# Patient Record
Sex: FEMALE | Race: WHITE | NOT HISPANIC OR LATINO | Employment: UNEMPLOYED | URBAN - NONMETROPOLITAN AREA
[De-identification: names, ages, dates, MRNs, and addresses within clinical notes are randomized per-mention and may not be internally consistent; named-entity substitution may affect disease eponyms.]

---

## 2017-03-01 LAB
EXTERNAL ABO GROUPING: NORMAL
EXTERNAL ANTIBODY SCREEN: NEGATIVE
EXTERNAL CHLAMYDIA SCREEN: NORMAL
EXTERNAL GONORRHEA SCREEN: NORMAL
EXTERNAL HEPATITIS B SURFACE ANTIGEN: NEGATIVE
EXTERNAL RH FACTOR: POSITIVE
EXTERNAL RUBELLA QUALITATIVE: NORMAL
EXTERNAL SYPHILIS RPR SCREEN: NORMAL
HIV1 AB SPEC QL IA.RAPID: NEGATIVE

## 2017-09-13 LAB — EXTERNAL GROUP B STREP ANTIGEN: NORMAL

## 2017-09-27 ENCOUNTER — HOSPITAL ENCOUNTER (OUTPATIENT)
Facility: HOSPITAL | Age: 22
Discharge: HOME | End: 2017-09-27
Attending: OBSTETRICS & GYNECOLOGY | Admitting: OBSTETRICS & GYNECOLOGY

## 2017-09-27 ENCOUNTER — APPOINTMENT (OUTPATIENT)
Dept: ULTRASOUND IMAGING | Facility: HOSPITAL | Age: 22
End: 2017-09-27

## 2017-09-27 VITALS
RESPIRATION RATE: 18 BRPM | WEIGHT: 180 LBS | OXYGEN SATURATION: 99 % | DIASTOLIC BLOOD PRESSURE: 75 MMHG | HEIGHT: 61 IN | BODY MASS INDEX: 33.99 KG/M2 | HEART RATE: 87 BPM | TEMPERATURE: 97.5 F | SYSTOLIC BLOOD PRESSURE: 119 MMHG

## 2017-09-27 PROBLEM — O36.8190 DECREASED FETAL MOVEMENT IN PREGNANCY: Status: ACTIVE | Noted: 2017-09-27

## 2017-09-27 LAB
6-ACETYL MORPHINE: NEGATIVE
AMPHET+METHAMPHET UR QL: NEGATIVE
BARBITURATES UR QL SCN: NEGATIVE
BENZODIAZ UR QL SCN: NEGATIVE
BUPRENORPHINE SERPL-MCNC: NEGATIVE NG/ML
CANNABINOIDS SERPL QL: NEGATIVE
COCAINE UR QL: NEGATIVE
METHADONE UR QL SCN: NEGATIVE
OPIATES UR QL: NEGATIVE
OXYCODONE UR QL SCN: NEGATIVE
PCP UR QL SCN: NEGATIVE

## 2017-09-27 PROCEDURE — 80307 DRUG TEST PRSMV CHEM ANLYZR: CPT | Performed by: OBSTETRICS & GYNECOLOGY

## 2017-09-27 PROCEDURE — 76819 FETAL BIOPHYS PROFIL W/O NST: CPT | Performed by: RADIOLOGY

## 2017-09-27 PROCEDURE — 59025 FETAL NON-STRESS TEST: CPT

## 2017-09-27 PROCEDURE — 76819 FETAL BIOPHYS PROFIL W/O NST: CPT

## 2017-09-27 PROCEDURE — G0463 HOSPITAL OUTPT CLINIC VISIT: HCPCS

## 2017-09-27 RX ORDER — LIDOCAINE HYDROCHLORIDE 10 MG/ML
5 INJECTION, SOLUTION INFILTRATION; PERINEURAL AS NEEDED
Status: DISCONTINUED | OUTPATIENT
Start: 2017-09-27 | End: 2017-09-27 | Stop reason: HOSPADM

## 2017-09-27 RX ORDER — SODIUM CHLORIDE 0.9 % (FLUSH) 0.9 %
1-10 SYRINGE (ML) INJECTION AS NEEDED
Status: DISCONTINUED | OUTPATIENT
Start: 2017-09-27 | End: 2017-09-27 | Stop reason: HOSPADM

## 2017-09-27 RX ORDER — PRENATAL VIT NO.126/IRON/FOLIC 28MG-0.8MG
1 TABLET ORAL NIGHTLY
COMMUNITY
End: 2020-06-01

## 2017-10-01 ENCOUNTER — ANESTHESIA (OUTPATIENT)
Dept: LABOR AND DELIVERY | Facility: HOSPITAL | Age: 22
End: 2017-10-01

## 2017-10-01 ENCOUNTER — ANESTHESIA EVENT (OUTPATIENT)
Dept: LABOR AND DELIVERY | Facility: HOSPITAL | Age: 22
End: 2017-10-01

## 2017-10-01 ENCOUNTER — HOSPITAL ENCOUNTER (INPATIENT)
Facility: HOSPITAL | Age: 22
LOS: 3 days | Discharge: HOME | End: 2017-10-04
Attending: OBSTETRICS & GYNECOLOGY | Admitting: OBSTETRICS & GYNECOLOGY

## 2017-10-01 PROBLEM — Z37.9 NORMAL LABOR: Status: ACTIVE | Noted: 2017-10-01

## 2017-10-01 LAB
6-ACETYL MORPHINE: NEGATIVE
ABO GROUP BLD: NORMAL
AMPHET+METHAMPHET UR QL: NEGATIVE
BARBITURATES UR QL SCN: NEGATIVE
BENZODIAZ UR QL SCN: NEGATIVE
BLD GP AB SCN SERPL QL: NEGATIVE
BUPRENORPHINE SERPL-MCNC: NEGATIVE NG/ML
CANNABINOIDS SERPL QL: NEGATIVE
COCAINE UR QL: NEGATIVE
DEPRECATED RDW RBC AUTO: 43 FL (ref 37–54)
ERYTHROCYTE [DISTWIDTH] IN BLOOD BY AUTOMATED COUNT: 14.9 % (ref 11.5–14.5)
HCT VFR BLD AUTO: 29.6 % (ref 37–47)
HGB BLD-MCNC: 9.4 G/DL (ref 12–16)
MCH RBC QN AUTO: 25.5 PG (ref 27–33)
MCHC RBC AUTO-ENTMCNC: 31.8 G/DL (ref 33–37)
MCV RBC AUTO: 80.2 FL (ref 80–94)
METHADONE UR QL SCN: NEGATIVE
OPIATES UR QL: NEGATIVE
OXYCODONE UR QL SCN: NEGATIVE
PCP UR QL SCN: NEGATIVE
PLATELET # BLD AUTO: 247 10*3/MM3 (ref 130–400)
PMV BLD AUTO: 11.7 FL (ref 6–10)
RBC # BLD AUTO: 3.69 10*6/MM3 (ref 4.2–5.4)
RH BLD: POSITIVE
WBC NRBC COR # BLD: 12.62 10*3/MM3 (ref 4.5–12.5)

## 2017-10-01 PROCEDURE — 80307 DRUG TEST PRSMV CHEM ANLYZR: CPT | Performed by: OBSTETRICS & GYNECOLOGY

## 2017-10-01 PROCEDURE — 25010000002 ROPIVACAINE PER 1 MG: Performed by: ANESTHESIOLOGY

## 2017-10-01 PROCEDURE — 36415 COLL VENOUS BLD VENIPUNCTURE: CPT | Performed by: OBSTETRICS & GYNECOLOGY

## 2017-10-01 PROCEDURE — 86850 RBC ANTIBODY SCREEN: CPT | Performed by: OBSTETRICS & GYNECOLOGY

## 2017-10-01 PROCEDURE — 86900 BLOOD TYPING SEROLOGIC ABO: CPT | Performed by: OBSTETRICS & GYNECOLOGY

## 2017-10-01 PROCEDURE — 85027 COMPLETE CBC AUTOMATED: CPT | Performed by: OBSTETRICS & GYNECOLOGY

## 2017-10-01 PROCEDURE — 86901 BLOOD TYPING SEROLOGIC RH(D): CPT | Performed by: OBSTETRICS & GYNECOLOGY

## 2017-10-01 PROCEDURE — C1755 CATHETER, INTRASPINAL: HCPCS | Performed by: ANESTHESIOLOGY

## 2017-10-01 RX ORDER — SODIUM CHLORIDE 0.9 % (FLUSH) 0.9 %
1-10 SYRINGE (ML) INJECTION AS NEEDED
Status: DISCONTINUED | OUTPATIENT
Start: 2017-10-01 | End: 2017-10-02 | Stop reason: HOSPADM

## 2017-10-01 RX ORDER — ONDANSETRON 4 MG/1
4 TABLET, FILM COATED ORAL EVERY 6 HOURS PRN
Status: DISCONTINUED | OUTPATIENT
Start: 2017-10-01 | End: 2017-10-02 | Stop reason: HOSPADM

## 2017-10-01 RX ORDER — ACETAMINOPHEN 325 MG/1
650 TABLET ORAL EVERY 4 HOURS PRN
Status: DISCONTINUED | OUTPATIENT
Start: 2017-10-01 | End: 2017-10-02 | Stop reason: HOSPADM

## 2017-10-01 RX ORDER — MAGNESIUM HYDROXIDE 1200 MG/15ML
1000 LIQUID ORAL ONCE AS NEEDED
Status: DISCONTINUED | OUTPATIENT
Start: 2017-10-01 | End: 2017-10-02 | Stop reason: HOSPADM

## 2017-10-01 RX ORDER — LIDOCAINE HYDROCHLORIDE 20 MG/ML
INJECTION, SOLUTION EPIDURAL; INFILTRATION; INTRACAUDAL; PERINEURAL
Status: DISCONTINUED
Start: 2017-10-01 | End: 2017-10-04 | Stop reason: HOSPADM

## 2017-10-01 RX ORDER — ONDANSETRON 4 MG/1
4 TABLET, ORALLY DISINTEGRATING ORAL EVERY 6 HOURS PRN
Status: DISCONTINUED | OUTPATIENT
Start: 2017-10-01 | End: 2017-10-02 | Stop reason: HOSPADM

## 2017-10-01 RX ORDER — OXYTOCIN/RINGER'S LACTATE 20/1000 ML
2-30 PLASTIC BAG, INJECTION (ML) INTRAVENOUS
Status: DISCONTINUED | OUTPATIENT
Start: 2017-10-01 | End: 2017-10-02 | Stop reason: HOSPADM

## 2017-10-01 RX ORDER — EPHEDRINE SULFATE 50 MG/ML
10 INJECTION, SOLUTION INTRAVENOUS
Status: DISCONTINUED | OUTPATIENT
Start: 2017-10-01 | End: 2017-10-02 | Stop reason: HOSPADM

## 2017-10-01 RX ORDER — FAMOTIDINE 10 MG/ML
20 INJECTION, SOLUTION INTRAVENOUS ONCE AS NEEDED
Status: DISCONTINUED | OUTPATIENT
Start: 2017-10-01 | End: 2017-10-02 | Stop reason: HOSPADM

## 2017-10-01 RX ORDER — LIDOCAINE HYDROCHLORIDE 20 MG/ML
INJECTION, SOLUTION EPIDURAL; INFILTRATION; INTRACAUDAL; PERINEURAL AS NEEDED
Status: DISCONTINUED | OUTPATIENT
Start: 2017-10-01 | End: 2017-10-06 | Stop reason: SURG

## 2017-10-01 RX ORDER — ROPIVACAINE HYDROCHLORIDE 2 MG/ML
INJECTION, SOLUTION EPIDURAL; INFILTRATION; PERINEURAL AS NEEDED
Status: DISCONTINUED | OUTPATIENT
Start: 2017-10-01 | End: 2017-10-06 | Stop reason: SURG

## 2017-10-01 RX ORDER — PSYLLIUM SEED (WITH DEXTROSE)
POWDER (GRAM) ORAL DAILY
Status: ON HOLD | COMMUNITY
End: 2017-10-01

## 2017-10-01 RX ORDER — ONDANSETRON 2 MG/ML
4 INJECTION INTRAMUSCULAR; INTRAVENOUS EVERY 6 HOURS PRN
Status: DISCONTINUED | OUTPATIENT
Start: 2017-10-01 | End: 2017-10-02 | Stop reason: HOSPADM

## 2017-10-01 RX ORDER — PRENATAL VIT/IRON FUM/FOLIC AC 27MG-0.8MG
1 TABLET ORAL DAILY
Status: DISCONTINUED | OUTPATIENT
Start: 2017-10-02 | End: 2017-10-02

## 2017-10-01 RX ORDER — DOCUSATE SODIUM 100 MG/1
100 CAPSULE, LIQUID FILLED ORAL 2 TIMES DAILY PRN
Status: DISCONTINUED | OUTPATIENT
Start: 2017-10-01 | End: 2017-10-02

## 2017-10-01 RX ORDER — ROPIVACAINE HYDROCHLORIDE 2 MG/ML
INJECTION, SOLUTION EPIDURAL; INFILTRATION; PERINEURAL
Status: DISCONTINUED
Start: 2017-10-01 | End: 2017-10-04 | Stop reason: HOSPADM

## 2017-10-01 RX ORDER — BUTORPHANOL TARTRATE 1 MG/ML
1 INJECTION, SOLUTION INTRAMUSCULAR; INTRAVENOUS
Status: DISCONTINUED | OUTPATIENT
Start: 2017-10-01 | End: 2017-10-02 | Stop reason: HOSPADM

## 2017-10-01 RX ORDER — CALCIUM POLYCARBOPHIL 625 MG 625 MG/1
625 TABLET ORAL DAILY
Status: CANCELLED | OUTPATIENT
Start: 2017-10-02

## 2017-10-01 RX ORDER — ONDANSETRON 2 MG/ML
4 INJECTION INTRAMUSCULAR; INTRAVENOUS ONCE AS NEEDED
Status: DISCONTINUED | OUTPATIENT
Start: 2017-10-01 | End: 2017-10-02 | Stop reason: HOSPADM

## 2017-10-01 RX ORDER — SODIUM CHLORIDE, SODIUM LACTATE, POTASSIUM CHLORIDE, CALCIUM CHLORIDE 600; 310; 30; 20 MG/100ML; MG/100ML; MG/100ML; MG/100ML
INJECTION, SOLUTION INTRAVENOUS
Status: COMPLETED
Start: 2017-10-01 | End: 2017-10-01

## 2017-10-01 RX ORDER — DOCUSATE SODIUM 100 MG/1
100 CAPSULE, LIQUID FILLED ORAL 2 TIMES DAILY PRN
Status: ON HOLD | COMMUNITY
End: 2018-10-10

## 2017-10-01 RX ORDER — SODIUM CHLORIDE, SODIUM LACTATE, POTASSIUM CHLORIDE, CALCIUM CHLORIDE 600; 310; 30; 20 MG/100ML; MG/100ML; MG/100ML; MG/100ML
125 INJECTION, SOLUTION INTRAVENOUS CONTINUOUS
Status: DISCONTINUED | OUTPATIENT
Start: 2017-10-01 | End: 2017-10-02

## 2017-10-01 RX ORDER — ROPIVACAINE HYDROCHLORIDE 2 MG/ML
14 INJECTION, SOLUTION EPIDURAL; INFILTRATION; PERINEURAL CONTINUOUS
Status: DISCONTINUED | OUTPATIENT
Start: 2017-10-01 | End: 2017-10-02

## 2017-10-01 RX ADMIN — ROPIVACAINE HYDROCHLORIDE 14 ML/HR: 2 INJECTION, SOLUTION EPIDURAL; INFILTRATION at 21:03

## 2017-10-01 RX ADMIN — ROPIVACAINE HYDROCHLORIDE 6 ML: 2 INJECTION, SOLUTION EPIDURAL; INFILTRATION at 21:02

## 2017-10-01 RX ADMIN — SODIUM CHLORIDE, POTASSIUM CHLORIDE, SODIUM LACTATE AND CALCIUM CHLORIDE 125 ML/HR: 600; 310; 30; 20 INJECTION, SOLUTION INTRAVENOUS at 21:35

## 2017-10-01 RX ADMIN — LIDOCAINE HYDROCHLORIDE 10 ML: 20 INJECTION, SOLUTION EPIDURAL; INFILTRATION; INTRACAUDAL; PERINEURAL at 21:02

## 2017-10-01 RX ADMIN — AMPICILLIN SODIUM 2 G: 2 INJECTION, POWDER, FOR SOLUTION INTRAMUSCULAR; INTRAVENOUS at 20:00

## 2017-10-01 RX ADMIN — AMPICILLIN SODIUM 1 G: 1 INJECTION, POWDER, FOR SOLUTION INTRAMUSCULAR; INTRAVENOUS at 23:44

## 2017-10-01 RX ADMIN — SODIUM CHLORIDE, POTASSIUM CHLORIDE, SODIUM LACTATE AND CALCIUM CHLORIDE 1000 ML: 600; 310; 30; 20 INJECTION, SOLUTION INTRAVENOUS at 19:48

## 2017-10-01 RX ADMIN — Medication 2 G: at 20:00

## 2017-10-02 PROCEDURE — 25010000002 METHYLERGONOVINE MALEATE PER 0.2 MG

## 2017-10-02 PROCEDURE — 0HQ9XZZ REPAIR PERINEUM SKIN, EXTERNAL APPROACH: ICD-10-PCS | Performed by: OBSTETRICS & GYNECOLOGY

## 2017-10-02 PROCEDURE — 25010000002 BUTORPHANOL PER 1 MG: Performed by: OBSTETRICS & GYNECOLOGY

## 2017-10-02 PROCEDURE — 59025 FETAL NON-STRESS TEST: CPT

## 2017-10-02 PROCEDURE — C1755 CATHETER, INTRASPINAL: HCPCS

## 2017-10-02 RX ORDER — ZOLPIDEM TARTRATE 5 MG/1
5 TABLET ORAL NIGHTLY PRN
Status: DISCONTINUED | OUTPATIENT
Start: 2017-10-02 | End: 2017-10-04 | Stop reason: HOSPADM

## 2017-10-02 RX ORDER — HYDROCODONE BITARTRATE AND ACETAMINOPHEN 5; 325 MG/1; MG/1
1 TABLET ORAL EVERY 4 HOURS PRN
Status: DISCONTINUED | OUTPATIENT
Start: 2017-10-02 | End: 2017-10-04 | Stop reason: HOSPADM

## 2017-10-02 RX ORDER — LANOLIN 100 %
OINTMENT (GRAM) TOPICAL
Status: DISCONTINUED | OUTPATIENT
Start: 2017-10-02 | End: 2017-10-04 | Stop reason: HOSPADM

## 2017-10-02 RX ORDER — SODIUM CHLORIDE 0.9 % (FLUSH) 0.9 %
1-10 SYRINGE (ML) INJECTION AS NEEDED
Status: DISCONTINUED | OUTPATIENT
Start: 2017-10-02 | End: 2017-10-04 | Stop reason: HOSPADM

## 2017-10-02 RX ORDER — MISOPROSTOL 100 UG/1
800 TABLET ORAL AS NEEDED
Status: DISCONTINUED | OUTPATIENT
Start: 2017-10-02 | End: 2017-10-02 | Stop reason: HOSPADM

## 2017-10-02 RX ORDER — ACETAMINOPHEN 325 MG/1
650 TABLET ORAL EVERY 4 HOURS PRN
Status: DISCONTINUED | OUTPATIENT
Start: 2017-10-02 | End: 2017-10-04 | Stop reason: HOSPADM

## 2017-10-02 RX ORDER — ONDANSETRON 4 MG/1
4 TABLET, FILM COATED ORAL EVERY 6 HOURS PRN
Status: DISCONTINUED | OUTPATIENT
Start: 2017-10-02 | End: 2017-10-04 | Stop reason: HOSPADM

## 2017-10-02 RX ORDER — OXYTOCIN/RINGER'S LACTATE 20/1000 ML
2 PLASTIC BAG, INJECTION (ML) INTRAVENOUS CONTINUOUS
Status: DISCONTINUED | OUTPATIENT
Start: 2017-10-02 | End: 2017-10-02

## 2017-10-02 RX ORDER — METHYLERGONOVINE MALEATE 0.2 MG/ML
200 INJECTION INTRAVENOUS ONCE AS NEEDED
Status: COMPLETED | OUTPATIENT
Start: 2017-10-02 | End: 2017-10-02

## 2017-10-02 RX ORDER — ONDANSETRON 2 MG/ML
4 INJECTION INTRAMUSCULAR; INTRAVENOUS EVERY 6 HOURS PRN
Status: DISCONTINUED | OUTPATIENT
Start: 2017-10-02 | End: 2017-10-04 | Stop reason: HOSPADM

## 2017-10-02 RX ORDER — IBUPROFEN 800 MG/1
800 TABLET ORAL EVERY 8 HOURS SCHEDULED
Status: DISCONTINUED | OUTPATIENT
Start: 2017-10-02 | End: 2017-10-04 | Stop reason: HOSPADM

## 2017-10-02 RX ORDER — SODIUM CHLORIDE, SODIUM LACTATE, POTASSIUM CHLORIDE, CALCIUM CHLORIDE 600; 310; 30; 20 MG/100ML; MG/100ML; MG/100ML; MG/100ML
125 INJECTION, SOLUTION INTRAVENOUS CONTINUOUS
Status: DISCONTINUED | OUTPATIENT
Start: 2017-10-02 | End: 2017-10-02

## 2017-10-02 RX ORDER — DOCUSATE SODIUM 100 MG/1
100 CAPSULE, LIQUID FILLED ORAL 2 TIMES DAILY
Status: DISCONTINUED | OUTPATIENT
Start: 2017-10-02 | End: 2017-10-03

## 2017-10-02 RX ORDER — CARBOPROST TROMETHAMINE 250 UG/ML
250 INJECTION, SOLUTION INTRAMUSCULAR AS NEEDED
Status: DISCONTINUED | OUTPATIENT
Start: 2017-10-02 | End: 2017-10-02 | Stop reason: HOSPADM

## 2017-10-02 RX ORDER — METHYLERGONOVINE MALEATE 0.2 MG/ML
INJECTION INTRAVENOUS
Status: COMPLETED
Start: 2017-10-02 | End: 2017-10-02

## 2017-10-02 RX ORDER — ONDANSETRON 4 MG/1
4 TABLET, ORALLY DISINTEGRATING ORAL EVERY 6 HOURS PRN
Status: DISCONTINUED | OUTPATIENT
Start: 2017-10-02 | End: 2017-10-04 | Stop reason: HOSPADM

## 2017-10-02 RX ADMIN — WITCH HAZEL 1 PAD: 500 SOLUTION RECTAL; TOPICAL at 06:10

## 2017-10-02 RX ADMIN — DOCUSATE SODIUM 100 MG: 100 CAPSULE, LIQUID FILLED ORAL at 09:21

## 2017-10-02 RX ADMIN — OXYTOCIN 333 MILLI-UNITS/MIN: 10 INJECTION INTRAVENOUS at 02:26

## 2017-10-02 RX ADMIN — IBUPROFEN 800 MG: 800 TABLET ORAL at 13:56

## 2017-10-02 RX ADMIN — IBUPROFEN 800 MG: 800 TABLET ORAL at 06:10

## 2017-10-02 RX ADMIN — DOCUSATE SODIUM 100 MG: 100 CAPSULE, LIQUID FILLED ORAL at 17:17

## 2017-10-02 RX ADMIN — ACETAMINOPHEN 650 MG: 325 TABLET ORAL at 22:31

## 2017-10-02 RX ADMIN — METHYLERGONOVINE MALEATE 200 MCG: 0.2 INJECTION INTRAVENOUS at 02:26

## 2017-10-02 RX ADMIN — METHYLERGONOVINE MALEATE 200 MCG: 0.2 INJECTION, SOLUTION INTRAMUSCULAR; INTRAVENOUS at 02:26

## 2017-10-02 RX ADMIN — BUTORPHANOL TARTRATE 2 MG: 2 INJECTION, SOLUTION INTRAMUSCULAR; INTRAVENOUS at 02:52

## 2017-10-02 RX ADMIN — ACETAMINOPHEN 650 MG: 325 TABLET ORAL at 04:11

## 2017-10-02 RX ADMIN — ACETAMINOPHEN 650 MG: 325 TABLET ORAL at 11:58

## 2017-10-02 RX ADMIN — BENZOCAINE AND MENTHOL: 20; .5 SPRAY TOPICAL at 06:10

## 2017-10-02 RX ADMIN — PRAMOXINE HYDROCHLORIDE AND HYDROCORTISONE ACETATE: 100; 100 AEROSOL, FOAM TOPICAL at 06:10

## 2017-10-02 RX ADMIN — IBUPROFEN 800 MG: 800 TABLET ORAL at 21:26

## 2017-10-02 NOTE — ANESTHESIA PROCEDURE NOTES
Labor Epidural    Patient location during procedure: OB  Start Time: 10/1/2017 9:01 PM  Stop Time: 10/1/2017 9:02 PM  Indication:at surgeon's request  Performed By  Anesthesiologist: RICCO LATIF  Preanesthetic Checklist  Completed: patient identified, site marked, surgical consent, pre-op evaluation, timeout performed, IV checked, risks and benefits discussed and monitors and equipment checked  Prep:  Pt Position:sitting  Sterile Tech:gloves, mask, sterile barrier and cap  Prep:povidone-iodine 7.5% surgical scrub  Monitoring:blood pressure monitoring  Epidural Block Procedure:  Approach:midline  Guidance:landmark technique and palpation technique  Location:L3-L4  Needle Type:Tuohy  Needle Gauge:17 G  Loss of Resistance Medium: saline  Loss of Resistance: 6cm  Cath Depth at skin:13 cm  Paresthesia: none  Aspiration:negative  Test Dose:negative  Number of Attempts: 1  Post Assessment:  Dressing:occlusive dressing applied and secured with tape  Pt Tolerance:patient tolerated the procedure well with no apparent complications  Complications:no

## 2017-10-02 NOTE — PROGRESS NOTES
Case Management/Social Work    Patient Name:  Yvonne Barnett  YOB: 1995  MRN: [id number]  Admit Date:  10/1/2017      SS received consult for positive UDS for THC during pregnancy.  Pt is a 23 y/o who delivered a viable baby girl weighing 7lbs and 3.3 ozs.  Infant named Natalee Valdez.  FOB is Connor Casas and is involved.      SS contacted [facility] and spoke with Katia and she states the pt tested positive on 03/27/17 and 04/26/17 for THC.  Pt reports she used THC due to nausea.  Pt's UDS is negative and infants is negative with meconium pending.  Pt denies any other history of substance abuse.     Pt does not have other children. Pt denies a history with child protective services.   SS contacted [facility] and provided referral.  Intake ID# [id number].  Report was not accepted at this time.  However, SS will review infants UDS and Cord Stat when available.      Pt utilizes [place] Medicaid, WIC and SNAP.  Pt reports she has infant care supplies and car seat available.      Pt will return home with the FOB, Connor at discharge. Pt will be transported home by her father, Diego Barnett.     Pt's UDS is negative and infants is negative with meconium pending. Nursery to contact SS if Cord Stat is positive, infant begins showing signs of withdrawal or other concerns arise.      Infant can be released with the pt at discharge.     SS will continue to follow.          Electronically signed by:  Ina Epperson  10/02/17 12:57 PM

## 2017-10-02 NOTE — PLAN OF CARE
Problem: Patient Care Overview (Adult)  Goal: Plan of Care Review  Outcome: Ongoing (interventions implemented as appropriate)    10/02/17 0543   Coping/Psychosocial Response Interventions   Plan Of Care Reviewed With patient   Patient Care Overview   Progress improving   Outcome Evaluation   Outcome Summary/Follow up Plan will dc home stable and able to care for self and infant       Goal: Adult Individualization and Mutuality  Outcome: Ongoing (interventions implemented as appropriate)  Goal: Discharge Needs Assessment  Outcome: Ongoing (interventions implemented as appropriate)    Problem: Postpartum, Vaginal Delivery (Adult)  Goal: Signs and Symptoms of Listed Potential Problems Will be Absent or Manageable (Postpartum, Vaginal Delivery)  Outcome: Ongoing (interventions implemented as appropriate)

## 2017-10-02 NOTE — L&D DELIVERY NOTE
Vaginal Delivery Procedure Note    Yvonne Barnett  Gestational Age-39.2 wks        OBGYN: Esthela Caicedo DO      Pre-op Diagnosis:   21 y/o  @ 39.2 wks in active labor      Anesthesia: Epidural        Detailed Description of Procedure     The patient was prepped and draped in normal sterile fashion. The head was delivered without difficulty. There was no nuchal cord. Anterior and posterior shoulders delivered without any problems. The rest of the infant was delivered in controlled fashion.The infant was bulb suctioned at delivery. The placenta delivered intact. The patient tolerated the procedure well.       Time of delivery: 222  Maternal Blood Type: A Positive  Fetal Gender: Female  Nuchal Cord: No  Tears: 1st deg midline laceration   Blood Cord: Yes  Estimated Blood Loss: 200cc  Placenta: Spontaneous, Delivered Intact   APGARS:  8  9          Disposition: Transfer to Women's Health Floor  Condition: Stable    Esthela Caicedo DO     Date: 10/2/2017  Time: 2:37 AM

## 2017-10-02 NOTE — PLAN OF CARE
Problem: Labor (Cervical Ripen, Induct, Augment) (Adult,Obstetrics,Pediatric)  Goal: Signs and Symptoms of Listed Potential Problems Will be Absent or Manageable (Labor)  Outcome: Ongoing (interventions implemented as appropriate)    10/01/17 2100   Labor (Cervical Ripen, Induct, Augment)   Problems Assessed (Labor) all   Problems Present (Labor) pain

## 2017-10-02 NOTE — NON STRESS TEST
Yvonne Barnett, a  at 39w2d with an AAKASH of 10/7/2017, by Patient Reported, was seen at [facility] for a nonstress test.    Chief Complaint   Patient presents with   • Abdominal Pain     low abd and back pain, mucousy discharge, states baby has moved well today but slightly less than usual                Reason for test:  labor  Date of Test: 10/1/2017  Time frame of test: 20 mins  RN NST Interpretation:  Reactive NST noted and verified per COLUMBA Porras RN.  Farzaneh Fiore, ALBERTO  10/1/2017 2030

## 2017-10-02 NOTE — PLAN OF CARE
Problem: Labor (Cervical Ripen, Induct, Augment) (Adult,Obstetrics,Pediatric)  Goal: Signs and Symptoms of Listed Potential Problems Will be Absent or Manageable (Labor)  Outcome: Outcome(s) achieved Date Met:  10/02/17    10/02/17 0222   Labor (Cervical Ripen, Induct, Augment)   Problems Assessed (Labor) all   Problems Present (Labor) none

## 2017-10-02 NOTE — H&P
SHERRI Holland  Obstetric History and Physical    Chief Complaint   Patient presents with   • Abdominal Pain     low abd and back pain, mucousy discharge, states baby has moved well today but slightly less than usual       Subjective     Patient is a 22 y.o. female  currently at 39w2d, who presents with labor.    Her prenatal care is benign.  Her previous obstetric/gynecological history is noted for is non-contributory.    The following portions of the patients history were reviewed and updated as appropriate: current medications, allergies, past medical history, past surgical history, past family history, past social history and problem list .   Social History     Social History   • Marital status: Single     Spouse name: N/A   • Number of children: N/A   • Years of education: N/A     Occupational History   • Not on file.     Social History Main Topics   • Smoking status: Former Smoker   • Smokeless tobacco: Never Used   • Alcohol use No   • Drug use: Yes     Special: Marijuana      Comment: PER PRENATAL   • Sexual activity: Defer     Other Topics Concern   • Not on file     Social History Narrative     Past Medical History:   Diagnosis Date   • Migraine     no meds prescribed   • Urinary tract infection        Current Facility-Administered Medications:   •  acetaminophen (TYLENOL) tablet 650 mg, 650 mg, Oral, Q4H PRN, Esthela Caicedo DO  •  [COMPLETED] ampicillin 2 g/100 mL 0.9% NS (MBP), 2 g, Intravenous, Once, 2 g at 10/01/17 2000 **FOLLOWED BY** ampicillin 1 g/100 mL 0.9% NS (MBP), 1 g, Intravenous, Q4H, Esthela Caicedo DO, 1 g at 10/01/17 5214  •  butorphanol (STADOL) injection 1 mg, 1 mg, Intravenous, Q3H PRN, Esthela Caicedo DO  •  butorphanol (STADOL) injection 2 mg, 2 mg, Intravenous, Q3H PRN, Esthela Caicedo DO  •  docusate sodium (COLACE) capsule 100 mg, 100 mg, Oral, BID PRN, Darryn Hernandez MD  •  ePHEDrine injection 10 mg, 10 mg, Intravenous, Q5 Min PRN, Lewis  MD [place]  •  famotidine (PEPCID) injection 20 mg, 20 mg, Intravenous, Once PRN, Lewis Storey MD  •  influenza vac split quad (FLUZONE,FLUARIX,AFLURIA) injection 0.5 mL, 0.5 mL, Intramuscular, During Hospitalization, Esthela Caicedo DO  •  lactated ringers infusion, 125 mL/hr, Intravenous, Continuous, Esthela Caicedo DO, Last Rate: 125 mL/hr at 10/01/17 2135, 125 mL/hr at 10/01/17 2135  •  lidocaine PF 2% (XYLOCAINE) 2 % injection  - ADS Override Pull, , , ,   •  methylergonovine (METHERGINE) 0.2 MG/ML injection  - ADS Override Pull, , , ,   •  ondansetron (ZOFRAN) tablet 4 mg, 4 mg, Oral, Q6H PRN **OR** ondansetron ODT (ZOFRAN-ODT) disintegrating tablet 4 mg, 4 mg, Oral, Q6H PRN **OR** ondansetron (ZOFRAN) injection 4 mg, 4 mg, Intravenous, Q6H PRN, Esthela Caicedo DO  •  ondansetron (ZOFRAN) injection 4 mg, 4 mg, Intravenous, Once PRN, Lewis Storey MD  •  oxytocin (PITOCIN) in lactated Ringer's 1000 mL infusion solution, 2-30 cesar-units/min, Intravenous, Titrated, Esthela Caicedo DO, Stopped at 10/01/17 2348  •  prenatal vitamin 27-0.8 tablet 1 tablet, 1 tablet, Oral, Daily, Darryn Hernandez MD  •  psyllium (KONSYL) pack 1 packet, 1 packet, Oral, Daily, Esthela Caicedo DO  •  ropivacaine (NAROPIN) 0.2 % injection  - ADS Override Pull, , , ,   •  ropivacaine (NAROPIN) 0.2 % injection, 14 mL/hr, Epidural, Continuous, Lewis Storey MD, Last Rate: 14 mL/hr at 10/01/17 2103, 14 mL/hr at 10/01/17 2103  •  sodium chloride (NS) irrigation solution 1,000 mL, 1,000 mL, Irrigation, Once PRN, Esthela Rosita Caicedo, DO  •  sodium chloride 0.9 % flush 1-10 mL, 1-10 mL, Intravenous, PRN, Esthela Caicedo, DO    Facility-Administered Medications Ordered in Other Encounters:   •  lidocaine PF 2% (XYLOCAINE) injection, , Epidural, PRN, Lewis Storey MD, 10 mL at 10/01/17 2102  •  ropivacaine (NAROPIN) 0.2 % injection, , Epidural, PRN, Lewis Storey MD, 6 mL at 10/01/17  2102  No Known Allergies  Past Surgical History:   Procedure Laterality Date   • WISDOM TOOTH EXTRACTION      one was removed         Prenatal Information:   Maternal Prenatal Labs  Blood Type ABO Type   Date Value Ref Range Status   10/01/2017 A  Final      Rh Status RH type   Date Value Ref Range Status   10/01/2017 Positive  Final      Antibody Screen Antibody Screen   Date Value Ref Range Status   10/01/2017 Negative  Final      Rapid Urin Drug Screen Barbiturates Screen, Urine   Date Value Ref Range Status   10/01/2017 Negative Negative Final     Benzodiazepine Screen, Urine   Date Value Ref Range Status   10/01/2017 Negative Negative Final     Methadone Screen, Urine   Date Value Ref Range Status   10/01/2017 Negative Negative Final     Opiate Screen   Date Value Ref Range Status   10/01/2017 Negative Negative Final     THC, Screen, Urine   Date Value Ref Range Status   10/01/2017 Negative Negative Final     Cocaine Screen, Urine   Date Value Ref Range Status   10/01/2017 Negative Negative Final     Amphetamine Screen, Urine   Date Value Ref Range Status   10/01/2017 Negative Negative Final     Buprenorphine, Screen, Urine   Date Value Ref Range Status   10/01/2017 Negative Negative Final     Oxycodone Screen, Urine   Date Value Ref Range Status   10/01/2017 Negative Negative Final      Group B Strep Culture No results found for: GBSANTIGEN           External Prenatal Results         Pregnancy Outside Results - these were transcribed from office records.  See scanned records for details. Date Time   Hgb      Hct      ABO ^ A  03/01/17    Rh ^ Positive  03/01/17    Antibody Screen ^ Negative  03/01/17    Glucose Fasting GTT      Glucose Tolerance Test 1 hour      Glucose Tolerance Test 3 hour      Gonorrhea (discrete) ^ NEG  03/01/17    Chlamydia (discrete) ^ NEG  03/01/17    RPR ^ Non-Reactive  03/01/17    VDRL      Syphillis Antibody      Rubella ^ Immune  03/01/17    HBsAg ^ Negative  03/01/17    Herpes  Simplex Virus PCR      Herpes Simplex VIrus Culture      HIV ^ Negative  (A) 17    Hep C RNA Quant PCR      Hep C Antibody      Urine Drug Screen      AFP      Group B Strep ^ POS  17    GBS Susceptibility to Clindamycin      GBS Susceptibility to Eythromycin      Fetal Fibronectin      Genetic Testing, Maternal Blood             Legend: ^: Historical            Past OB History:     Obstetric History       T0      L0     SAB0   TAB0   Ectopic0   Multiple0   Live Births0       # Outcome Date GA Lbr Victor M/2nd Weight Sex Delivery Anes PTL Lv   2 Current            1 SAB                   Past Medical History: Past Medical History:   Diagnosis Date   • Migraine     no meds prescribed   • Urinary tract infection       Past Surgical History Past Surgical History:   Procedure Laterality Date   • WISDOM TOOTH EXTRACTION      one was removed      Family History: Family History   Problem Relation Age of Onset   • Diabetes Paternal Grandfather    • Hypertension Paternal Grandfather    • Hypertension Paternal Grandmother    • Hypertension Maternal Grandmother    • Hypertension Maternal Grandfather       Social History:  reports that she has quit smoking. She has never used smokeless tobacco.   reports that she does not drink alcohol.   reports that she uses illicit drugs, including Marijuana.        Review of Systems      Objective     Vital Signs Range for the last 24 hours  Temperature: Temp:  [96.8 °F (36 °C)-98.1 °F (36.7 °C)] 98.1 °F (36.7 °C)   Temp Source: Temp src: Oral   BP: BP: (119-150)/(66-96) 138/82   Pulse: Heart Rate:  [] 100   Respirations: Resp:  [20] 20   Weight: Weight:  [82.1 kg (181 lb)] 82.1 kg (181 lb)     Physical Examination: General appearance - alert, well appearing, and in no distress, oriented to person, place, and time, normal appearing weight and well hydrated  Mental status - alert, oriented to person, place, and time, normal mood, behavior, speech, dress, motor  activity, and thought processes, affect appropriate to mood  Neck - supple, no significant adenopathy  Chest - clear to auscultation, no wheezes, rales or rhonchi, symmetric air entry, no tachypnea, retractions or cyanosis  Heart - normal rate, regular rhythm, normal S1, S2, no murmurs, rubs, clicks or gallops  Abdomen - soft, nontender, gravid uterus, no masses or organomegaly  no rebound tenderness noted,   Pelvic - normal external genitalia, vulva, vagina, cervix, uterus and adnexa  Neurological - alert, oriented, normal speech, no focal findings or movement disorder noted  Musculoskeletal - no joint tenderness, deformity or swelling  Extremities - peripheral pulses normal, no pedal edema, no clubbing or cyanosis  Skin - normal coloration and turgor, no rashes, no suspicious skin lesions noted        Cervix: Exam by: Method: sterile exam per physician   Dilation: Dilation: 10   Effacement: Cervical Effacement: 100%   Station: Station: 1     Laboratory Results:   Lab Results (last 24 hours)     Procedure Component Value Units Date/Time    CBC (No Diff) [[id number]]  (Abnormal) Collected:  10/01/17 1955    Specimen:  Blood Updated:  10/01/17 2010     WBC 12.62 (H) 10*3/mm3      RBC 3.69 (L) 10*6/mm3      Hemoglobin 9.4 (L) g/dL      Hematocrit 29.6 (L) %      MCV 80.2 fL      MCH 25.5 (L) pg      MCHC 31.8 (L) g/dL      RDW 14.9 (H) %      RDW-SD 43.0 fl      MPV 11.7 (H) fL      Platelets 247 10*3/mm3     Urine Drug Screen - Urine, Clean Catch [[id number]]  (Normal) Collected:  10/01/17 2039    Specimen:  Urine from Urine, Clean Catch Updated:  10/01/17 2117     Amphetamine Screen, Urine Negative     Barbiturates Screen, Urine Negative     Benzodiazepine Screen, Urine Negative     Cocaine Screen, Urine Negative     Methadone Screen, Urine Negative     Opiate Screen Negative     Phencyclidine (PCP), Urine Negative     THC, Screen, Urine Negative     6-ACETYL MORPHINE Negative     Buprenorphine, Screen, Urine Negative      Oxycodone Screen, Urine Negative    Narrative:       Negative Thresholds For Drugs Screened:                  Amphetamines              1000 ng/ml               Barbiturates               200 ng/ml               Benzodiazepines            200 ng/ml              Cocaine                    300 ng/ml              Methadone                  300 ng/ml              Opiates                    300 ng/ml               Phencyclidine               25 ng/ml               THC                         50 ng/ml              6-Acetyl Morphine           10 ng/ml              Buprenorphine                5 ng/ml              Oxycodone                  300 ng/ml    The reference range for all drugs tested is negative. This report includes final unconfirmed qualitative results to be used for medical treatment purposes only. Unconfirmed results must not be used for non-medical purposes such as employment or legal testing. Clinical consideration should be applied to any drug of abuse test, especially when unconfirmed quantitative results are used.          Radiology Review:   Imaging Results (last 72 hours)     ** No results found for the last 72 hours. **            Assessment/Plan     Active Problems:    Normal labor      Assessment & Plan    Assessment:  1.  Intrauterine pregnancy at 39w2d weeks gestation with reassuring fetal status.    2.  labor  without ROM  3.  Obstetrical history significant for is non-contributory.  4.  GBS status: No results found for: GBSANTIGEN    Plan:  1. fetal and uterine monitoring  continuously and expectant management  2. Plan of care has been reviewed with patient   3.  Risks, benefits of treatment plan have been discussed.  4.  All questions have been answered.        Esthela Caicedo DO  10/2/2017  2:36 AM

## 2017-10-02 NOTE — PLAN OF CARE
Problem: Postpartum, Vaginal Delivery (Adult)  Intervention: Support Life/Role Transition    10/02/17 1114   Coping/Psychosocial Interventions   Parent/Child Attachment Promotion attachment promoted   Environmental Support calm environment promoted   Coping Strategies   Trust Relationship/Rapport care explained   Family/Support System Care self-care encouraged         Goal: Signs and Symptoms of Listed Potential Problems Will be Absent or Manageable (Postpartum, Vaginal Delivery)  Outcome: Ongoing (interventions implemented as appropriate)    10/02/17 1114   Postpartum, Vaginal Delivery   Problems Assessed (Postpartum Vaginal Delivery) all   Problems Present (Postpartum Vaginal Delivery) none

## 2017-10-02 NOTE — PROGRESS NOTES
" [facility]  Vaginal Delivery Progress Note    Subjective   Subjective  Postpartum Day 0: Vaginal Delivery    The patient feels tired.  Her pain is well controlled with nonsteroidal anti-inflammatory drugs and opioid analgesics.   She is ambulating well.  Patient describes her bleeding as moderate lochia.    Breastfeeding: declines.    Objective     Objective   Vital Signs Range for the last 24 hours  Temperature: Temp:  [96.8 °F (36 °C)-98.6 °F (37 °C)] 98.5 °F (36.9 °C)   Temp Source: Temp src: Oral   BP: BP: (108-150)/(58-96) 115/75   Pulse: Heart Rate:  [] 66   Respirations: Resp:  [18-20] 20   Weight: Weight:  [181 lb (82.1 kg)] 181 lb (82.1 kg)     Admit Height:  Height: 61\" (154.9 cm)    Physical Exam:  General:  no acute distresss.  Abdomen: Fundus: appropriate, firm, non tender  Extremities: normal, atraumatic, no cyanosis, and trace edema.       [unfilled]       Lab Results   Component Value Date    ABO A 10/01/2017    RH Positive 10/01/2017        Lab Results   Component Value Date    HGB 9.4 (L) 10/01/2017    HCT 29.6 (L) 10/01/2017         Assessment/Plan   Assessment & Plan  Active Problems:    Normal labor      Yvonne Barnett is Day 0  post-partum  Vaginal, Spontaneous Delivery  terminal meconium .      Plan:  Continue current care.      CRIS Cordero  10/2/2017  8:39 AM    "

## 2017-10-02 NOTE — PLAN OF CARE
Problem: Patient Care Overview (Adult)  Goal: Plan of Care Review  Outcome: Ongoing (interventions implemented as appropriate)    10/01/17 2030   Coping/Psychosocial Response Interventions   Plan Of Care Reviewed With patient;significant other   Patient Care Overview   Progress progress toward functional goals as expected       Goal: Adult Individualization and Mutuality  Outcome: Ongoing (interventions implemented as appropriate)    10/01/17 2030   Individualization   Patient Specific Preferences Epidural, bottlefeeding, and HARLAN   Patient Specific Goals Delivery of healthy baby   Patient Specific Interventions EFM, IV antibiotics for GBS+   Mutuality/Individual Preferences   What Anxieties, Fears or Concerns Do You Have About Your Health or Care? general anxiety about having baby, labor and pushing   What Information Would Help Us Give You More Personalized Care? Pt's mom not here, FOB's mother states pt's mother not a part of her life       Goal: Discharge Needs Assessment  Outcome: Ongoing (interventions implemented as appropriate)    10/01/17 2100   Discharge Needs Assessment   Concerns To Be Addressed denies needs/concerns at this time   Readmission Within The Last 30 Days no previous admission in last 30 days   Equipment Needed After Discharge none   Discharge Disposition home or self-care   Discharge Planning Comments expects discharged when able to care for herself and infant   Current Health   Anticipated Changes Related to Illness none   Self-Care   Equipment Currently Used at Home none   Living Environment   Transportation Available car;family or friend will provide

## 2017-10-03 LAB
BASOPHILS # BLD AUTO: 0.02 10*3/MM3 (ref 0–0.3)
BASOPHILS NFR BLD AUTO: 0.2 % (ref 0–2)
DEPRECATED RDW RBC AUTO: 43.5 FL (ref 37–54)
EOSINOPHIL # BLD AUTO: 0.05 10*3/MM3 (ref 0–0.7)
EOSINOPHIL NFR BLD AUTO: 0.4 % (ref 0–5)
ERYTHROCYTE [DISTWIDTH] IN BLOOD BY AUTOMATED COUNT: 15.3 % (ref 11.5–14.5)
HCT VFR BLD AUTO: 26.4 % (ref 37–47)
HGB BLD-MCNC: 8 G/DL (ref 12–16)
IMM GRANULOCYTES # BLD: 0.04 10*3/MM3 (ref 0–0.03)
IMM GRANULOCYTES NFR BLD: 0.3 % (ref 0–0.5)
LYMPHOCYTES # BLD AUTO: 2.95 10*3/MM3 (ref 1–3)
LYMPHOCYTES NFR BLD AUTO: 22.4 % (ref 21–51)
MCH RBC QN AUTO: 24.8 PG (ref 27–33)
MCHC RBC AUTO-ENTMCNC: 30.3 G/DL (ref 33–37)
MCV RBC AUTO: 82 FL (ref 80–94)
MONOCYTES # BLD AUTO: 0.87 10*3/MM3 (ref 0.1–0.9)
MONOCYTES NFR BLD AUTO: 6.6 % (ref 0–10)
NEUTROPHILS # BLD AUTO: 9.23 10*3/MM3 (ref 1.4–6.5)
NEUTROPHILS NFR BLD AUTO: 70.1 % (ref 30–70)
PLATELET # BLD AUTO: 216 10*3/MM3 (ref 130–400)
PMV BLD AUTO: 11.5 FL (ref 6–10)
RBC # BLD AUTO: 3.22 10*6/MM3 (ref 4.2–5.4)
WBC NRBC COR # BLD: 13.16 10*3/MM3 (ref 4.5–12.5)

## 2017-10-03 PROCEDURE — 63710000001 DIPHENHYDRAMINE PER 50 MG: Performed by: OBSTETRICS & GYNECOLOGY

## 2017-10-03 PROCEDURE — 85025 COMPLETE CBC W/AUTO DIFF WBC: CPT | Performed by: OBSTETRICS & GYNECOLOGY

## 2017-10-03 RX ORDER — FERROUS SULFATE 325(65) MG
325 TABLET ORAL 2 TIMES DAILY WITH MEALS
Status: DISCONTINUED | OUTPATIENT
Start: 2017-10-03 | End: 2017-10-04 | Stop reason: HOSPADM

## 2017-10-03 RX ORDER — DOCUSATE SODIUM 100 MG/1
100 CAPSULE, LIQUID FILLED ORAL DAILY
Status: DISCONTINUED | OUTPATIENT
Start: 2017-10-04 | End: 2017-10-04 | Stop reason: HOSPADM

## 2017-10-03 RX ORDER — DIPHENHYDRAMINE HCL 25 MG
25 CAPSULE ORAL NIGHTLY PRN
Status: DISCONTINUED | OUTPATIENT
Start: 2017-10-03 | End: 2017-10-04 | Stop reason: HOSPADM

## 2017-10-03 RX ADMIN — DIPHENHYDRAMINE HYDROCHLORIDE 25 MG: 25 CAPSULE ORAL at 21:38

## 2017-10-03 RX ADMIN — DOCUSATE SODIUM 100 MG: 100 CAPSULE, LIQUID FILLED ORAL at 08:01

## 2017-10-03 RX ADMIN — PRAMOXINE HYDROCHLORIDE AND HYDROCORTISONE ACETATE: 100; 100 AEROSOL, FOAM TOPICAL at 00:27

## 2017-10-03 RX ADMIN — FERROUS SULFATE TAB 325 MG (65 MG ELEMENTAL FE) 325 MG: 325 (65 FE) TAB at 10:47

## 2017-10-03 RX ADMIN — FERROUS SULFATE TAB 325 MG (65 MG ELEMENTAL FE) 325 MG: 325 (65 FE) TAB at 17:28

## 2017-10-03 RX ADMIN — IBUPROFEN 800 MG: 800 TABLET ORAL at 13:37

## 2017-10-03 RX ADMIN — ACETAMINOPHEN 650 MG: 325 TABLET ORAL at 10:52

## 2017-10-03 RX ADMIN — DIPHENHYDRAMINE HYDROCHLORIDE 25 MG: 25 CAPSULE ORAL at 00:25

## 2017-10-03 RX ADMIN — IBUPROFEN 800 MG: 800 TABLET ORAL at 06:12

## 2017-10-03 RX ADMIN — IBUPROFEN 800 MG: 800 TABLET ORAL at 23:08

## 2017-10-03 RX ADMIN — ACETAMINOPHEN 650 MG: 325 TABLET ORAL at 21:38

## 2017-10-03 RX ADMIN — HYDROCODONE BITARTRATE AND ACETAMINOPHEN 1 TABLET: 5; 325 TABLET ORAL at 23:08

## 2017-10-03 RX ADMIN — ACETAMINOPHEN 650 MG: 325 TABLET ORAL at 17:31

## 2017-10-03 NOTE — PAYOR COMM NOTE
"CONTACT:  CEFERINO ASHLEY RN, BSN  UTILIZATION MANAGEMENT DEPT.  [facility]  [address] [facility], [address]  PHONE:  [phone]  FAX: [phone]    REQUEST FOR INPATIENT DELIVERY AUTHORIZATION.    ICD 10 CODE: O80  DR. LEANA CAICEDO NPI: [id number]  [facility] NPI: [id number]    PT ADMITTED 10/1/17, DELIVERED VAGINALLY 10/2/17, WELL BABY GIRL, REGULAR NURSERY, WEIGHT 3269 GRAMS, APGARS 8/9, GA 39/1, , EDC 10/7/17, BABY'S NAME: GRANT JACOB Lisa Baker (22 y.o. Female)     Date of Birth Social Security Number Address Home Phone MRN    [id number]  [id number] [place] [id number] [phone] [id number]    Mandaeism Marital Status          None Single       Admission Date Admission Type Admitting Provider Attending Provider Department, Room/Bed    10/1/17 Elective Leana Caicedo, Leana Macario DO Psychiatric, W246/    Discharge Date Discharge Disposition Discharge Destination                      Attending Provider: Leana Caicedo DO     Allergies:  No Known Allergies    Isolation:  None   Infection:  None   Code Status:  FULL    Ht:  61\" (154.9 cm)   Wt:  181 lb (82.1 kg)    Admission Cmt:  None   Principal Problem:  None                Active Insurance as of 10/1/2017     Primary Coverage     Payor Plan Insurance Group Employer/Plan Group    ANTH MEDICAID [facility] MEDICAID [id number]     Payor Plan Address Payor Plan Phone Number Effective From Effective To    [address] [address] [phone] 2015     [place] [address]       Subscriber Name Subscriber Birth Date Member ID       LISA BARNETT 1995 [id number]                 Emergency Contacts      (Rel.) Home Phone Work Phone Mobile Phone    Diego Barnett (Other) [phone] -- --               History & Physical      H&P filed by Maggi Burgess MD at 10/2/2017  2:32 PM      Scan on " 10/1/2017 : [facility] 10/01/2017 (below)              Electronically signed by Interface, Scans Incoming at 10/2/2017  2:32 PM      Esthela Caicedo DO at 10/2/2017  2:36 AM          SHERRI Holland  Obstetric History and Physical    Chief Complaint   Patient presents with   • Abdominal Pain     low abd and back pain, mucousy discharge, states baby has moved well today but slightly less than usual       Subjective     Patient is a 22 y.o. female  currently at 39w2d, who presents with labor.    Her prenatal care is benign.  Her previous obstetric/gynecological history is noted for is non-contributory.    The following portions of the patients history were reviewed and updated as appropriate: current medications, allergies, past medical history, past surgical history, past family history, past social history and problem list .   Social History     Social History   • Marital status: Single     Spouse name: N/A   • Number of children: N/A   • Years of education: N/A     Occupational History   • Not on file.     Social History Main Topics   • Smoking status: Former Smoker   • Smokeless tobacco: Never Used   • Alcohol use No   • Drug use: Yes     Special: Marijuana      Comment: PER PRENATAL   • Sexual activity: Defer     Other Topics Concern   • Not on file     Social History Narrative     Past Medical History:   Diagnosis Date   • Migraine     no meds prescribed   • Urinary tract infection        Current Facility-Administered Medications:   •  acetaminophen (TYLENOL) tablet 650 mg, 650 mg, Oral, Q4H PRN, Esthela Caicedo DO  •  [COMPLETED] ampicillin 2 g/100 mL 0.9% NS (MBP), 2 g, Intravenous, Once, 2 g at 10/01/17 2000 **FOLLOWED BY** ampicillin 1 g/100 mL 0.9% NS (MBP), 1 g, Intravenous, Q4H, Esthela Caicedo DO, 1 g at 10/01/17 7858  •  butorphanol (STADOL) injection 1 mg, 1 mg, Intravenous, Q3H PRN, Esthela Caicedo DO  •  butorphanol (STADOL) injection 2 mg, 2 mg, Intravenous,  Q3H PRN, Esthela Caicedo DO  •  docusate sodium (COLACE) capsule 100 mg, 100 mg, Oral, BID PRN, Darryn Hernandez MD  •  ePHEDrine injection 10 mg, 10 mg, Intravenous, Q5 Min PRN, Lewis Storey MD  •  famotidine (PEPCID) injection 20 mg, 20 mg, Intravenous, Once PRN, Lewis Storey MD  •  influenza vac split quad (FLUZONE,FLUARIX,AFLURIA) injection 0.5 mL, 0.5 mL, Intramuscular, During Hospitalization, Esthela Caicedo DO  •  lactated ringers infusion, 125 mL/hr, Intravenous, Continuous, Esthela Caicedo DO, Last Rate: 125 mL/hr at 10/01/17 2135, 125 mL/hr at 10/01/17 2135  •  lidocaine PF 2% (XYLOCAINE) 2 % injection  - ADS Override Pull, , , ,   •  methylergonovine (METHERGINE) 0.2 MG/ML injection  - ADS Override Pull, , , ,   •  ondansetron (ZOFRAN) tablet 4 mg, 4 mg, Oral, Q6H PRN **OR** ondansetron ODT (ZOFRAN-ODT) disintegrating tablet 4 mg, 4 mg, Oral, Q6H PRN **OR** ondansetron (ZOFRAN) injection 4 mg, 4 mg, Intravenous, Q6H PRN, Esthela Caicedo DO  •  ondansetron (ZOFRAN) injection 4 mg, 4 mg, Intravenous, Once PRN, Lewis Storey MD  •  oxytocin (PITOCIN) in lactated Ringer's 1000 mL infusion solution, 2-30 cesar-units/min, Intravenous, Titrated, Esthela Caicedo DO, Stopped at 10/01/17 2348  •  prenatal vitamin 27-0.8 tablet 1 tablet, 1 tablet, Oral, Daily, Darryn Hernandez MD  •  psyllium (KONSYL) pack 1 packet, 1 packet, Oral, Daily, Esthela Caicedo DO  •  ropivacaine (NAROPIN) 0.2 % injection  - ADS Override Pull, , , ,   •  ropivacaine (NAROPIN) 0.2 % injection, 14 mL/hr, Epidural, Continuous, Lewis Storey MD, Last Rate: 14 mL/hr at 10/01/17 2103, 14 mL/hr at 10/01/17 2103  •  sodium chloride (NS) irrigation solution 1,000 mL, 1,000 mL, Irrigation, Once PRN, Esthela Caicedo, DO  •  sodium chloride 0.9 % flush 1-10 mL, 1-10 mL, Intravenous, PRN, Esthela Caicedo, DO    Facility-Administered Medications Ordered in Other Encounters:    •  lidocaine PF 2% (XYLOCAINE) injection, , Epidural, PRN, Lewis Storey MD, 10 mL at 10/01/17 2102  •  ropivacaine (NAROPIN) 0.2 % injection, , Epidural, PRN, Lewis Storey MD, 6 mL at 10/01/17 2102  No Known Allergies  Past Surgical History:   Procedure Laterality Date   • WISDOM TOOTH EXTRACTION      one was removed         Prenatal Information:   Maternal Prenatal Labs  Blood Type ABO Type   Date Value Ref Range Status   10/01/2017 A  Final      Rh Status RH type   Date Value Ref Range Status   10/01/2017 Positive  Final      Antibody Screen Antibody Screen   Date Value Ref Range Status   10/01/2017 Negative  Final      Rapid Urin Drug Screen Barbiturates Screen, Urine   Date Value Ref Range Status   10/01/2017 Negative Negative Final     Benzodiazepine Screen, Urine   Date Value Ref Range Status   10/01/2017 Negative Negative Final     Methadone Screen, Urine   Date Value Ref Range Status   10/01/2017 Negative Negative Final     Opiate Screen   Date Value Ref Range Status   10/01/2017 Negative Negative Final     THC, Screen, Urine   Date Value Ref Range Status   10/01/2017 Negative Negative Final     Cocaine Screen, Urine   Date Value Ref Range Status   10/01/2017 Negative Negative Final     Amphetamine Screen, Urine   Date Value Ref Range Status   10/01/2017 Negative Negative Final     Buprenorphine, Screen, Urine   Date Value Ref Range Status   10/01/2017 Negative Negative Final     Oxycodone Screen, Urine   Date Value Ref Range Status   10/01/2017 Negative Negative Final      Group B Strep Culture No results found for: GBSANTIGEN           External Prenatal Results         Pregnancy Outside Results - these were transcribed from office records.  See scanned records for details. Date Time   Hgb      Hct      ABO ^ A  03/01/17    Rh ^ Positive  03/01/17    Antibody Screen ^ Negative  03/01/17    Glucose Fasting GTT      Glucose Tolerance Test 1 hour      Glucose Tolerance Test 3 hour      Gonorrhea  (discrete) ^ NEG  17    Chlamydia (discrete) ^ NEG  17    RPR ^ Non-Reactive  17    VDRL      Syphillis Antibody      Rubella ^ Immune  17    HBsAg ^ Negative  17    Herpes Simplex Virus PCR      Herpes Simplex VIrus Culture      HIV ^ Negative  (A) 17    Hep C RNA Quant PCR      Hep C Antibody      Urine Drug Screen      AFP      Group B Strep ^ POS  17    GBS Susceptibility to Clindamycin      GBS Susceptibility to Eythromycin      Fetal Fibronectin      Genetic Testing, Maternal Blood             Legend: ^: Historical            Past OB History:     Obstetric History       T0      L0     SAB0   TAB0   Ectopic0   Multiple0   Live Births0       # Outcome Date GA Lbr Victor M/2nd Weight Sex Delivery Anes PTL Lv   2 Current            1 SAB                   Past Medical History: Past Medical History:   Diagnosis Date   • Migraine     no meds prescribed   • Urinary tract infection       Past Surgical History Past Surgical History:   Procedure Laterality Date   • WISDOM TOOTH EXTRACTION      one was removed      Family History: Family History   Problem Relation Age of Onset   • Diabetes Paternal Grandfather    • Hypertension Paternal Grandfather    • Hypertension Paternal Grandmother    • Hypertension Maternal Grandmother    • Hypertension Maternal Grandfather       Social History:  reports that she has quit smoking. She has never used smokeless tobacco.   reports that she does not drink alcohol.   reports that she uses illicit drugs, including Marijuana.        Review of Systems      Objective     Vital Signs Range for the last 24 hours  Temperature: Temp:  [96.8 °F (36 °C)-98.1 °F (36.7 °C)] 98.1 °F (36.7 °C)   Temp Source: Temp src: Oral   BP: BP: (119-150)/(66-96) 138/82   Pulse: Heart Rate:  [] 100   Respirations: Resp:  [20] 20   Weight: Weight:  [82.1 kg (181 lb)] 82.1 kg (181 lb)     Physical Examination: General appearance - alert, well appearing, and  in no distress, oriented to person, place, and time, normal appearing weight and well hydrated  Mental status - alert, oriented to person, place, and time, normal mood, behavior, speech, dress, motor activity, and thought processes, affect appropriate to mood  Neck - supple, no significant adenopathy  Chest - clear to auscultation, no wheezes, rales or rhonchi, symmetric air entry, no tachypnea, retractions or cyanosis  Heart - normal rate, regular rhythm, normal S1, S2, no murmurs, rubs, clicks or gallops  Abdomen - soft, nontender, gravid uterus, no masses or organomegaly  no rebound tenderness noted,   Pelvic - normal external genitalia, vulva, vagina, cervix, uterus and adnexa  Neurological - alert, oriented, normal speech, no focal findings or movement disorder noted  Musculoskeletal - no joint tenderness, deformity or swelling  Extremities - peripheral pulses normal, no pedal edema, no clubbing or cyanosis  Skin - normal coloration and turgor, no rashes, no suspicious skin lesions noted        Cervix: Exam by: Method: sterile exam per physician   Dilation: Dilation: 10   Effacement: Cervical Effacement: 100%   Station: Station: 1     Laboratory Results:   Lab Results (last 24 hours)     Procedure Component Value Units Date/Time    CBC (No Diff) [[id number]]  (Abnormal) Collected:  10/01/17 1955    Specimen:  Blood Updated:  10/01/17 2010     WBC 12.62 (H) 10*3/mm3      RBC 3.69 (L) 10*6/mm3      Hemoglobin 9.4 (L) g/dL      Hematocrit 29.6 (L) %      MCV 80.2 fL      MCH 25.5 (L) pg      MCHC 31.8 (L) g/dL      RDW 14.9 (H) %      RDW-SD 43.0 fl      MPV 11.7 (H) fL      Platelets 247 10*3/mm3     Urine Drug Screen - Urine, Clean Catch [[id number]]  (Normal) Collected:  10/01/17 2039    Specimen:  Urine from Urine, Clean Catch Updated:  10/01/17 2117     Amphetamine Screen, Urine Negative     Barbiturates Screen, Urine Negative     Benzodiazepine Screen, Urine Negative     Cocaine Screen, Urine Negative      Methadone Screen, Urine Negative     Opiate Screen Negative     Phencyclidine (PCP), Urine Negative     THC, Screen, Urine Negative     6-ACETYL MORPHINE Negative     Buprenorphine, Screen, Urine Negative     Oxycodone Screen, Urine Negative    Narrative:       Negative Thresholds For Drugs Screened:                  Amphetamines              1000 ng/ml               Barbiturates               200 ng/ml               Benzodiazepines            200 ng/ml              Cocaine                    300 ng/ml              Methadone                  300 ng/ml              Opiates                    300 ng/ml               Phencyclidine               25 ng/ml               THC                         50 ng/ml              6-Acetyl Morphine           10 ng/ml              Buprenorphine                5 ng/ml              Oxycodone                  300 ng/ml    The reference range for all drugs tested is negative. This report includes final unconfirmed qualitative results to be used for medical treatment purposes only. Unconfirmed results must not be used for non-medical purposes such as employment or legal testing. Clinical consideration should be applied to any drug of abuse test, especially when unconfirmed quantitative results are used.          Radiology Review:   Imaging Results (last 72 hours)     ** No results found for the last 72 hours. **            Assessment/Plan     Active Problems:    Normal labor      Assessment & Plan    Assessment:  1.  Intrauterine pregnancy at 39w2d weeks gestation with reassuring fetal status.    2.  labor  without ROM  3.  Obstetrical history significant for is non-contributory.  4.  GBS status: No results found for: GBSANTIGEN    Plan:  1. fetal and uterine monitoring  continuously and expectant management  2. Plan of care has been reviewed with patient   3.  Risks, benefits of treatment plan have been discussed.  4.  All questions have been answered.        Esthela Caicedo,    10/2/2017  2:36 AM       Electronically signed by Esthela Caicedo DO at 10/2/2017  2:37 AM           Operative/Procedure Notes (all)      Esthela Caicedo DO at 10/2/2017  2:37 AM  Version 1 of 1         Vaginal Delivery Procedure Note    Yvonne Barnett  Gestational Age-39.2 wks        OBGYN: Esthela Caicedo DO      Pre-op Diagnosis:   21 y/o  @ 39.2 wks in active labor      Anesthesia: Epidural        Detailed Description of Procedure     The patient was prepped and draped in normal sterile fashion. The head was delivered without difficulty. There was no nuchal cord. Anterior and posterior shoulders delivered without any problems. The rest of the infant was delivered in controlled fashion.The infant was bulb suctioned at delivery. The placenta delivered intact. The patient tolerated the procedure well.       Time of delivery: 222  Maternal Blood Type: A Positive  Fetal Gender: Female  Nuchal Cord: No  Tears: 1st deg midline laceration   Blood Cord: Yes  Estimated Blood Loss: 200cc  Placenta: Spontaneous, Delivered Intact   APGARS:  8  9          Disposition: Transfer to Women's Health Floor  Condition: Stable    Esthela Caicedo DO     Date: 10/2/2017  Time: 2:37 AM       Electronically signed by Esthela Caicedo DO at 10/2/2017  2:38 AM           Physician Progress Notes (all)      CRIS Cordero at 10/2/2017  8:38 AM  Version 1 of 1         [facility]  Vaginal Delivery Progress Note    Subjective   Subjective  Postpartum Day 0: Vaginal Delivery    The patient feels tired.  Her pain is well controlled with nonsteroidal anti-inflammatory drugs and opioid analgesics.   She is ambulating well.  Patient describes her bleeding as moderate lochia.    Breastfeeding: declines.    Objective     Objective   Vital Signs Range for the last 24 hours  Temperature: Temp:  [96.8 °F (36 °C)-98.6 °F (37 °C)] 98.5 °F (36.9 °C)   Temp Source: Temp src: Oral   BP: BP:  "(108-150)/(58-96) 115/75   Pulse: Heart Rate:  [] 66   Respirations: Resp:  [18-20] 20   Weight: Weight:  [181 lb (82.1 kg)] 181 lb (82.1 kg)     Admit Height:  Height: 61\" (154.9 cm)    Physical Exam:  General:  no acute distresss.  Abdomen: Fundus: appropriate, firm, non tender  Extremities: normal, atraumatic, no cyanosis, and trace edema.       [unfilled]       Lab Results   Component Value Date    ABO A 10/01/2017    RH Positive 10/01/2017        Lab Results   Component Value Date    HGB 9.4 (L) 10/01/2017    HCT 29.6 (L) 10/01/2017         Assessment/Plan   Assessment & Plan  Active Problems:    Normal labor      Yvonne Barnett is Day 0  post-partum  Vaginal, Spontaneous Delivery  terminal meconium .      Plan:  Continue current care.      CRIS Cordero  10/2/2017  8:39 AM       Electronically signed by CRIS Cordero at 10/2/2017  8:40 AM      FERNANDO Jewell at 10/3/2017  8:41 AM  Version 1 of [address] [place] [address]  Vaginal Delivery Progress Note    Subjective   Subjective  Postpartum Day 1: Vaginal Delivery    The patient feels tired.  Her pain is well controlled with nonsteroidal anti-inflammatory drugs and opioid analgesics.   She is ambulating well.  Patient describes her bleeding as moderate lochia.    Breastfeeding: declines.    Objective     Objective   Vital Signs Range for the last 24 hours  Temperature: Temp:  [98.9 °F (37.2 °C)] 98.9 °F (37.2 °C)    Temp Source: Temp src: Oral   BP: BP: (102)/(62) 102/62   Pulse: Heart Rate:  [92] 92   Respirations: Resp:  [18] 18   Weight:       Admit Height:  Height: 61\" (154.9 cm)    Physical Exam:  General:  no acute distresss.  Abdomen: Fundus: appropriate, firm, non tender  Extremities: normal, atraumatic, no cyanosis, and trace edema.       [unfilled]       Lab Results   Component Value Date    ABO A 10/01/2017    RH Positive 10/01/2017        Lab Results   Component Value Date    HGB 8.0 (L) 10/03/2017    HCT 26.4 (L) " 10/03/2017         Assessment/Plan   Assessment & Plan  Active Problems:    Normal labor      Yvonne Barnett is Day 1  post-partum  Vaginal, Spontaneous Delivery  terminal meconium .      Plan:  Continue current care.      FERNANDO Jewell  10/3/2017  8:41 AM       Electronically signed by FERNANDO Jewell at 10/3/2017  8:41 AM

## 2017-10-03 NOTE — PLAN OF CARE
Problem: Patient Care Overview (Adult)  Goal: Plan of Care Review  Outcome: Ongoing (interventions implemented as appropriate)    10/03/17 1836   Coping/Psychosocial Response Interventions   Plan Of Care Reviewed With patient   Patient Care Overview   Progress improving   Outcome Evaluation   Outcome Summary/Follow up Plan patient is doing well, tylenol for pain,       10/03/17 1836   Coping/Psychosocial Response Interventions   Plan Of Care Reviewed With patient   Patient Care Overview   Progress improving   Outcome Evaluation   Outcome Summary/Follow up Plan patient is doing well, tylenol for pain, small locia.     small locia.        Goal: Adult Individualization and Mutuality  Outcome: Ongoing (interventions implemented as appropriate)  Goal: Discharge Needs Assessment  Outcome: Ongoing (interventions implemented as appropriate)    Problem: Postpartum, Vaginal Delivery (Adult)  Goal: Signs and Symptoms of Listed Potential Problems Will be Absent or Manageable (Postpartum, Vaginal Delivery)  Outcome: Ongoing (interventions implemented as appropriate)

## 2017-10-03 NOTE — PROGRESS NOTES
" [facility]  Vaginal Delivery Progress Note    Subjective   Subjective  Postpartum Day 1: Vaginal Delivery    The patient feels tired.  Her pain is well controlled with nonsteroidal anti-inflammatory drugs and opioid analgesics.   She is ambulating well.  Patient describes her bleeding as moderate lochia.    Breastfeeding: declines.    Objective     Objective   Vital Signs Range for the last 24 hours  Temperature: Temp:  [98.9 °F (37.2 °C)] 98.9 °F (37.2 °C)   Temp Source: Temp src: Oral   BP: BP: (102)/(62) 102/62   Pulse: Heart Rate:  [92] 92   Respirations: Resp:  [18] 18   Weight:       Admit Height:  Height: 61\" (154.9 cm)    Physical Exam:  General:  no acute distresss.  Abdomen: Fundus: appropriate, firm, non tender  Extremities: normal, atraumatic, no cyanosis, and trace edema.       [unfilled]       Lab Results   Component Value Date    ABO A 10/01/2017    RH Positive 10/01/2017        Lab Results   Component Value Date    HGB 8.0 (L) 10/03/2017    HCT 26.4 (L) 10/03/2017         Assessment/Plan   Assessment & Plan  Active Problems:    Normal labor      Yvonne Barnett is Day 1  post-partum  Vaginal, Spontaneous Delivery  terminal meconium .      Plan:  Continue current care.      Angelia Severino, FERNANDO  10/3/2017  8:41 AM    "

## 2017-10-04 ENCOUNTER — APPOINTMENT (OUTPATIENT)
Dept: LABOR AND DELIVERY | Facility: HOSPITAL | Age: 22
End: 2017-10-04

## 2017-10-04 VITALS
WEIGHT: 181 LBS | SYSTOLIC BLOOD PRESSURE: 101 MMHG | TEMPERATURE: 97.7 F | OXYGEN SATURATION: 98 % | DIASTOLIC BLOOD PRESSURE: 66 MMHG | RESPIRATION RATE: 18 BRPM | HEIGHT: 61 IN | BODY MASS INDEX: 34.17 KG/M2 | HEART RATE: 56 BPM

## 2017-10-04 LAB
BASOPHILS # BLD AUTO: 0.02 10*3/MM3 (ref 0–0.3)
BASOPHILS NFR BLD AUTO: 0.2 % (ref 0–2)
DEPRECATED RDW RBC AUTO: 43.9 FL (ref 37–54)
EOSINOPHIL # BLD AUTO: 0.17 10*3/MM3 (ref 0–0.7)
EOSINOPHIL NFR BLD AUTO: 1.6 % (ref 0–5)
ERYTHROCYTE [DISTWIDTH] IN BLOOD BY AUTOMATED COUNT: 15.4 % (ref 11.5–14.5)
HCT VFR BLD AUTO: 27.7 % (ref 37–47)
HGB BLD-MCNC: 8.4 G/DL (ref 12–16)
IMM GRANULOCYTES # BLD: 0.03 10*3/MM3 (ref 0–0.03)
IMM GRANULOCYTES NFR BLD: 0.3 % (ref 0–0.5)
LYMPHOCYTES # BLD AUTO: 2.89 10*3/MM3 (ref 1–3)
LYMPHOCYTES NFR BLD AUTO: 27.6 % (ref 21–51)
MCH RBC QN AUTO: 24.9 PG (ref 27–33)
MCHC RBC AUTO-ENTMCNC: 30.3 G/DL (ref 33–37)
MCV RBC AUTO: 82 FL (ref 80–94)
MONOCYTES # BLD AUTO: 0.68 10*3/MM3 (ref 0.1–0.9)
MONOCYTES NFR BLD AUTO: 6.5 % (ref 0–10)
NEUTROPHILS # BLD AUTO: 6.68 10*3/MM3 (ref 1.4–6.5)
NEUTROPHILS NFR BLD AUTO: 63.8 % (ref 30–70)
PLATELET # BLD AUTO: 225 10*3/MM3 (ref 130–400)
PMV BLD AUTO: 11.5 FL (ref 6–10)
RBC # BLD AUTO: 3.38 10*6/MM3 (ref 4.2–5.4)
WBC NRBC COR # BLD: 10.47 10*3/MM3 (ref 4.5–12.5)

## 2017-10-04 PROCEDURE — 85025 COMPLETE CBC W/AUTO DIFF WBC: CPT | Performed by: OBSTETRICS & GYNECOLOGY

## 2017-10-04 RX ORDER — IBUPROFEN 600 MG/1
600 TABLET ORAL EVERY 6 HOURS PRN
Qty: 40 TABLET | Refills: 1 | Status: ON HOLD | OUTPATIENT
Start: 2017-10-04 | End: 2018-10-10

## 2017-10-04 RX ORDER — FERROUS SULFATE 325(65) MG
325 TABLET ORAL
Qty: 90 TABLET | Refills: 1 | Status: ON HOLD | OUTPATIENT
Start: 2017-10-04 | End: 2018-10-10

## 2017-10-04 RX ORDER — DOCUSATE SODIUM 100 MG/1
100 CAPSULE, LIQUID FILLED ORAL 2 TIMES DAILY
Qty: 60 CAPSULE | Refills: 0 | Status: SHIPPED | OUTPATIENT
Start: 2017-10-04 | End: 2017-11-03

## 2017-10-04 RX ORDER — IBUPROFEN 600 MG/1
600 TABLET ORAL EVERY 6 HOURS PRN
Qty: 30 TABLET | Refills: 0 | Status: SHIPPED | OUTPATIENT
Start: 2017-10-04 | End: 2017-11-03

## 2017-10-04 RX ADMIN — WITCH HAZEL 1 PAD: 500 SOLUTION RECTAL; TOPICAL at 14:33

## 2017-10-04 RX ADMIN — DOCUSATE SODIUM 100 MG: 100 CAPSULE, LIQUID FILLED ORAL at 09:03

## 2017-10-04 RX ADMIN — HYDROCODONE BITARTRATE AND ACETAMINOPHEN 1 TABLET: 5; 325 TABLET ORAL at 07:42

## 2017-10-04 RX ADMIN — BENZOCAINE AND MENTHOL: 20; .5 SPRAY TOPICAL at 14:33

## 2017-10-04 RX ADMIN — FERROUS SULFATE TAB 325 MG (65 MG ELEMENTAL FE) 325 MG: 325 (65 FE) TAB at 07:42

## 2017-10-04 RX ADMIN — IBUPROFEN 800 MG: 800 TABLET ORAL at 14:48

## 2017-10-04 RX ADMIN — PRAMOXINE HYDROCHLORIDE AND HYDROCORTISONE ACETATE: 100; 100 AEROSOL, FOAM TOPICAL at 14:33

## 2017-10-04 RX ADMIN — ACETAMINOPHEN 650 MG: 325 TABLET ORAL at 06:12

## 2017-10-04 NOTE — DISCHARGE SUMMARY
Discharge Summary    Reason for Hospitalization:  See below    Admission and Discharge Diagnoses:  See below    Course in Hospital:  This patient was admitted to the hospital in active labor.  She underwent vaginal delivery by Dr. Esthela Caicedo on October 2, 2017.  None well since delivery.  Corby made rounds on her yesterday and I dismissed her today.  I'm going to give her ibuprofen for pain.  She knows not to have intercourse.  She will come to the office and see us in 3 weeks for her first postpartum visit.  Her discharge diagnosis is term pregnancy, vaginal delivery.    Discharge Condition:  Stable.    Discharge Instructions and Plans for Follow-Up:  As above    Discharge Prescriptions:  As above

## 2017-10-04 NOTE — PROGRESS NOTES
"SHERRI Holland  Vaginal Delivery Progress Note    Subjective   Subjective  Postpartum Day 2: Vaginal Delivery    The patient feels tired.  Her pain is well controlled with nonsteroidal anti-inflammatory drugs and opioid analgesics.   She is ambulating well.  Patient describes her bleeding as moderate lochia.    Breastfeeding: declines.    Objective     Objective   Vital Signs Range for the last 24 hours  Temperature:     Temp Source:     BP:     Pulse:     Respirations:     Weight:       Admit Height:  Height: 61\" (154.9 cm)    Physical Exam:  General:  no acute distresss.  Abdomen: Fundus: appropriate, firm, non tender  Extremities: normal, atraumatic, no cyanosis, and trace edema.       [unfilled]       Lab Results   Component Value Date    ABO A 10/01/2017    RH Positive 10/01/2017        Lab Results   Component Value Date    HGB 8.0 (L) 10/03/2017    HCT 26.4 (L) 10/03/2017         Assessment/Plan   Assessment & Plan  Active Problems:    Normal labor    Term pregnancy delivered      Yvonne Barnett is Day 2  post-partum  Vaginal, Spontaneous Delivery  terminal meconium .      Plan:  Continue current care.      CRIS Cordero  10/4/2017  6:47 AM    "

## 2017-10-04 NOTE — PLAN OF CARE
Plan of Care Review Outcome(s) achieved      Adult Individualization and Mutuality Outcome(s) achieved      Discharge Needs Assessment Outcome(s) achieved      Signs and Symptoms of Listed Potential Problems Will be Absent or Manageable (Postpartum, Vaginal Delivery) Outcome(s) achieved

## 2017-10-04 NOTE — DISCHARGE SUMMARY
SHERRI Holland  Delivery Discharge Summary    Primary OB Clinician:     EDC: Estimated Date of Delivery: 10/7/17    Gestational Age:39w2d    Antepartum complications: none    Date of Delivery: 10/2/2017   Time of Delivery: 2:22 AM     Delivered By:  Esthela Caicedo     Delivery Type: Vaginal, Spontaneous Delivery      Tubal Ligation: n/a    Baby:Female   Apgar:  8   @ 1 minute /   Apgar:  9   @ 5 minutes   Weight: 7lb 3.3oz     Anesthesia: Epidural      Intrapartum complications: None    Laceration: Yes  Laceration Information  Laceration Repaired?   Perineal: 1st  Yes    Periurethral:         Labial:         Sulcus:         Vaginal: No       Cervical: No              Episiotomy: No    Placenta: Spontaneous     Feeding method: Bottlefeeding    [unfilled]       Lab Results   Component Value Date    ABO A 10/01/2017    RH Positive 10/01/2017        Lab Results   Component Value Date    HGB 8.4 (L) 10/04/2017    HCT 27.7 (L) 10/04/2017       Rh Immune globulin given: no      Discharge Date: 10/4/2017; Discharge Time: 8:54 AM        Plan:    Address and phone number verified and same.  Follow-up appointment with RC in 3 weeks.      CRIS Cordero  10/4/2017  8:54 AM

## 2017-10-05 NOTE — PAYOR COMM NOTE
"CONTACT:  CEFERINO ASHLEY RN, BSN  UTILIZATION MANAGEMENT DEPT.  [facility]  [address] [facility] [address]  PHONE:  [phone]  FAX: [phone]    MOM AND BABY DISCHARGED TO HOME ON 10/4/17. REFER TO AUTH # [id number]    Lisa Barnett (22 y.o. Female)     Date of Birth Social Security Number Address Home Phone MRN    1995  [address] [place] [id number] [phone] [id number]    Mormon Marital Status          None Single       Admission Date Admission Type Admitting Provider Attending Provider Department, Room/Bed    10/1/17 Elective Esthela Caicedo DO  [facility], W246/1    Discharge Date Discharge Disposition Discharge Destination        10/4/2017 Home or Self Care             Attending Provider: (none)    Allergies:  No Known Allergies    Isolation:  None   Infection:  None   Code Status:  Prior    Ht:  61\" (154.9 cm)   Wt:  181 lb (82.1 kg)    Admission Cmt:  None   Principal Problem:  None                Active Insurance as of 10/1/2017     Primary Coverage     Payor Plan Insurance Group Employer/Plan Group    [employer]HE[place] MEDICAID [place] MEDICAID [id number]     Payor Plan Address Payor Plan Phone Number Effective From Effective To    [address] [address] [phone] 5/1/2015     [place] [address]       Subscriber Name Subscriber Birth Date Member ID       LISA BARNETT 1995 [id number]                 Emergency Contacts      (Rel.) Home Phone Work Phone Mobile Phone    Abdiel Barnettyl (Other) [phone] -- --               Discharge Summary      CRIS Cordero at 10/4/2017  8:54 AM          [facility]  Delivery Discharge Summary    Primary OB Clinician:     EDC: Estimated Date of Delivery: 10/7/17    Gestational Age:39w2d    Antepartum complications: none    Date of Delivery: 10/2/2017   Time of Delivery: 2:22 AM     Delivered By:  Esthela Caicedo     Delivery Type: Vaginal, " Spontaneous Delivery      Tubal Ligation: n/a    Baby:Female   Apgar:  8   @ 1 minute /   Apgar:  9   @ 5 minutes   Weight: 7lb 3.3oz     Anesthesia: Epidural      Intrapartum complications: None    Laceration: Yes  Laceration Information  Laceration Repaired?   Perineal: 1st  Yes    Periurethral:         Labial:         Sulcus:         Vaginal: No       Cervical: No              Episiotomy: No    Placenta: Spontaneous     Feeding method: Bottlefeeding    [unfilled]       Lab Results   Component Value Date    ABO A 10/01/2017    RH Positive 10/01/2017        Lab Results   Component Value Date    HGB 8.4 (L) 10/04/2017    HCT 27.7 (L) 10/04/2017       Rh Immune globulin given: no      Discharge Date: 10/4/2017; Discharge Time: 8:54 AM        Plan:    Address and phone number verified and same.  Follow-up appointment with  in 3 weeks.      CRIS Cordero  10/4/2017  8:54 AM     Electronically signed by CRIS Cordero at 10/4/2017  9:03 AM      Darryn Hernandez MD at 10/4/2017  9:29 AM          Discharge Summary    Reason for Hospitalization:  See below    Admission and Discharge Diagnoses:  See below    Course in Hospital:  This patient was admitted to the hospital in active labor.  She underwent vaginal delivery by Dr. Esthela Caicedo on 2017.  None well since delivery.  Corby made rounds on her yesterday and I dismissed her today.  I'm going to give her ibuprofen for pain.  She knows not to have intercourse.  She will come to the office and see us in 3 weeks for her first postpartum visit.  Her discharge diagnosis is term pregnancy, vaginal delivery.    Discharge Condition:  Stable.    Discharge Instructions and Plans for Follow-Up:  As above    Discharge Prescriptions:  As above       Electronically signed by Darryn Hernandez MD at 10/4/2017  9:30 AM

## 2018-03-10 ENCOUNTER — HOSPITAL ENCOUNTER (EMERGENCY)
Facility: HOSPITAL | Age: 23
Discharge: HOME | End: 2018-03-10
Attending: EMERGENCY MEDICINE | Admitting: EMERGENCY MEDICINE

## 2018-03-10 ENCOUNTER — APPOINTMENT (OUTPATIENT)
Dept: CT IMAGING | Facility: HOSPITAL | Age: 23
End: 2018-03-10

## 2018-03-10 VITALS
BODY MASS INDEX: 28.52 KG/M2 | WEIGHT: 155 LBS | HEIGHT: 62 IN | OXYGEN SATURATION: 99 % | SYSTOLIC BLOOD PRESSURE: 126 MMHG | HEART RATE: 85 BPM | DIASTOLIC BLOOD PRESSURE: 75 MMHG | TEMPERATURE: 98.2 F | RESPIRATION RATE: 17 BRPM

## 2018-03-10 DIAGNOSIS — N30.01 ACUTE CYSTITIS WITH HEMATURIA: Primary | ICD-10-CM

## 2018-03-10 LAB
ALBUMIN SERPL-MCNC: 4.4 G/DL (ref 3.5–5)
ALBUMIN/GLOB SERPL: 1.4 G/DL (ref 1.5–2.5)
ALP SERPL-CCNC: 78 U/L (ref 35–104)
ALT SERPL W P-5'-P-CCNC: 21 U/L (ref 10–36)
ANION GAP SERPL CALCULATED.3IONS-SCNC: 4.1 MMOL/L (ref 3.6–11.2)
AST SERPL-CCNC: 22 U/L (ref 10–30)
B-HCG UR QL: NEGATIVE
BACTERIA UR QL AUTO: ABNORMAL /HPF
BASOPHILS # BLD AUTO: 0.02 10*3/MM3 (ref 0–0.3)
BASOPHILS NFR BLD AUTO: 0.2 % (ref 0–2)
BILIRUB SERPL-MCNC: 0.7 MG/DL (ref 0.2–1.8)
BILIRUB UR QL STRIP: NEGATIVE
BUN BLD-MCNC: 12 MG/DL (ref 7–21)
BUN/CREAT SERPL: 17.4 (ref 7–25)
CALCIUM SPEC-SCNC: 9.7 MG/DL (ref 7.7–10)
CHLORIDE SERPL-SCNC: 108 MMOL/L (ref 99–112)
CLARITY UR: ABNORMAL
CO2 SERPL-SCNC: 25.9 MMOL/L (ref 24.3–31.9)
COLOR UR: ABNORMAL
CREAT BLD-MCNC: 0.69 MG/DL (ref 0.43–1.29)
DEPRECATED RDW RBC AUTO: 41.9 FL (ref 37–54)
EOSINOPHIL # BLD AUTO: 0.04 10*3/MM3 (ref 0–0.7)
EOSINOPHIL NFR BLD AUTO: 0.3 % (ref 0–5)
ERYTHROCYTE [DISTWIDTH] IN BLOOD BY AUTOMATED COUNT: 14.1 % (ref 11.5–14.5)
GFR SERPL CREATININE-BSD FRML MDRD: 107 ML/MIN/1.73
GLOBULIN UR ELPH-MCNC: 3.1 GM/DL
GLUCOSE BLD-MCNC: 90 MG/DL (ref 70–110)
GLUCOSE UR STRIP-MCNC: NEGATIVE MG/DL
HCT VFR BLD AUTO: 38.9 % (ref 37–47)
HGB BLD-MCNC: 12.9 G/DL (ref 12–16)
HGB UR QL STRIP.AUTO: ABNORMAL
HYALINE CASTS UR QL AUTO: ABNORMAL /LPF
IMM GRANULOCYTES # BLD: 0.03 10*3/MM3 (ref 0–0.03)
IMM GRANULOCYTES NFR BLD: 0.2 % (ref 0–0.5)
KETONES UR QL STRIP: ABNORMAL
LEUKOCYTE ESTERASE UR QL STRIP.AUTO: ABNORMAL
LIPASE SERPL-CCNC: 35 U/L (ref 13–60)
LYMPHOCYTES # BLD AUTO: 1.88 10*3/MM3 (ref 1–3)
LYMPHOCYTES NFR BLD AUTO: 14.8 % (ref 21–51)
MCH RBC QN AUTO: 27.4 PG (ref 27–33)
MCHC RBC AUTO-ENTMCNC: 33.2 G/DL (ref 33–37)
MCV RBC AUTO: 82.6 FL (ref 80–94)
MONOCYTES # BLD AUTO: 1.06 10*3/MM3 (ref 0.1–0.9)
MONOCYTES NFR BLD AUTO: 8.4 % (ref 0–10)
NEUTROPHILS # BLD AUTO: 9.64 10*3/MM3 (ref 1.4–6.5)
NEUTROPHILS NFR BLD AUTO: 76.1 % (ref 30–70)
NITRITE UR QL STRIP: POSITIVE
OSMOLALITY SERPL CALC.SUM OF ELEC: 275 MOSM/KG (ref 273–305)
PH UR STRIP.AUTO: 5.5 [PH] (ref 5–8)
PLATELET # BLD AUTO: 261 10*3/MM3 (ref 130–400)
PMV BLD AUTO: 11.4 FL (ref 6–10)
POTASSIUM BLD-SCNC: 3.9 MMOL/L (ref 3.5–5.3)
PROT SERPL-MCNC: 7.5 G/DL (ref 6–8)
PROT UR QL STRIP: ABNORMAL
RBC # BLD AUTO: 4.71 10*6/MM3 (ref 4.2–5.4)
RBC # UR: ABNORMAL /HPF
REF LAB TEST METHOD: ABNORMAL
SODIUM BLD-SCNC: 138 MMOL/L (ref 135–153)
SP GR UR STRIP: 1.02 (ref 1–1.03)
SQUAMOUS #/AREA URNS HPF: ABNORMAL /HPF
UROBILINOGEN UR QL STRIP: ABNORMAL
WBC NRBC COR # BLD: 12.67 10*3/MM3 (ref 4.5–12.5)
WBC UR QL AUTO: ABNORMAL /HPF

## 2018-03-10 PROCEDURE — 0 IOPAMIDOL 61 % SOLUTION: Performed by: EMERGENCY MEDICINE

## 2018-03-10 PROCEDURE — 81025 URINE PREGNANCY TEST: CPT | Performed by: PHYSICIAN ASSISTANT

## 2018-03-10 PROCEDURE — 83690 ASSAY OF LIPASE: CPT | Performed by: PHYSICIAN ASSISTANT

## 2018-03-10 PROCEDURE — 74177 CT ABD & PELVIS W/CONTRAST: CPT | Performed by: RADIOLOGY

## 2018-03-10 PROCEDURE — 85025 COMPLETE CBC W/AUTO DIFF WBC: CPT | Performed by: PHYSICIAN ASSISTANT

## 2018-03-10 PROCEDURE — 25010000002 ONDANSETRON PER 1 MG: Performed by: PHYSICIAN ASSISTANT

## 2018-03-10 PROCEDURE — 81001 URINALYSIS AUTO W/SCOPE: CPT | Performed by: PHYSICIAN ASSISTANT

## 2018-03-10 PROCEDURE — 74177 CT ABD & PELVIS W/CONTRAST: CPT

## 2018-03-10 PROCEDURE — 96365 THER/PROPH/DIAG IV INF INIT: CPT

## 2018-03-10 PROCEDURE — 96375 TX/PRO/DX INJ NEW DRUG ADDON: CPT

## 2018-03-10 PROCEDURE — 87086 URINE CULTURE/COLONY COUNT: CPT | Performed by: PHYSICIAN ASSISTANT

## 2018-03-10 PROCEDURE — 80053 COMPREHEN METABOLIC PANEL: CPT | Performed by: PHYSICIAN ASSISTANT

## 2018-03-10 PROCEDURE — 99283 EMERGENCY DEPT VISIT LOW MDM: CPT

## 2018-03-10 PROCEDURE — 87077 CULTURE AEROBIC IDENTIFY: CPT | Performed by: PHYSICIAN ASSISTANT

## 2018-03-10 PROCEDURE — 25010000002 KETOROLAC TROMETHAMINE PER 15 MG: Performed by: PHYSICIAN ASSISTANT

## 2018-03-10 PROCEDURE — 25010000002 CEFTRIAXONE: Performed by: PHYSICIAN ASSISTANT

## 2018-03-10 PROCEDURE — 87186 SC STD MICRODIL/AGAR DIL: CPT | Performed by: PHYSICIAN ASSISTANT

## 2018-03-10 RX ORDER — NITROFURANTOIN 25; 75 MG/1; MG/1
100 CAPSULE ORAL EVERY 12 HOURS SCHEDULED
Qty: 14 CAPSULE | Refills: 0 | Status: SHIPPED | OUTPATIENT
Start: 2018-03-10 | End: 2018-03-17

## 2018-03-10 RX ORDER — KETOROLAC TROMETHAMINE 30 MG/ML
30 INJECTION, SOLUTION INTRAMUSCULAR; INTRAVENOUS ONCE
Status: COMPLETED | OUTPATIENT
Start: 2018-03-10 | End: 2018-03-10

## 2018-03-10 RX ORDER — PHENAZOPYRIDINE HYDROCHLORIDE 200 MG/1
200 TABLET, FILM COATED ORAL 3 TIMES DAILY PRN
Qty: 6 TABLET | Refills: 0 | Status: SHIPPED | OUTPATIENT
Start: 2018-03-10 | End: 2018-03-12

## 2018-03-10 RX ORDER — ONDANSETRON 4 MG/1
4 TABLET, ORALLY DISINTEGRATING ORAL EVERY 8 HOURS PRN
Qty: 15 TABLET | Refills: 0 | Status: ON HOLD | OUTPATIENT
Start: 2018-03-10 | End: 2018-10-22 | Stop reason: HOSPADM

## 2018-03-10 RX ORDER — SODIUM CHLORIDE 0.9 % (FLUSH) 0.9 %
10 SYRINGE (ML) INJECTION AS NEEDED
Status: DISCONTINUED | OUTPATIENT
Start: 2018-03-10 | End: 2018-03-10 | Stop reason: HOSPADM

## 2018-03-10 RX ORDER — ONDANSETRON 2 MG/ML
4 INJECTION INTRAMUSCULAR; INTRAVENOUS ONCE
Status: COMPLETED | OUTPATIENT
Start: 2018-03-10 | End: 2018-03-10

## 2018-03-10 RX ADMIN — CEFTRIAXONE 1 G: 1 INJECTION, POWDER, FOR SOLUTION INTRAMUSCULAR; INTRAVENOUS at 18:44

## 2018-03-10 RX ADMIN — ONDANSETRON 4 MG: 2 INJECTION INTRAMUSCULAR; INTRAVENOUS at 18:40

## 2018-03-10 RX ADMIN — SODIUM CHLORIDE 1000 ML: 9 INJECTION, SOLUTION INTRAVENOUS at 18:38

## 2018-03-10 RX ADMIN — IOPAMIDOL 100 ML: 612 INJECTION, SOLUTION INTRAVENOUS at 19:03

## 2018-03-10 RX ADMIN — KETOROLAC TROMETHAMINE 30 MG: 30 INJECTION, SOLUTION INTRAMUSCULAR at 18:43

## 2018-03-11 NOTE — ED PROVIDER NOTES
Subjective     History provided by:  Patient   used: No    Abdominal Pain   Pain location:  R flank and RLQ  Pain quality: sharp    Pain radiates to:  Does not radiate  Pain severity:  Moderate  Onset quality:  Sudden  Duration:  1 day  Timing:  Constant  Progression:  Unchanged  Chronicity:  New  Context: not recent illness, not sick contacts and not suspicious food intake    Relieved by:  None tried  Worsened by:  Palpation and urination  Ineffective treatments:  None tried  Associated symptoms: diarrhea, dysuria and nausea    Associated symptoms: no chest pain, no constipation, no cough, no fever, no shortness of breath, no sore throat and no vomiting    Risk factors: no NSAID use, not obese and not pregnant        Review of Systems   Constitutional: Negative for activity change and fever.   HENT: Negative for congestion, rhinorrhea and sore throat.    Eyes: Negative for pain.   Respiratory: Negative for cough, shortness of breath and wheezing.    Cardiovascular: Negative for chest pain.   Gastrointestinal: Positive for abdominal pain, diarrhea and nausea. Negative for abdominal distention, constipation and vomiting.   Genitourinary: Positive for difficulty urinating, dysuria and frequency.   Musculoskeletal: Negative for arthralgias and myalgias.   Skin: Negative for rash and wound.   Neurological: Negative for dizziness and headaches.   Psychiatric/Behavioral: Negative for agitation.   All other systems reviewed and are negative.      Past Medical History:   Diagnosis Date   • Migraine     no meds prescribed   • Urinary tract infection        No Known Allergies    Past Surgical History:   Procedure Laterality Date   • WISDOM TOOTH EXTRACTION      one was removed       Family History   Problem Relation Age of Onset   • Diabetes Paternal Grandfather    • Hypertension Paternal Grandfather    • Hypertension Paternal Grandmother    • Hypertension Maternal Grandmother    • Hypertension Maternal  Grandfather        Social History     Social History   • Marital status: Single     Social History Main Topics   • Smoking status: Former Smoker   • Smokeless tobacco: Never Used   • Alcohol use No   • Drug use: No   • Sexual activity: Defer     Other Topics Concern   • Not on file           Objective   Physical Exam   Constitutional: She is oriented to person, place, and time. She appears well-developed and well-nourished.   HENT:   Head: Normocephalic and atraumatic.   Eyes: EOM are normal. Pupils are equal, round, and reactive to light.   Neck: Normal range of motion. Neck supple.   Cardiovascular: Normal rate, regular rhythm and normal heart sounds.    Pulmonary/Chest: Effort normal and breath sounds normal.           Abdominal: Soft. Bowel sounds are normal. There is tenderness in the right lower quadrant.   Musculoskeletal: Normal range of motion.   Neurological: She is alert and oriented to person, place, and time.   Skin: Skin is warm and dry.   Psychiatric: She has a normal mood and affect. Her behavior is normal. Judgment and thought content normal.   Nursing note and vitals reviewed.      Procedures         ED Course  ED Course                  MDM  Number of Diagnoses or Management Options     Amount and/or Complexity of Data Reviewed  Clinical lab tests: ordered and reviewed  Tests in the radiology section of CPT®: ordered and reviewed  Tests in the medicine section of CPT®: ordered and reviewed  Independent visualization of images, tracings, or specimens: yes    Patient Progress  Patient progress: stable      Final diagnoses:   Acute cystitis with hematuria            CRIS Scanlon  03/10/18 1947

## 2018-03-12 LAB
BACTERIA SPEC AEROBE CULT: ABNORMAL
BACTERIA SPEC AEROBE CULT: ABNORMAL

## 2018-03-30 LAB
EXTERNAL CHLAMYDIA SCREEN: NEGATIVE
EXTERNAL GONORRHEA SCREEN: NEGATIVE
EXTERNAL GROUP B STREP ANTIGEN: NORMAL
EXTERNAL RUBELLA QUALITATIVE: NORMAL

## 2018-10-10 ENCOUNTER — HOSPITAL ENCOUNTER (INPATIENT)
Facility: HOSPITAL | Age: 23
LOS: 4 days | Discharge: HOME | End: 2018-10-14
Attending: OBSTETRICS & GYNECOLOGY | Admitting: OBSTETRICS & GYNECOLOGY

## 2018-10-10 PROBLEM — O60.00 PRETERM LABOR: Status: ACTIVE | Noted: 2018-10-10

## 2018-10-10 PROBLEM — Z34.90 PREGNANCY: Status: ACTIVE | Noted: 2018-10-10

## 2018-10-10 LAB
ABO GROUP BLD: NORMAL
BACTERIA UR QL AUTO: ABNORMAL /HPF
BASOPHILS # BLD AUTO: 0.01 10*3/MM3 (ref 0–0.3)
BASOPHILS NFR BLD AUTO: 0.1 % (ref 0–2)
BILIRUB UR QL STRIP: NEGATIVE
BLD GP AB SCN SERPL QL: NEGATIVE
CLARITY UR: ABNORMAL
COLOR UR: YELLOW
DEPRECATED RDW RBC AUTO: 41.5 FL (ref 37–54)
EOSINOPHIL # BLD AUTO: 0 10*3/MM3 (ref 0–0.7)
EOSINOPHIL NFR BLD AUTO: 0 % (ref 0–5)
ERYTHROCYTE [DISTWIDTH] IN BLOOD BY AUTOMATED COUNT: 13.6 % (ref 11.5–14.5)
GLUCOSE UR STRIP-MCNC: NEGATIVE MG/DL
HCT VFR BLD AUTO: 31.4 % (ref 37–47)
HGB BLD-MCNC: 9.9 G/DL (ref 12–16)
HGB UR QL STRIP.AUTO: ABNORMAL
HYALINE CASTS UR QL AUTO: ABNORMAL /LPF
IMM GRANULOCYTES # BLD: 0.03 10*3/MM3 (ref 0–0.03)
IMM GRANULOCYTES NFR BLD: 0.2 % (ref 0–0.5)
KETONES UR QL STRIP: ABNORMAL
LEUKOCYTE ESTERASE UR QL STRIP.AUTO: ABNORMAL
LYMPHOCYTES # BLD AUTO: 1.04 10*3/MM3 (ref 1–3)
LYMPHOCYTES NFR BLD AUTO: 7.5 % (ref 21–51)
MCH RBC QN AUTO: 26.3 PG (ref 27–33)
MCHC RBC AUTO-ENTMCNC: 31.5 G/DL (ref 33–37)
MCV RBC AUTO: 83.5 FL (ref 80–94)
MONOCYTES # BLD AUTO: 0.15 10*3/MM3 (ref 0.1–0.9)
MONOCYTES NFR BLD AUTO: 1.1 % (ref 0–10)
NEUTROPHILS # BLD AUTO: 12.61 10*3/MM3 (ref 1.4–6.5)
NEUTROPHILS NFR BLD AUTO: 91.1 % (ref 30–70)
NITRITE UR QL STRIP: NEGATIVE
PH UR STRIP.AUTO: 6.5 [PH] (ref 5–8)
PLATELET # BLD AUTO: 219 10*3/MM3 (ref 130–400)
PMV BLD AUTO: 11.3 FL (ref 6–10)
PROT UR QL STRIP: NEGATIVE
RBC # BLD AUTO: 3.76 10*6/MM3 (ref 4.2–5.4)
RBC # UR: ABNORMAL /HPF
REF LAB TEST METHOD: ABNORMAL
RH BLD: POSITIVE
SP GR UR STRIP: <=1.005 (ref 1–1.03)
SQUAMOUS #/AREA URNS HPF: ABNORMAL /HPF
T&S EXPIRATION DATE: NORMAL
UROBILINOGEN UR QL STRIP: ABNORMAL
WBC NRBC COR # BLD: 13.84 10*3/MM3 (ref 4.5–12.5)
WBC UR QL AUTO: ABNORMAL /HPF

## 2018-10-10 PROCEDURE — 85025 COMPLETE CBC W/AUTO DIFF WBC: CPT | Performed by: OBSTETRICS & GYNECOLOGY

## 2018-10-10 PROCEDURE — 81001 URINALYSIS AUTO W/SCOPE: CPT | Performed by: OBSTETRICS & GYNECOLOGY

## 2018-10-10 PROCEDURE — 86900 BLOOD TYPING SEROLOGIC ABO: CPT | Performed by: OBSTETRICS & GYNECOLOGY

## 2018-10-10 PROCEDURE — 59025 FETAL NON-STRESS TEST: CPT

## 2018-10-10 PROCEDURE — 86901 BLOOD TYPING SEROLOGIC RH(D): CPT | Performed by: OBSTETRICS & GYNECOLOGY

## 2018-10-10 PROCEDURE — 25010000002 TERBUTALINE PER 1 MG: Performed by: OBSTETRICS & GYNECOLOGY

## 2018-10-10 PROCEDURE — 25010000002 ONDANSETRON PER 1 MG

## 2018-10-10 PROCEDURE — 25010000002 MAGNESIUM SULFATE 40 GM/1000ML SOLUTION

## 2018-10-10 PROCEDURE — 86850 RBC ANTIBODY SCREEN: CPT | Performed by: OBSTETRICS & GYNECOLOGY

## 2018-10-10 PROCEDURE — 87186 SC STD MICRODIL/AGAR DIL: CPT | Performed by: OBSTETRICS & GYNECOLOGY

## 2018-10-10 PROCEDURE — 87086 URINE CULTURE/COLONY COUNT: CPT | Performed by: OBSTETRICS & GYNECOLOGY

## 2018-10-10 PROCEDURE — 87081 CULTURE SCREEN ONLY: CPT | Performed by: OBSTETRICS & GYNECOLOGY

## 2018-10-10 PROCEDURE — 25010000002 BETAMETHASONE ACET & SOD PHOS PER 4 MG: Performed by: OBSTETRICS & GYNECOLOGY

## 2018-10-10 PROCEDURE — 87077 CULTURE AEROBIC IDENTIFY: CPT | Performed by: OBSTETRICS & GYNECOLOGY

## 2018-10-10 RX ORDER — BETAMETHASONE SODIUM PHOSPHATE AND BETAMETHASONE ACETATE 3; 3 MG/ML; MG/ML
12 INJECTION, SUSPENSION INTRA-ARTICULAR; INTRALESIONAL; INTRAMUSCULAR; SOFT TISSUE EVERY 12 HOURS
Status: DISCONTINUED | OUTPATIENT
Start: 2018-10-11 | End: 2018-10-11

## 2018-10-10 RX ORDER — SODIUM CHLORIDE, SODIUM LACTATE, POTASSIUM CHLORIDE, CALCIUM CHLORIDE 600; 310; 30; 20 MG/100ML; MG/100ML; MG/100ML; MG/100ML
75 INJECTION, SOLUTION INTRAVENOUS CONTINUOUS
Status: DISCONTINUED | OUTPATIENT
Start: 2018-10-10 | End: 2018-10-13

## 2018-10-10 RX ORDER — ONDANSETRON 2 MG/ML
INJECTION INTRAMUSCULAR; INTRAVENOUS
Status: COMPLETED
Start: 2018-10-10 | End: 2018-10-10

## 2018-10-10 RX ORDER — MAGNESIUM SULFATE HEPTAHYDRATE 40 MG/ML
2 INJECTION, SOLUTION INTRAVENOUS CONTINUOUS
Status: DISCONTINUED | OUTPATIENT
Start: 2018-10-10 | End: 2018-10-11

## 2018-10-10 RX ORDER — TERBUTALINE SULFATE 1 MG/ML
0.25 INJECTION, SOLUTION SUBCUTANEOUS ONCE
Status: COMPLETED | OUTPATIENT
Start: 2018-10-10 | End: 2018-10-10

## 2018-10-10 RX ORDER — BETAMETHASONE SODIUM PHOSPHATE AND BETAMETHASONE ACETATE 3; 3 MG/ML; MG/ML
12 INJECTION, SUSPENSION INTRA-ARTICULAR; INTRALESIONAL; INTRAMUSCULAR; SOFT TISSUE EVERY 24 HOURS
Status: DISCONTINUED | OUTPATIENT
Start: 2018-10-10 | End: 2018-10-10

## 2018-10-10 RX ORDER — ONDANSETRON 4 MG/1
4 TABLET, ORALLY DISINTEGRATING ORAL EVERY 8 HOURS PRN
Status: CANCELLED | OUTPATIENT
Start: 2018-10-10

## 2018-10-10 RX ORDER — ONDANSETRON 2 MG/ML
4 INJECTION INTRAMUSCULAR; INTRAVENOUS EVERY 6 HOURS PRN
Status: DISCONTINUED | OUTPATIENT
Start: 2018-10-10 | End: 2018-10-14 | Stop reason: HOSPADM

## 2018-10-10 RX ORDER — PRENATAL VIT/IRON FUM/FOLIC AC 27MG-0.8MG
1 TABLET ORAL NIGHTLY
Status: DISCONTINUED | OUTPATIENT
Start: 2018-10-11 | End: 2018-10-10

## 2018-10-10 RX ORDER — PRENATAL VIT/IRON FUM/FOLIC AC 27MG-0.8MG
1 TABLET ORAL NIGHTLY
Status: DISCONTINUED | OUTPATIENT
Start: 2018-10-11 | End: 2018-10-14 | Stop reason: HOSPADM

## 2018-10-10 RX ORDER — MAGNESIUM SULFATE HEPTAHYDRATE 40 MG/ML
INJECTION, SOLUTION INTRAVENOUS
Status: COMPLETED
Start: 2018-10-10 | End: 2018-10-10

## 2018-10-10 RX ORDER — PROMETHAZINE HYDROCHLORIDE 25 MG/ML
12.5 INJECTION, SOLUTION INTRAMUSCULAR; INTRAVENOUS EVERY 6 HOURS PRN
Status: DISCONTINUED | OUTPATIENT
Start: 2018-10-10 | End: 2018-10-14 | Stop reason: HOSPADM

## 2018-10-10 RX ADMIN — SODIUM CHLORIDE, POTASSIUM CHLORIDE, SODIUM LACTATE AND CALCIUM CHLORIDE 1000 ML: 600; 310; 30; 20 INJECTION, SOLUTION INTRAVENOUS at 16:56

## 2018-10-10 RX ADMIN — BETAMETHASONE SODIUM PHOSPHATE AND BETAMETHASONE ACETATE 12 MG: 3; 3 INJECTION, SUSPENSION INTRA-ARTICULAR; INTRALESIONAL; INTRAMUSCULAR at 15:40

## 2018-10-10 RX ADMIN — SODIUM CHLORIDE, POTASSIUM CHLORIDE, SODIUM LACTATE AND CALCIUM CHLORIDE 75 ML/HR: 600; 310; 30; 20 INJECTION, SOLUTION INTRAVENOUS at 19:22

## 2018-10-10 RX ADMIN — MAGNESIUM SULFATE IN WATER 40 G: 40 INJECTION, SOLUTION INTRAVENOUS at 17:44

## 2018-10-10 RX ADMIN — AMPICILLIN SODIUM 1 G: 1 INJECTION, POWDER, FOR SOLUTION INTRAMUSCULAR; INTRAVENOUS at 22:19

## 2018-10-10 RX ADMIN — AMPICILLIN SODIUM 2 G: 2 INJECTION, POWDER, FOR SOLUTION INTRAMUSCULAR; INTRAVENOUS at 17:58

## 2018-10-10 RX ADMIN — TERBUTALINE SULFATE 0.25 MG: 1 INJECTION SUBCUTANEOUS at 17:32

## 2018-10-10 RX ADMIN — ONDANSETRON 4 MG: 2 INJECTION, SOLUTION INTRAMUSCULAR; INTRAVENOUS at 17:41

## 2018-10-10 RX ADMIN — TERBUTALINE SULFATE 0.25 MG: 1 INJECTION SUBCUTANEOUS at 16:49

## 2018-10-10 RX ADMIN — Medication 2 G: at 17:58

## 2018-10-10 NOTE — NON STRESS TEST
Yvonne Morabeth Ericka, a  at 33w4d with an AAKASH of 2018, by Patient Reported, was seen at [facility] LABOR DELIVERY for a nonstress test.    Chief Complaint   Patient presents with   • Non-stress Test     dilated 1 cm in office       Patient Active Problem List   Diagnosis   • Decreased fetal movement in pregnancy   • Normal labor   • Term pregnancy delivered   • Pregnancy   •  labor       Start Time:   Stop Time:     Interpretation A  Nonstress Test Interpretation A: Reactive (10/10/18 1915 : Lauren Kelly, RN)  Comments A: VERIFIED PER GABRIELLE BAL RN.  (10/10/18 1915 : Lauren Kelly, ALBERTO)

## 2018-10-10 NOTE — NON STRESS TEST
Yvonne Morabeth Ericka, a  at 33w4d with an AAKASH of 2018, by Patient Reported, was seen at [facility] LABOR DELIVERY for a nonstress test.    Chief Complaint   Patient presents with   • Non-stress Test     dilated 1 cm in office       Patient Active Problem List   Diagnosis   • Decreased fetal movement in pregnancy   • Normal labor   • Term pregnancy delivered   • Pregnancy       Start Time: 151  Stop Time: 1540    Interpretation A  Nonstress Test Interpretation A: Reactive (10/10/18 1540 : Blanca Wang, RN)  Comments A: verified by sunshine li rn (10/10/18 1540 : Blanca Wang, RN)

## 2018-10-10 NOTE — H&P
SHERRI Holland  Obstetric History and Physical    Chief Complaint   Patient presents with   • Non-stress Test     dilated 1 cm in office       Subjective     Patient is a 23 y.o. female  currently at 33w4d, who presents with  contractions and 1cm cervix sent from the office for observation. She denies LOF, VB.  Admits to good fetal movement.     Her prenatal care is benign.  Her previous obstetric/gynecological history is noted for is non-contributory.    The following portions of the patients history were reviewed and updated as appropriate: current medications, allergies, past medical history, past surgical history, past family history, past social history and problem list .       Prenatal Information:   Maternal Prenatal Labs  Blood Type No results found for: ABO   Rh Status No results found for: RH   Antibody Screen No results found for: ABSCRN   Rapid Urin Drug Screen No results found for: AMPMETHU, BARBITSCNUR, LABBENZSCN, LABMETHSCN, LABOPIASCN, THCURSCR, COCAINEUR, AMPHETSCREEN, PROPOXSCN, BUPRENORSCNU, METAMPSCNUR, OXYCODONESCN, TRICYCLICSCN   Group B Strep Culture No results found for: GBSANTIGEN           External Prenatal Results     Pregnancy Outside Results - Transcribed From Office Records - See Scanned Records For Details     Test Value Date Time    Hgb 12.9 g/dL 03/10/18 1732    Hct 38.9 % 03/10/18 1732    ABO A  10/01/17 1955    Rh Positive  10/01/17 1955    Antibody Screen Negative  10/01/17 1955    Glucose Fasting GTT       Glucose Tolerance Test 1 hour       Glucose Tolerance Test 3 hour       Gonorrhea (discrete) NEG  17     Chlamydia (discrete) NEG  17     RPR Non-Reactive  17     VDRL       Syphilis Antibody       Rubella Immune  17     HBsAg Negative  17     Herpes Simplex Virus PCR       Herpes Simplex VIrus Culture       HIV       Hep C RNA Quant PCR       Hep C Antibody       AFP       Group B Strep POS  17     GBS Susceptibility to Clindamycin        GBS Susceptibility to Erythromycin       Fetal Fibronectin       Genetic Testing, Maternal Blood             Drug Screening     Test Value Date Time    Urine Drug Screen       Amphetamine Screen Negative  10/01/17 2039    Barbiturate Screen Negative  10/01/17 2039    Benzodiazepine Screen Negative  10/01/17 2039    Methadone Screen Negative  10/01/17 2039    Phencyclidine Screen Negative  10/01/17 2039    Opiates Screen Negative  10/01/17 2039    THC Screen Negative  10/01/17 2039    Cocaine Screen       Propoxyphene Screen Negative  16    Buprenorphine Screen Negative  10/01/17 2039    Methamphetamine Screen       Oxycodone Screen Negative  10/01/17 2039    Tricyclic Antidepressants Screen                     Past OB History:     Obstetric History       T1      L1     SAB1   TAB0   Ectopic0   Molar0   Multiple0   Live Births1       # Outcome Date GA Lbr Victor M/2nd Weight Sex Delivery Anes PTL Lv   3 Current            2 Term 10/02/17 39w2d / 01:33 3269 g (7 lb 3.3 oz) F Vag-Spont EPI N GUNJAN      Name: GREGJENARO NEALBLAINE      Apgar1:  8                Apgar5: 9   1 SAB                   Past Medical History: Past Medical History:   Diagnosis Date   • Migraine     no meds prescribed   • Urinary tract infection       Past Surgical History Past Surgical History:   Procedure Laterality Date   • WISDOM TOOTH EXTRACTION      one was removed      Family History: Family History   Problem Relation Age of Onset   • Diabetes Paternal Grandfather    • Hypertension Paternal Grandfather    • Hypertension Paternal Grandmother    • Hypertension Maternal Grandmother    • Hypertension Maternal Grandfather       Social History:  reports that she has quit smoking. She has never used smokeless tobacco.   reports that she does not drink alcohol.   reports that she does not use drugs.        Review of Systems      Objective     Vital Signs Range for the last 24 hours  Temperature: Temp:  [98 °F (36.7 °C)] 98  °F (36.7 °C)   Temp Source: Temp src: Oral   BP: BP: (108)/(69) 108/69   Pulse: Heart Rate:  [103] 103   Respirations: Resp:  [20] 20   Weight: Weight:  [72.3 kg (159 lb 6.4 oz)] 72.3 kg (159 lb 6.4 oz)     Physical Examination: General appearance - alert, well appearing, and in no distress  Chest - clear to auscultation, no wheezes, rales or rhonchi, symmetric air entry  Heart - normal rate, regular rhythm, normal S1, S2, no murmurs, rubs, clicks or gallops  Abdomen - soft, nontender, nondistended, no masses or organomegaly  Pelvic -  1/50/-3 per nurse  Extremities - peripheral pulses normal, no pedal edema, no clubbing or cyanosis, no pedal edema noted    Presentation: vertex   Cervix: Exam by:     Dilation:  2   Effacement:  50   Station:  -3         Assessment/Plan       Pregnancy      Assessment & Plan    Assessment:  1.  Intrauterine pregnancy at 33w4d weeks gestation with reactive fetal status.    2.   labor  without ROM  3.  Obstetrical history significant for is non-contributory.  4.  GBS status: No results found for: GBSANTIGEN    Plan:  1. fetal and uterine monitoring  continuously, tocolysis with magnesium sulfate and IV antibiotics  2. Plan of care has been reviewed with patient and admits to understanding the above  3.  Risks, benefits of treatment plan have been discussed.  4.  All questions have been answered.        Marisela Thrasher DO  10/10/2018  5:55 PM

## 2018-10-10 NOTE — PLAN OF CARE
Problem: Patient Care Overview  Goal: Plan of Care Review   10/10/18 1835   Coping/Psychosocial   Plan of Care Reviewed With patient;significant other   Plan of Care Review   Progress no change     Goal: Individualization and Mutuality   10/10/18 1807   Mutuality/Individual Preferences   What Anxieties, Fears, Concerns, or Questions Do You Have About Your Care? prerterm delivery     Goal: Discharge Needs Assessment   10/10/18 1835   Discharge Needs Assessment   Readmission Within the Last 30 Days no previous admission in last 30 days   Concerns to be Addressed no discharge needs identified   Patient/Family Anticipates Transition to home   Patient/Family Anticipated Services at Transition none   Transportation Concerns car, none   Transportation Anticipated car, drives self;family or friend will provide   Anticipated Changes Related to Illness none   Equipment Needed After Discharge none   Offered/Gave Vendor List no   Disability   Equipment Currently Used at Home none       Problem:  Labor (Adult,Obstetrics,Pediatric)  Goal: Signs and Symptoms of Listed Potential Problems Will be Absent, Minimized or Managed ( Labor)   10/10/18 1835   Goal/Outcome Evaluation   Problems Assessed ( Labor) all   Problems Present ( Labor) none

## 2018-10-11 PROCEDURE — 25010000002 BETAMETHASONE ACET & SOD PHOS PER 4 MG: Performed by: OBSTETRICS & GYNECOLOGY

## 2018-10-11 PROCEDURE — 25010000002 MAGNESIUM SULFATE 40 GM/1000ML SOLUTION: Performed by: OBSTETRICS & GYNECOLOGY

## 2018-10-11 RX ORDER — NIFEDIPINE 10 MG/1
10 CAPSULE ORAL EVERY 6 HOURS
Status: DISCONTINUED | OUTPATIENT
Start: 2018-10-11 | End: 2018-10-12

## 2018-10-11 RX ORDER — HYDROXYZINE 50 MG/1
25 TABLET, FILM COATED ORAL 3 TIMES DAILY PRN
Status: DISCONTINUED | OUTPATIENT
Start: 2018-10-11 | End: 2018-10-14 | Stop reason: HOSPADM

## 2018-10-11 RX ORDER — ACETAMINOPHEN 325 MG/1
650 TABLET ORAL EVERY 4 HOURS PRN
Status: DISCONTINUED | OUTPATIENT
Start: 2018-10-11 | End: 2018-10-14 | Stop reason: HOSPADM

## 2018-10-11 RX ORDER — ACETAMINOPHEN 325 MG/1
TABLET ORAL
Status: COMPLETED
Start: 2018-10-11 | End: 2018-10-11

## 2018-10-11 RX ADMIN — AMPICILLIN SODIUM 1 G: 1 INJECTION, POWDER, FOR SOLUTION INTRAMUSCULAR; INTRAVENOUS at 13:36

## 2018-10-11 RX ADMIN — BETAMETHASONE SODIUM PHOSPHATE AND BETAMETHASONE ACETATE 12 MG: 3; 3 INJECTION, SUSPENSION INTRA-ARTICULAR; INTRALESIONAL; INTRAMUSCULAR at 02:55

## 2018-10-11 RX ADMIN — ACETAMINOPHEN 650 MG: 325 TABLET ORAL at 23:34

## 2018-10-11 RX ADMIN — NIFEDIPINE 10 MG: 10 CAPSULE ORAL at 16:59

## 2018-10-11 RX ADMIN — SODIUM CHLORIDE, POTASSIUM CHLORIDE, SODIUM LACTATE AND CALCIUM CHLORIDE 75 ML/HR: 600; 310; 30; 20 INJECTION, SOLUTION INTRAVENOUS at 11:56

## 2018-10-11 RX ADMIN — HYDROXYZINE HYDROCHLORIDE 25 MG: 50 TABLET ORAL at 11:28

## 2018-10-11 RX ADMIN — MAGNESIUM SULFATE IN WATER 2 G/HR: 40 INJECTION, SOLUTION INTRAVENOUS at 09:52

## 2018-10-11 RX ADMIN — ACETAMINOPHEN 650 MG: 325 TABLET ORAL at 16:11

## 2018-10-11 RX ADMIN — AMPICILLIN SODIUM 1 G: 1 INJECTION, POWDER, FOR SOLUTION INTRAMUSCULAR; INTRAVENOUS at 05:53

## 2018-10-11 RX ADMIN — AMPICILLIN SODIUM 1 G: 1 INJECTION, POWDER, FOR SOLUTION INTRAMUSCULAR; INTRAVENOUS at 02:55

## 2018-10-11 RX ADMIN — AMPICILLIN SODIUM 1 G: 1 INJECTION, POWDER, FOR SOLUTION INTRAMUSCULAR; INTRAVENOUS at 09:51

## 2018-10-11 RX ADMIN — NIFEDIPINE 10 MG: 10 CAPSULE ORAL at 23:34

## 2018-10-11 NOTE — PLAN OF CARE
Problem:  Labor (Adult,Obstetrics,Pediatric)  Goal: Signs and Symptoms of Listed Potential Problems Will be Absent, Minimized or Managed ( Labor)  Outcome: Ongoing (interventions implemented as appropriate)

## 2018-10-11 NOTE — PROGRESS NOTES
OB Progress Note      Hospital Course: G 3, now P 1011. Patient was admitted on 10/10/2018  3:03 PM with IUP at 33w5d weeks gestation secondary to Pregnancy [Z34.90]   labor [O60.00]. Contractions. Cervix: Dilated 3 cm    signs in last 24 hours:    Vital Signs Range for the last 24 hours              Temp:  [97.8 °F (36.6 °C)-98.5 °F (36.9 °C)] 97.8 °F (36.6 °C)  Heart Rate:  [] 99  Resp:  [18-20] 18  BP: ()/(46-78) 104/59     Radiology     Imaging Results (last 24 hours)     ** No results found for the last 24 hours. **           Labs     Lab Results (last 24 hours)     Procedure Component Value Units Date/Time    Group B Streptococcus Culture - Swab, Vaginal/Rectum [[id number]]  (Normal) Collected:  10/10/18 1754    Specimen:  Swab from Vaginal/Rectum Updated:  10/11/18 0724     Group B Strep Culture Culture in progress    CBC & Differential [[id number]] Collected:  10/10/18 1859    Specimen:  Blood Updated:  10/10/18 1949    Narrative:       The following orders were created for panel order CBC & Differential.  Procedure                               Abnormality         Status                     ---------                               -----------         ------                     CBC Auto Differential[[id number]]        Abnormal            Final result                 Please view results for these tests on the individual orders.    CBC Auto Differential [[id number]]  (Abnormal) Collected:  10/10/18 1859    Specimen:  Blood Updated:  10/10/18 1949     WBC 13.84 (H) 10*3/mm3      RBC 3.76 (L) 10*6/mm3      Hemoglobin 9.9 (L) g/dL      Hematocrit 31.4 (L) %      MCV 83.5 fL      MCH 26.3 (L) pg      MCHC 31.5 (L) g/dL      RDW 13.6 %      RDW-SD 41.5 fl      MPV 11.3 (H) fL      Platelets 219 10*3/mm3      Neutrophil % 91.1 (H) %      Lymphocyte % 7.5 (L) %      Monocyte % 1.1 %      Eosinophil % 0.0 %      Basophil % 0.1 %      Immature Grans % 0.2 %      Neutrophils, Absolute 12.61 (H) 10*3/mm3       Lymphocytes, Absolute 1.04 10*3/mm3      Monocytes, Absolute 0.15 10*3/mm3      Eosinophils, Absolute 0.00 10*3/mm3      Basophils, Absolute 0.01 10*3/mm3      Immature Grans, Absolute 0.03 10*3/mm3     Chlamydia trachomatis, Neisseria gonorrhoeae, PCR w/ confirmation - Swab, Vagina [[id number]] Resulted:  03/30/18     Specimen:  Swab from Vagina Updated:  10/10/18 1826     External Chlamydia Screen Negative    Gonorrhea Screen - Swab, [[id number]] Resulted:  03/30/18     Specimen:  Swab Updated:  10/10/18 1826     External Gonorrhea Screen Negative    Rubella Antibody, IgG [[id number]] Resulted:  03/30/18     Specimen:  Blood Updated:  10/10/18 1826     External Rubella Qual Immune    Group B Streptococcus Culture - Swab, Vaginal/Rectum [[id number]] Resulted:  03/30/18     Specimen:  Swab from Vaginal/Rectum Updated:  10/10/18 1826     External Strep Group B Ag Unknown    Urinalysis With Culture If Indicated - Urine, Clean Catch [[id number]]  (Abnormal) Collected:  10/10/18 1701    Specimen:  Urine from Urine, Clean Catch Updated:  10/10/18 1710     Color, UA Yellow     Appearance, UA Cloudy (A)     pH, UA 6.5     Specific Gravity, UA <=1.005     Glucose, UA Negative     Ketones, UA 40 mg/dL (2+) (A)     Bilirubin, UA Negative     Blood, UA Large (3+) (A)     Protein, UA Negative     Leuk Esterase, UA Large (3+) (A)     Nitrite, UA Negative     Urobilinogen, UA 0.2 E.U./dL    Urinalysis, Microscopic Only - Urine, Clean Catch [[id number]]  (Abnormal) Collected:  10/10/18 1701    Specimen:  Urine from Urine, Clean Catch Updated:  10/10/18 1710     RBC, UA 0-2 /HPF      WBC, UA Too Numerous to Count (A) /HPF      Bacteria, UA 2+ (A) /HPF      Squamous Epithelial Cells, UA 3-6 (A) /HPF      Hyaline Casts, UA None Seen /LPF      Methodology Automated Microscopy    Urine Culture - Urine, [[id number]] Collected:  10/10/18 1701    Specimen:  Urine from Urine, Clean Catch Updated:  10/10/18 1708            Review of Systems     The following systems were reviewed and negative;  ENT, respiratory, cardiovascular,  genitourinary, breast, endocrine and allergies / immunologic.      Physical Exam:      General Appearance:    Alert, cooperative, in no acute distress   Head:    Normocephalic, without obvious abnormality, atraumatic   Eyes:            Lids and lashes normal, conjunctivae and sclerae normal, no   icterus, no pallor, corneas clear, PERRLA   Ears:    Ears appear intact with no abnormalities noted   Throat:   No oral lesions, no thrush, oral mucosa moist   Neck:   No adenopathy, supple, trachea midline, no thyromegaly, no     carotid bruit, no JVD   Back:     No kyphosis present, no scoliosis present, no skin lesions,       erythema or scars, no tenderness to percussion or                   palpation,   range of motion normal   Lungs:     Clear to auscultation,respirations regular, even and                   unlabored    Heart:    Regular rhythm and normal rate, normal S1 and S2, no            murmur, no gallop, no rub, no click   Breast Exam:    Deferred   Abdomen:     Normal bowel sounds, no masses, no organomegaly, soft        non-tender, non-distended, no guarding, no rebound                 tenderness   Genitalia:    Cervix: Dilated 3 cm   Extremities:   Moves all extremities well, no edema, no cyanosis, no              redness   Pulses:   Pulses palpable and equal bilaterally   Skin:   No bleeding, bruising or rash   Lymph nodes:   No palpable adenopathy   Neurologic:   Cranial nerves 2 - 12 grossly intact, sensation intact, DTR        present and equal bilaterally                 Assessment:  1.  Patient doing well. Celestone x2. Will continue magnesium sulfate.    Plan:  1. Will continue current plan of care.      Faraz Morrow III, MD  10/11/18  9:29 AM

## 2018-10-11 NOTE — PAYOR COMM NOTE
"CONTACT:  CEFERINO ASHLEY RN, BSN  UTILIZATION MANAGEMENT DEPT.  [facility]  [address] [facility], [address]  PHONE:  [phone]  FAX: [phone]    REQUEST FOR INPATIENT AUTHORIZATION    ICD 10 CODE: O60.03  DR. ABI FERRELL NPI: [id number]  [facility] NPI: [id number]    (PER [employer] WEBSITE, TPL LISTS CIGNA AND ANTHEM BUT PER [employer] NO SUBSCRIBER FOUND FOR CIGNA AND ANTHEM HAS TERMED, LEAVING ANTHEM MEDICAID AS PRIMARY AND ONLY ACTIVE INSURANCE COVERAGE AT THIS TIME).    ConcordMatthias killianantha Paty (23 y.o. Female)     Date of Birth Social Security Number Address Home Phone MRN    1995  [address] [place] [phone] [id number]    [employer] Marital Status          None Single       Admission Date Admission Type Admitting Provider Attending Provider Department, Room/Bed    10/10/18 Elective Marisela Thrasher DO Gilbert, Travis Daniel, DO [facility] LABOR DELIVERY, L225/1    Discharge Date Discharge Disposition Discharge Destination                       Attending Provider:  Abi Ferrell DO    Allergies:  No Known Allergies    Isolation:  None   Infection:  None   Code Status:  CPR    Ht:  157.5 cm (62\")   Wt:  72.1 kg (159 lb)    Admission Cmt:  None   Principal Problem:  None                Active Insurance as of 10/10/2018     Primary Coverage     Payor Plan Insurance Group Employer/Plan Group    [employer]HEM MEDICAID [place] MEDICAID [id number]     Payor Plan Address Payor Plan Phone Number Effective From Effective To    [address] [address] [phone] 5/1/2015     [facility] [id number]       Subscriber Name Subscriber Birth Date Member ID       LISA BARNETT 1995 [id number]                 Emergency Contacts      (Rel.) Home Phone Work Phone Mobile Phone    Diego Barnett (Father) [phone] -- --               History & Physical      Marisela Thrasher DO at 10/10/2018  5:55 PM          [facility]  Obstetric " History and Physical    Chief Complaint   Patient presents with   • Non-stress Test     dilated 1 cm in office       Subjective     Patient is a 23 y.o. female  currently at 33w4d, who presents with  contractions and 1cm cervix sent from the office for observation. She denies LOF, VB.  Admits to good fetal movement.     Her prenatal care is benign.  Her previous obstetric/gynecological history is noted for is non-contributory.    The following portions of the patients history were reviewed and updated as appropriate: current medications, allergies, past medical history, past surgical history, past family history, past social history and problem list .       Prenatal Information:   Maternal Prenatal Labs  Blood Type No results found for: ABO   Rh Status No results found for: RH   Antibody Screen No results found for: ABSCRN   Rapid Urin Drug Screen No results found for: AMPMETHU, BARBITSCNUR, LABBENZSCN, LABMETHSCN, LABOPIASCN, THCURSCR, COCAINEUR, AMPHETSCREEN, PROPOXSCN, BUPRENORSCNU, METAMPSCNUR, OXYCODONESCN, TRICYCLICSCN   Group B Strep Culture No results found for: GBSANTIGEN           External Prenatal Results     Pregnancy Outside Results - Transcribed From Office Records - See Scanned Records For Details     Test Value Date Time    Hgb 12.9 g/dL 03/10/18 1732    Hct 38.9 % 03/10/18 1732    ABO A  10/01/17 1955    Rh Positive  10/01/17 1955    Antibody Screen Negative  10/01/17 1955    Glucose Fasting GTT       Glucose Tolerance Test 1 hour       Glucose Tolerance Test 3 hour       Gonorrhea (discrete) NEG  17     Chlamydia (discrete) NEG  17     RPR Non-Reactive  17     VDRL       Syphilis Antibody       Rubella Immune  17     HBsAg Negative  17     Herpes Simplex Virus PCR       Herpes Simplex VIrus Culture       HIV       Hep C RNA Quant PCR       Hep C Antibody       AFP       Group B Strep POS  17     GBS Susceptibility to Clindamycin       GBS  Susceptibility to Erythromycin       Fetal Fibronectin       Genetic Testing, Maternal Blood             Drug Screening     Test Value Date Time    Urine Drug Screen       Amphetamine Screen Negative  10/01/17 2039    Barbiturate Screen Negative  10/01/17 2039    Benzodiazepine Screen Negative  10/01/17 2039    Methadone Screen Negative  10/01/17 2039    Phencyclidine Screen Negative  10/01/17 2039    Opiates Screen Negative  10/01/17 2039    THC Screen Negative  10/01/17 2039    Cocaine Screen       Propoxyphene Screen Negative  16    Buprenorphine Screen Negative  10/01/17 2039    Methamphetamine Screen       Oxycodone Screen Negative  10/01/17 2039    Tricyclic Antidepressants Screen                     Past OB History:     Obstetric History       T1      L1     SAB1   TAB0   Ectopic0   Molar0   Multiple0   Live Births1       # Outcome Date GA Lbr Victor M/2nd Weight Sex Delivery Anes PTL Lv   3 Current            2 Term 10/02/17 39w2d / 01:33 3269 g (7 lb 3.3 oz) F Vag-Spont EPI N GUNJAN      Name: GREGJENARO NEALBLAINE      Apgar1:  8                Apgar5: 9   1 SAB                   Past Medical History: Past Medical History:   Diagnosis Date   • Migraine     no meds prescribed   • Urinary tract infection       Past Surgical History Past Surgical History:   Procedure Laterality Date   • WISDOM TOOTH EXTRACTION      one was removed      Family History: Family History   Problem Relation Age of Onset   • Diabetes Paternal Grandfather    • Hypertension Paternal Grandfather    • Hypertension Paternal Grandmother    • Hypertension Maternal Grandmother    • Hypertension Maternal Grandfather       Social History:  reports that she has quit smoking. She has never used smokeless tobacco.   reports that she does not drink alcohol.   reports that she does not use drugs.        Review of Systems      Objective     Vital Signs Range for the last 24 hours  Temperature: Temp:  [98 °F (36.7 °C)] 98 °F (36.7  °C)   Temp Source: Temp src: Oral   BP: BP: (108)/(69) 108/69   Pulse: Heart Rate:  [103] 103   Respirations: Resp:  [20] 20   Weight: Weight:  [72.3 kg (159 lb 6.4 oz)] 72.3 kg (159 lb 6.4 oz)     Physical Examination: General appearance - alert, well appearing, and in no distress  Chest - clear to auscultation, no wheezes, rales or rhonchi, symmetric air entry  Heart - normal rate, regular rhythm, normal S1, S2, no murmurs, rubs, clicks or gallops  Abdomen - soft, nontender, nondistended, no masses or organomegaly  Pelvic -  50/-3 per nurse  Extremities - peripheral pulses normal, no pedal edema, no clubbing or cyanosis, no pedal edema noted    Presentation: vertex   Cervix: Exam by:     Dilation:  2   Effacement:  50   Station:  -3         Assessment/Plan       Pregnancy      Assessment & Plan    Assessment:  1.  Intrauterine pregnancy at 33w4d weeks gestation with reactive fetal status.    2.   labor  without ROM  3.  Obstetrical history significant for is non-contributory.  4.  GBS status: No results found for: GBSANTIGEN    Plan:  1. fetal and uterine monitoring  continuously, tocolysis with magnesium sulfate and IV antibiotics  2. Plan of care has been reviewed with patient and admits to understanding the above  3.  Risks, benefits of treatment plan have been discussed.  4.  All questions have been answered.        Marisela Thrasher DO  10/10/2018  5:55 PM    Electronically signed by Marisela Thrasher DO at 10/10/2018  5:59 PM             ICU Vital Signs     Row Name 10/11/18 0939 10/11/18 0917 10/11/18 0916 10/11/18 0915 10/11/18 0847       Vitals    Pulse 111 111 109 106 103    BP  --  -- 108/64  -- 108/64       Oxygen Therapy    SpO2 99 % 100 %  -- 100 % 100 %    Row Name 10/11/18 0816 10/11/18 0815 10/11/18 0746 10/11/18 0718 10/11/18 0701       Vitals    Temp  --  --  -- 97.8 °F (36.6 °C)  --    Pulse 100 102 88 99 97    Resp  --  --  -- 18  --    BP 99/58  -- 90/55 104/59  --       Oxygen  Therapy    SpO2  -- 99 %  --  -- 99 %    Row Name 10/11/18 0605 10/11/18 0603 10/11/18 0601 10/11/18 0559 10/11/18 0557       Vitals    Pulse 101 103 101 91 98       Oxygen Therapy    SpO2 100 % 100 % 100 % 100 % 100 %    Row Name 10/11/18 0555 10/11/18 0553 10/11/18 0551 10/11/18 0549 10/11/18 0547       Vitals    Pulse 99 94 94 91 96       Oxygen Therapy    SpO2 100 % 100 % 100 % 100 % 99 %    Row Name 10/11/18 0546 10/11/18 0545 10/11/18 0543 10/11/18 0541 10/11/18 0539       Vitals    Pulse 90 103 102 96 87    BP (!)  89/54  --  --  --  --       Oxygen Therapy    SpO2  -- 99 % 99 % 100 % 99 %    Row Name 10/11/18 0537 10/11/18 0535 10/11/18 0533 10/11/18 0531 10/11/18 0529       Vitals    Pulse 94 91 90 88 90       Oxygen Therapy    SpO2 99 % 100 % 99 % 99 % 100 %    Row Name 10/11/18 0526 10/11/18 0525 10/11/18 0523 10/11/18 0521 10/11/18 0519       Vitals    Pulse 85 89 88 89 90       Oxygen Therapy    SpO2 100 % 100 % 100 % 100 % 100 %    Row Name 10/11/18 0517 10/11/18 0516 10/11/18 0515 10/11/18 0513 10/11/18 0510       Vitals    Pulse 86 88 92 86 99    BP  -- 92/55  --  --  --       Oxygen Therapy    SpO2 100 %  -- 100 % 100 % 100 %    Row Name 10/11/18 0509 10/11/18 0507 10/11/18 0505 10/11/18 0503 10/11/18 0501       Vitals    Pulse 103 101 97 97 100       Oxygen Therapy    SpO2 100 % 98 % 98 % 97 % 97 %    Row Name 10/11/18 0459 10/11/18 0457 10/11/18 0455 10/11/18 0453 10/11/18 0451       Vitals    Pulse 100 102 95 96 96       Oxygen Therapy    SpO2 99 % 98 % 98 % 98 % 99 %    Row Name 10/11/18 0449 10/11/18 0447 10/11/18 0446 10/11/18 0445 10/11/18 0443       Vitals    Pulse 97 97 96 99 100    BP  --  -- 101/56  --  --       Oxygen Therapy    SpO2 99 % 98 %  -- 99 % 100 %    Row Name 10/11/18 0441 10/11/18 0439 10/11/18 0437 10/11/18 0435 10/11/18 0433       Vitals    Pulse 96 95 97 102 94       Oxygen Therapy    SpO2 99 % 99 % 99 % 99 % 97 %    Row Name 10/11/18 0431 10/11/18 0429 10/11/18 0427  10/11/18 0425 10/11/18 0423       Vitals    Pulse 94 95 94 95 99       Oxygen Therapy    SpO2 98 % 99 % 100 % 100 % 100 %    Row Name 10/11/18 0421 10/11/18 0419 10/11/18 0417 10/11/18 0416 10/11/18 0415       Vitals    Pulse 97 96 98 97 92    BP  --  --  -- 105/59  --       Oxygen Therapy    SpO2 100 % 100 % 100 %  -- 100 %    Row Name 10/11/18 0413 10/11/18 0411 10/11/18 0409 10/11/18 0407 10/11/18 0405       Vitals    Pulse 94 98 99 92 104       Oxygen Therapy    SpO2 100 % 100 % 100 % 100 % 100 %    Row Name 10/11/18 0403 10/11/18 0401 10/11/18 0359 10/11/18 0357 10/11/18 0355       Vitals    Pulse 96 98 100 93 94       Oxygen Therapy    SpO2 100 % 100 % 100 % 99 % 99 %    Row Name 10/11/18 0353 10/11/18 0351 10/11/18 0349 10/11/18 0347 10/11/18 0346       Vitals    Pulse 94 101 89 96 90    BP  --  --  --  -- 92/51       Oxygen Therapy    SpO2 98 % 99 % 98 % 97 %  --    Row Name 10/11/18 0345 10/11/18 0343 10/11/18 0341 10/11/18 0339 10/11/18 0337       Vitals    Pulse 93 91 90 92 91       Oxygen Therapy    SpO2 98 % 99 % 98 % 98 % 98 %    Row Name 10/11/18 0335 10/11/18 0333 10/11/18 0331 10/11/18 0329 10/11/18 0327       Vitals    Pulse 95 95 90 96 89       Oxygen Therapy    SpO2 99 % 98 % 98 % 99 % 99 %    Row Name 10/11/18 0325 10/11/18 0323 10/11/18 0321 10/11/18 0319 10/11/18 0317       Vitals    Pulse 89 95 88 87 93       Oxygen Therapy    SpO2 99 % 99 % 99 % 100 % 100 %    Row Name 10/11/18 0316 10/11/18 0315 10/11/18 0313 10/11/18 0311 10/11/18 0309       Vitals    Pulse 87 87 85 90 89    BP (!)  88/46  --  --  --  --       Oxygen Therapy    SpO2  -- 100 % 100 % 99 % 100 %    Row Name 10/11/18 0307 10/11/18 0305 10/11/18 0303 10/11/18 0301 10/11/18 0259       Vitals    Pulse 87 98 87 98 90       Oxygen Therapy    SpO2 100 % 100 % 100 % 100 % 100 %    Row Name 10/11/18 0257 10/11/18 0256 10/11/18 0255 10/11/18 0253 10/11/18 0251       Vitals    Temp  -- 98.1 °F (36.7 °C)  --  --  --    Temp src  -- Oral   --  --  --    Pulse 83  -- 92 100 101       Oxygen Therapy    SpO2 100 %  -- 100 % 100 % 100 %    Row Name 10/11/18 0249 10/11/18 0247 10/11/18 0246 10/11/18 0245 10/11/18 0243       Vitals    Pulse 106 102 100 103 105    BP  --  -- 115/70  --  --       Oxygen Therapy    SpO2 100 % 99 %  -- 100 % 100 %    Row Name 10/11/18 0241 10/11/18 0239 10/11/18 0237 10/11/18 0235 10/11/18 0233       Vitals    Pulse 100 102 106 96 99       Oxygen Therapy    SpO2 100 % 100 % 100 % 100 % 100 %    Row Name 10/11/18 0231 10/11/18 0229 10/11/18 0227 10/11/18 0225 10/11/18 0223       Vitals    Pulse 101 104 103 104 104       Oxygen Therapy    SpO2 100 % 100 % 100 % 100 % 100 %    Row Name 10/11/18 0221 10/11/18 0219 10/11/18 0217 10/11/18 0216 10/11/18 0215       Vitals    Pulse 104 106 107 100 100    BP  --  --  -- 110/68  --       Oxygen Therapy    SpO2 100 % 100 % 100 %  -- 100 %    Row Name 10/11/18 0213 10/11/18 0211 10/11/18 0209 10/11/18 0207 10/11/18 0205       Vitals    Pulse 101 103 105 102 97       Oxygen Therapy    SpO2 99 % 100 % 100 % 99 % 100 %    Row Name 10/11/18 0203 10/11/18 0201 10/11/18 0159 10/11/18 0157 10/11/18 0155       Vitals    Pulse 100 101 108 100 101       Oxygen Therapy    SpO2 100 % 100 % 100 % 100 % 99 %    Row Name 10/11/18 0153 10/11/18 0151 10/11/18 0149 10/11/18 0148 10/11/18 0147       Vitals    Pulse 106 104 111 102 98    BP  --  --  -- 117/66  --       Oxygen Therapy    SpO2 100 % 100 % 100 %  -- 100 %    Row Name 10/11/18 0145 10/11/18 0143 10/11/18 0141 10/11/18 0139 10/11/18 0137       Vitals    Pulse 101 104 104 99 96       Oxygen Therapy    SpO2 100 % 100 % 99 % 100 % 99 %    Row Name 10/11/18 0135 10/11/18 0133 10/11/18 0131 10/11/18 0129 10/11/18 0127       Vitals    Pulse 100 97 104 100 102       Oxygen Therapy    SpO2 99 % 99 % 99 % 99 % 100 %    Row Name 10/11/18 0125 10/11/18 0123 10/11/18 0121 10/11/18 0119 10/11/18 0117       Vitals    Pulse 106 110 109 99 98    BP  --  --  --  --  99/53       Oxygen Therapy    SpO2 99 % 99 % 99 % 99 % 99 %    Row Name 10/10/18 2335 10/10/18 2333 10/10/18 2331 10/10/18 2329 10/10/18 2327       Vitals    Pulse 104 101 100 100 103       Oxygen Therapy    SpO2 99 % 99 % 99 % 99 % 99 %    Row Name 10/10/18 2325 10/10/18 2323 10/10/18 2321 10/10/18 2319 10/10/18 2317       Vitals    Pulse 102 98 103 100 98       Oxygen Therapy    SpO2 99 % 99 % 99 % 99 % 99 %    Row Name 10/10/18 2316 10/10/18 2315 10/10/18 2313 10/10/18 2311 10/10/18 2309       Vitals    Pulse 99 105 101 99 99    /53  --  --  --  --       Oxygen Therapy    SpO2  -- 99 % 99 % 99 % 99 %    Row Name 10/10/18 2307 10/10/18 2305 10/10/18 2303 10/10/18 2301 10/10/18 2259       Vitals    Pulse 99 100 105 102 106       Oxygen Therapy    SpO2 99 % 99 % 100 % 100 % 100 %    Row Name 10/10/18 2257 10/10/18 2255 10/10/18 2253 10/10/18 2251 10/10/18 2249       Vitals    Pulse 107 110 106 106 100       Oxygen Therapy    SpO2 100 % 100 % 100 % 100 % 98 %    Row Name 10/10/18 2247 10/10/18 2246 10/10/18 2245 10/10/18 2243 10/10/18 2241       Vitals    Pulse 97 97 94 100 98    BP  -- (!)  86/50  --  --  --       Oxygen Therapy    SpO2 98 %  -- 98 % 99 % 99 %    Row Name 10/10/18 2239 10/10/18 2237 10/10/18 2235 10/10/18 2233 10/10/18 2231       Vitals    Pulse 101 100 98 102 103       Oxygen Therapy    SpO2 100 % 99 % 99 % 99 % 99 %    Row Name 10/10/18 2229 10/10/18 2227 10/10/18 2225 10/10/18 2223 10/10/18 2221       Vitals    Pulse 98 101 104 97 98       Oxygen Therapy    SpO2 99 % 99 % 99 % 98 % 99 %    Row Name 10/10/18 2219 10/10/18 2217 10/10/18 2216 10/10/18 2215 10/10/18 2213       Vitals    Pulse 94 101 100 100 98    BP  --  -- 91/53  --  --       Oxygen Therapy    SpO2 98 % 99 %  -- 99 % 99 %    Row Name 10/10/18 2211 10/10/18 2209 10/10/18 2207 10/10/18 2205 10/10/18 2203       Vitals    Pulse 98 100 96 95 94       Oxygen Therapy    SpO2 98 % 98 % 98 % 98 % 98 %    Row Name 10/10/18 2201 10/10/18  2159 10/10/18 2157 10/10/18 2155 10/10/18 2153       Vitals    Pulse 96 97 99 102 100       Oxygen Therapy    SpO2 98 % 98 % 97 % 97 % 98 %    Row Name 10/10/18 2151 10/10/18 2149 10/10/18 2147 10/10/18 2146 10/10/18 2145       Vitals    Pulse 99 99 104 98 99    BP  --  --  -- 96/54  --       Oxygen Therapy    SpO2 98 % 98 % 98 %  -- 98 %    Row Name 10/10/18 2143 10/10/18 2141 10/10/18 2139 10/10/18 2136 10/10/18 2135       Vitals    Pulse 96 100 105 104 101       Oxygen Therapy    SpO2 98 % 98 % 98 % 99 % 99 %    Row Name 10/10/18 2133 10/10/18 2131 10/10/18 2129 10/10/18 2127 10/10/18 2125       Vitals    Pulse 100 106 107 111 107       Oxygen Therapy    SpO2 99 % 100 % 99 % 100 % 99 %    Row Name 10/10/18 2123 10/10/18 2121 10/10/18 2119 10/10/18 2117 10/10/18 2116       Vitals    Pulse 101 110 104 105 100    BP  --  --  --  -- 100/55       Oxygen Therapy    SpO2 98 % 98 % 98 % 98 %  --    Row Name 10/10/18 2115 10/10/18 2113 10/10/18 2111 10/10/18 2109 10/10/18 2107       Vitals    Pulse 103 102 106 104 106       Oxygen Therapy    SpO2 98 % 98 % 98 % 98 % 98 %    Row Name 10/10/18 2105 10/10/18 2103 10/10/18 2101 10/10/18 2100 10/10/18 1916       Vitals    Temp  --  --  --  -- 98.5 °F (36.9 °C)    Temp src  --  --  --  -- Oral    Pulse 105 112 107  -- 111    Heart Rate Source  --  --  --  -- Monitor    Resp  --  --  --  -- 18    Resp Rate Source  --  --  --  -- Visual    BP  --  --  --  -- 118/64    BP Location  --  --  --  -- Right arm    BP Method  --  --  --  -- Automatic    Patient Position  --  --  --  -- Lying       Oxygen Therapy    SpO2 98 % 99 % 100 %  --  --       Patient Observation    Observations  --  --  -- Pt. turned to her right side at this time.   --    Row Name 10/10/18 1816 10/10/18 1809 10/10/18 1807 10/10/18 1804 10/10/18 1801       Height and Weight    Weight  --  -- 72.1 kg (159 lb)  --  --       Vitals    Pulse 120 115  -- 117 (!)  130    /58 118/59  -- 123/57 122/60    Row Name  "10/10/18 1754 10/10/18 1751 10/10/18 1745 10/10/18 1734 10/10/18 1513       Vitals    Pulse (!)  122 (!)  126 (!)  122 115 103    /59 119/56 139/78 120/57 108/69    Row Name 10/10/18 1505                   Height and Weight    Height 157.5 cm (62\")        Height Method Stated        Weight 72.3 kg (159 lb 6.4 oz)        Weight Method Standing scale        Ideal Body Weight (IBW) (kg) 50.43        BSA (Calculated - sq m) 1.74 sq meters        BMI (Calculated) 29.1        Weight in (lb) to have BMI = 25 136.4           Vitals    Temp 98 °F (36.7 °C)        Temp src Oral        Pulse 103        Heart Rate Source Monitor        Resp 20        Resp Rate Source Visual        /69        BP Location Left arm        BP Method Automatic        Patient Position Lying           Oxygen Therapy    SpO2 99 %            Intake & Output (last day)       10/10 0701 - 10/11 0700 10/11 0701 - 10/12 0700    Urine (mL/kg/hr) 2000 300 (1.3)    Total Output 2000 300    Net -2000 -300              Lines, Drains & Airways    Active LDAs     Name:   Placement date:   Placement time:   Site:   Days:    Peripheral IV 10/10/18 1655 Left Hand  10/10/18    1655    Hand    less than 1         Inactive LDAs     Name:   Placement date:   Placement time:   Removal date:   Removal time:   Site:   Days:    [REMOVED] Urethral Catheter Silicone 16 Fr.  10/10/18    1750    10/11/18    0600      less than 1                Hospital Medications (all)       Dose Frequency Start End    ampicillin 1 g/100 mL 0.9% NS (MBP) 1 g Every 4 Hours 10/11/2018 10/13/2018    Sig - Route: Infuse 100 mL into a venous catheter Every 4 (Four) Hours. - Intravenous    ampicillin 2 g/100 mL 0.9% NS (MBP) 2 g Once 10/10/2018 10/10/2018    Sig - Route: Infuse 100 mL into a venous catheter 1 (One) Time. - Intravenous    lactated ringers bolus 1,000 mL 1,000 mL Once 10/10/2018 10/10/2018    Sig - Route: Infuse 1,000 mL into a venous catheter 1 (One) Time. - Intravenous    " lactated ringers infusion 75 mL/hr Continuous 10/10/2018     Sig - Route: Infuse 75 mL/hr into a venous catheter Continuous. - Intravenous    lidocaine (XYLOCAINE) 2 % jelly  As Needed 10/10/2018     Sig - Route: Insert  into the urethra As Needed for Mild Pain . - Urethral    magnesium sulfate 40 GM/1000ML infusion 2 g/hr Continuous 10/10/2018 10/13/2018    Sig - Route: Infuse 2 g/hr into a venous catheter Continuous. - Intravenous    magnesium sulfate bolus from bag 0.04 g/mL 6 g 6 g Once 10/10/2018 10/17/2018    Sig - Route: Infuse 150 mL into a venous catheter 1 (One) Time. - Intravenous    ondansetron (ZOFRAN) injection 4 mg 4 mg Every 6 Hours PRN 10/10/2018     Sig - Route: Infuse 2 mL into a venous catheter Every 6 (Six) Hours As Needed for Nausea or Vomiting. - Intravenous    prenatal vitamin 27-0.8 tablet 1 tablet 1 tablet Nightly 10/11/2018     Sig - Route: Take 1 tablet by mouth Every Night. - Oral    promethazine (PHENERGAN) injection 12.5 mg 12.5 mg Every 6 Hours PRN 10/10/2018     Sig - Route: Infuse 0.5 mL into a venous catheter Every 6 (Six) Hours As Needed for Nausea or Vomiting. - Intravenous    terbutaline (BRETHINE) injection 0.25 mg 0.25 mg Once 10/10/2018 10/10/2018    Sig - Route: Inject 0.25 mL under the skin into the appropriate area as directed 1 (One) Time. - Subcutaneous    terbutaline (BRETHINE) injection 0.25 mg 0.25 mg Once 10/10/2018 10/10/2018    Sig - Route: Inject 0.25 mL under the skin into the appropriate area as directed 1 (One) Time. - Subcutaneous    ampicillin 1 g/100 mL 0.9% NS (MBP) (Discontinued) 1 g Every 4 Hours 10/10/2018 10/11/2018    Sig - Route: Infuse 100 mL into a venous catheter Every 4 (Four) Hours. - Intravenous    betamethasone acetate-betamethasone sodium phosphate (CELESTONE SOLUSPAN) injection 12 mg (Discontinued) 12 mg Every 24 Hours 10/10/2018 10/10/2018    Sig - Route: Inject 2 mL into the appropriate muscle as directed by prescriber Daily. -  Intramuscular    betamethasone acetate-betamethasone sodium phosphate (CELESTONE SOLUSPAN) injection 12 mg (Discontinued) 12 mg Every 12 Hours 10/11/2018 10/11/2018    Sig - Route: Inject 2 mL into the appropriate muscle as directed by prescriber Every 12 (Twelve) Hours. - Intramuscular    prenatal vitamin 27-0.8 tablet 1 tablet (Discontinued) 1 tablet Nightly 10/11/2018 10/10/2018    Sig - Route: Take 1 tablet by mouth Every Night. - Oral          Lab Results (all)     Procedure Component Value Units Date/Time    Group B Streptococcus Culture - Swab, Vaginal/Rectum [[id number]]  (Normal) Collected:  10/10/18 1754    Specimen:  Swab from Vaginal/Rectum Updated:  10/11/18 0724     Group B Strep Culture Culture in progress    CBC & Differential [[id number]] Collected:  10/10/18 1859    Specimen:  Blood Updated:  10/10/18 1949    Narrative:       The following orders were created for panel order CBC & Differential.  Procedure                               Abnormality         Status                     ---------                               -----------         ------                     CBC Auto Differential[[id number]]        Abnormal            Final result                 Please view results for these tests on the individual orders.    CBC Auto Differential [[id number]]  (Abnormal) Collected:  10/10/18 1859    Specimen:  Blood Updated:  10/10/18 1949     WBC 13.84 (H) 10*3/mm3      RBC 3.76 (L) 10*6/mm3      Hemoglobin 9.9 (L) g/dL      Hematocrit 31.4 (L) %      MCV 83.5 fL      MCH 26.3 (L) pg      MCHC 31.5 (L) g/dL      RDW 13.6 %      RDW-SD 41.5 fl      MPV 11.3 (H) fL      Platelets 219 10*3/mm3      Neutrophil % 91.1 (H) %      Lymphocyte % 7.5 (L) %      Monocyte % 1.1 %      Eosinophil % 0.0 %      Basophil % 0.1 %      Immature Grans % 0.2 %      Neutrophils, Absolute 12.61 (H) 10*3/mm3      Lymphocytes, Absolute 1.04 10*3/mm3      Monocytes, Absolute 0.15 10*3/mm3      Eosinophils, Absolute 0.00 10*3/mm3       Basophils, Absolute 0.01 10*3/mm3      Immature Grans, Absolute 0.03 10*3/mm3     Chlamydia trachomatis, Neisseria gonorrhoeae, PCR w/ confirmation - Swab, Vagina [[id number]] Resulted:  03/30/18     Specimen:  Swab from Vagina Updated:  10/10/18 1826     External Chlamydia Screen Negative    Gonorrhea Screen - Swab, [[id number]] Resulted:  03/30/18     Specimen:  Swab Updated:  10/10/18 1826     External Gonorrhea Screen Negative    Rubella Antibody, IgG [[id number]] Resulted:  03/30/18     Specimen:  Blood Updated:  10/10/18 1826     External Rubella Qual Immune    Group B Streptococcus Culture - Swab, Vaginal/Rectum [[id number]] Resulted:  03/30/18     Specimen:  Swab from Vaginal/Rectum Updated:  10/10/18 1826     External Strep Group B Ag Unknown    Urinalysis With Culture If Indicated - Urine, Clean Catch [[id number]]  (Abnormal) Collected:  10/10/18 1701    Specimen:  Urine from Urine, Clean Catch Updated:  10/10/18 1710     Color, UA Yellow     Appearance, UA Cloudy (A)     pH, UA 6.5     Specific Gravity, UA <=1.005     Glucose, UA Negative     Ketones, UA 40 mg/dL (2+) (A)     Bilirubin, UA Negative     Blood, UA Large (3+) (A)     Protein, UA Negative     Leuk Esterase, UA Large (3+) (A)     Nitrite, UA Negative     Urobilinogen, UA 0.2 E.U./dL    Urinalysis, Microscopic Only - Urine, Clean Catch [[id number]]  (Abnormal) Collected:  10/10/18 1701    Specimen:  Urine from Urine, Clean Catch Updated:  10/10/18 1710     RBC, UA 0-2 /HPF      WBC, UA Too Numerous to Count (A) /HPF      Bacteria, UA 2+ (A) /HPF      Squamous Epithelial Cells, UA 3-6 (A) /HPF      Hyaline Casts, UA None Seen /LPF      Methodology Automated Microscopy    Urine Culture - Urine, [[id number]] Collected:  10/10/18 1701    Specimen:  Urine from Urine, Clean Catch Updated:  10/10/18 1708          Imaging Results (all)     None        Orders (all)     Start     Ordered    10/11/18 2100  prenatal vitamin 27-0.8 tablet 1 tablet  Nightly       10/10/18 2313    10/11/18 1000  ampicillin 1 g/100 mL 0.9% NS (MBP)  Every 4 Hours      10/11/18 0802    10/11/18 0608  Diet Regular  Diet Effective Now      10/11/18 0607    10/11/18 0340  betamethasone acetate-betamethasone sodium phosphate (CELESTONE SOLUSPAN) injection 12 mg  Every 12 Hours,   Status:  Discontinued      10/10/18 1834    10/11/18 0000  prenatal vitamin 27-0.8 tablet 1 tablet  Nightly,   Status:  Discontinued      10/10/18 2312    10/10/18 2300  ampicillin 1 g/100 mL 0.9% NS (MBP)  Every 4 Hours,   Status:  Discontinued      10/10/18 1755    10/10/18 2200  Magnesium  Once      10/10/18 1822    10/10/18 2102  lidocaine (XYLOCAINE) 2 % jelly  As Needed      10/10/18 2103    10/10/18 2000  Vital Signs Per hospital policy  Every 4 Hours     Comments:  Daily Weight    10/10/18 1745    10/10/18 2000  Measure Blood Pressure  Every 4 Hours     Comments:  Notify MD if > 140/90 x 2.    10/10/18 1745    10/10/18 1900  ampicillin 2 g/100 mL 0.9% NS (MBP)  Once      10/10/18 1755    10/10/18 1845  magnesium sulfate bolus from bag 0.04 g/mL 6 g  Once      10/10/18 1745    10/10/18 1845  magnesium sulfate 40 GM/1000ML infusion  Continuous      10/10/18 1745    10/10/18 1845  lactated ringers infusion  Continuous      10/10/18 1745    10/10/18 1835  CBC & Differential  Once      10/10/18 1835    10/10/18 1835  Type & Screen  STAT      10/10/18 1835    10/10/18 1835  CBC Auto Differential  PROCEDURE ONCE      10/10/18 1835    10/10/18 1815  terbutaline (BRETHINE) injection 0.25 mg  Once      10/10/18 1728    10/10/18 1757  Admit To Obstetrics Inpatient  Once      10/10/18 1757    10/10/18 1756  ondansetron (ZOFRAN) injection 4 mg  Every 6 Hours PRN      10/10/18 1756    10/10/18 1754  Group B Streptococcus Culture - Swab, Vaginal/Rectum  Once,   Status:  Canceled      10/10/18 1755    10/10/18 1751  Group B Streptococcus Culture - Swab, Vaginal/Rectum  Once      10/10/18 1751    10/10/18 1745  Insert Indwelling  Urinary Catheter  Once      10/10/18 1745    10/10/18 1745  Assess Need for Indwelling Urinary Catheter - Follow Removal Protocol  Continuous     Comments:  Indwelling Urinary Catheter Removal Criteria  Discontinue Indwelling Urinary Catheter Unless One of the Following is Present  Urinary Retention or Obstruction  Chronic Epps Catheter Use  End of Life  Critical Illness with Strict I/O   Tract or Abdominal Surgery  Stage 3/4 Sacral / Perineal Wound  Required Activity Restriction: Trauma  Required Activity Restriction: Spine Surgery  If Patient is Being Followed by Urology Contact Them PRIOR to Removal  Do Not Remove Indwelling Urinary Catheter Order is Present with a CLINICAL REASON to Maintain the Catheter. Provider is Required to Include a Clinical Reason to Maintain a Urinary Catheter    Chronic Epps Catheter Use (Present on Admission)  Assess for Continued Need & Document Medical Necessity  If Infection is Suspected, Contact the Provider        10/10/18 1745    10/10/18 1745  Catheter Care  Every Shift      10/10/18 1745    10/10/18 1743  promethazine (PHENERGAN) injection 12.5 mg  Every 6 Hours PRN      10/10/18 1745    10/10/18 1743  External uterine contraction monitoring  Per Hospital Policy      10/10/18 1745    10/10/18 1743  Fetal contraction stress test  Once      10/10/18 1745    10/10/18 1743  Vital Signs  Every 15 Minutes     Comments:  Every 15 minutes for 1 hour, then every hour.    10/10/18 1745    10/10/18 1743  Continuous Pulse Oximetry  Continuous      10/10/18 1745    10/10/18 1743  Check Clonus (DTRs) With Vitals  Per Hospital Policy      10/10/18 1745    10/10/18 1743  Assess LOC With Vitals  Per Hospital Policy      10/10/18 1745    10/10/18 1743  Strict Intake & Output  Every Shift      10/10/18 1745    10/10/18 1743  Total IV Fluids Should Equal 125 mL/hour  Continuous      10/10/18 1745    10/10/18 1742  Bed Rest with Bathroom Privileges  Once      10/10/18 1745    10/10/18 1742   Fetal nonstress test  Once      10/10/18 1745    10/10/18 1742  Notify Physician (specified)  Until Discontinued      10/10/18 1745    10/10/18 1742  Notify physician for hyperstimulus (per hospital algorithm)  Until Discontinued      10/10/18 1745    10/10/18 1742  Notify physician if membranes ruptured, bleeding greater than 1 pad an hour, fetal heart tone abnormality, and severe pain  Until Discontinued      10/10/18 1745    10/10/18 1742  Notify physician if membranes ruptured, bleeding greater than 1 pad an hour, fetal heart tone abnormality, and severe pain  Until Discontinued      10/10/18 1745    10/10/18 1742  Advance Diet as Tolerated  Until Discontinued      10/10/18 1745    10/10/18 1742  Place Sequential Compression Device  Once      10/10/18 1745    10/10/18 1742  Maintain Sequential Compression Device  Continuous      10/10/18 1745    10/10/18 1730  lactated ringers bolus 1,000 mL  Once      10/10/18 1634    10/10/18 1730  terbutaline (BRETHINE) injection 0.25 mg  Once      10/10/18 1634    10/10/18 1709  Urinalysis, Microscopic Only - Urine, Clean Catch  Once      10/10/18 1708    10/10/18 1709  Urine Culture - Urine,  Once      10/10/18 1708    10/10/18 1635  NPO Diet NPO Except: Ice chips  Diet Effective Now,   Status:  Canceled      10/10/18 1634    10/10/18 1634  Urinalysis With Culture If Indicated - Urine, Clean Catch  Once      10/10/18 1634    10/10/18 1633  Vital Signs Per Hospital Policy  Per Hospital Policy      10/10/18 1634    10/10/18 1633  Notify Physician (specified)  Until Discontinued      10/10/18 1634    10/10/18 1633  Notify physician for tachysystole (per hospital algorithm)  Until Discontinued      10/10/18 1634    10/10/18 1633  Notify physician if membranes ruptured, bleeding greater than 1 pad an hour, fetal heart tone abnormality, and severe pain  Until Discontinued      10/10/18 1634    10/10/18 1633  Insert Peripheral IV  Once      10/10/18 1634    10/10/18 1633  Saline  Lock & Maintain IV Access  Continuous      10/10/18 1634    10/10/18 1630  betamethasone acetate-betamethasone sodium phosphate (CELESTONE SOLUSPAN) injection 12 mg  Every 24 Hours,   Status:  Discontinued      10/10/18 1531    10/10/18 1627  Code Status and Medical Interventions:  Continuous      10/10/18 1626    10/10/18 1531  Outpatient In A Bed  Once      10/10/18 1531    10/10/18 1531  Fetal Nonstress Test  Once     Comments:  No chief complaint on file.      10/10/18 1531    10/10/18 0000  Group B Streptococcus Culture - Swab, Vaginal/Rectum     Comments:  This is an external result entered through the Results Console.      10/10/18 1826    10/10/18 0000  Chlamydia trachomatis, Neisseria gonorrhoeae, PCR w/ confirmation - Swab, Vagina     Comments:  This is an external result entered through the Results Console.      10/10/18 1826    10/10/18 0000  Gonorrhea Screen - Swab,     Comments:  This is an external result entered through the Results Console.      10/10/18 1826    10/10/18 0000  Rubella Antibody, IgG     Comments:  This is an external result entered through the Results Console.      10/10/18 1826             Physician Progress Notes (all)      Faraz Morrow III, MD at 10/11/2018  9:29 AM          OB Progress Note      Hospital Course: G 3, now P 1011. Patient was admitted on 10/10/2018  3:03 PM with IUP at 33w5d weeks gestation secondary to Pregnancy [Z34.90]   labor [O60.00]. Contractions. Cervix: Dilated 3 cm    signs in last 24 hours:    Vital Signs Range for the last 24 hours              Temp:  [97.8 °F (36.6 °C)-98.5 °F (36.9 °C)] 97.8 °F (36.6 °C)  Heart Rate:  [] 99  Resp:  [18-20] 18  BP: ()/(46-78) 104/59     Radiology     Imaging Results (last 24 hours)     ** No results found for the last 24 hours. **           Labs     Lab Results (last 24 hours)     Procedure Component Value Units Date/Time    Group B Streptococcus Culture - Swab, Vaginal/Rectum [[id number]]  (Normal)  Collected:  10/10/18 1754    Specimen:  Swab from Vaginal/Rectum Updated:  10/11/18 0724     Group B Strep Culture Culture in progress    CBC & Differential [[id number]] Collected:  10/10/18 1859    Specimen:  Blood Updated:  10/10/18 1949    Narrative:       The following orders were created for panel order CBC & Differential.  Procedure                               Abnormality         Status                     ---------                               -----------         ------                     CBC Auto Differential[[id number]]        Abnormal            Final result                 Please view results for these tests on the individual orders.    CBC Auto Differential [[id number]]  (Abnormal) Collected:  10/10/18 1859    Specimen:  Blood Updated:  10/10/18 1949     WBC 13.84 (H) 10*3/mm3      RBC 3.76 (L) 10*6/mm3      Hemoglobin 9.9 (L) g/dL      Hematocrit 31.4 (L) %      MCV 83.5 fL      MCH 26.3 (L) pg      MCHC 31.5 (L) g/dL      RDW 13.6 %      RDW-SD 41.5 fl      MPV 11.3 (H) fL      Platelets 219 10*3/mm3      Neutrophil % 91.1 (H) %      Lymphocyte % 7.5 (L) %      Monocyte % 1.1 %      Eosinophil % 0.0 %      Basophil % 0.1 %      Immature Grans % 0.2 %      Neutrophils, Absolute 12.61 (H) 10*3/mm3      Lymphocytes, Absolute 1.04 10*3/mm3      Monocytes, Absolute 0.15 10*3/mm3      Eosinophils, Absolute 0.00 10*3/mm3      Basophils, Absolute 0.01 10*3/mm3      Immature Grans, Absolute 0.03 10*3/mm3     Chlamydia trachomatis, Neisseria gonorrhoeae, PCR w/ confirmation - Swab, Vagina [[id number]] Resulted:  03/30/18     Specimen:  Swab from Vagina Updated:  10/10/18 1826     External Chlamydia Screen Negative    Gonorrhea Screen - Swab, [[id number]] Resulted:  03/30/18     Specimen:  Swab Updated:  10/10/18 1826     External Gonorrhea Screen Negative    Rubella Antibody, IgG [[id number]] Resulted:  03/30/18     Specimen:  Blood Updated:  10/10/18 1826     External Rubella Qual Immune    Group B Streptococcus  Culture - Swab, Vaginal/Rectum [[id number]] Resulted:  03/30/18     Specimen:  Swab from Vaginal/Rectum Updated:  10/10/18 1826     External Strep Group B Ag Unknown    Urinalysis With Culture If Indicated - Urine, Clean Catch [[id number]]  (Abnormal) Collected:  10/10/18 1701    Specimen:  Urine from Urine, Clean Catch Updated:  10/10/18 1710     Color, UA Yellow     Appearance, UA Cloudy (A)     pH, UA 6.5     Specific Gravity, UA <=1.005     Glucose, UA Negative     Ketones, UA 40 mg/dL (2+) (A)     Bilirubin, UA Negative     Blood, UA Large (3+) (A)     Protein, UA Negative     Leuk Esterase, UA Large (3+) (A)     Nitrite, UA Negative     Urobilinogen, UA 0.2 E.U./dL    Urinalysis, Microscopic Only - Urine, Clean Catch [[id number]]  (Abnormal) Collected:  10/10/18 1701    Specimen:  Urine from Urine, Clean Catch Updated:  10/10/18 1710     RBC, UA 0-2 /HPF      WBC, UA Too Numerous to Count (A) /HPF      Bacteria, UA 2+ (A) /HPF      Squamous Epithelial Cells, UA 3-6 (A) /HPF      Hyaline Casts, UA None Seen /LPF      Methodology Automated Microscopy    Urine Culture - Urine, [[id number]] Collected:  10/10/18 1701    Specimen:  Urine from Urine, Clean Catch Updated:  10/10/18 1708            Review of Systems    The following systems were reviewed and negative;  ENT, respiratory, cardiovascular,  genitourinary, breast, endocrine and allergies / immunologic.      Physical Exam:      General Appearance:    Alert, cooperative, in no acute distress   Head:    Normocephalic, without obvious abnormality, atraumatic   Eyes:            Lids and lashes normal, conjunctivae and sclerae normal, no   icterus, no pallor, corneas clear, PERRLA   Ears:    Ears appear intact with no abnormalities noted   Throat:   No oral lesions, no thrush, oral mucosa moist   Neck:   No adenopathy, supple, trachea midline, no thyromegaly, no     carotid bruit, no JVD   Back:     No kyphosis present, no scoliosis present, no skin lesions,        erythema or scars, no tenderness to percussion or                   palpation,   range of motion normal   Lungs:     Clear to auscultation,respirations regular, even and                   unlabored    Heart:    Regular rhythm and normal rate, normal S1 and S2, no            murmur, no gallop, no rub, no click   Breast Exam:    Deferred   Abdomen:     Normal bowel sounds, no masses, no organomegaly, soft        non-tender, non-distended, no guarding, no rebound                 tenderness   Genitalia:    Cervix: Dilated 3 cm   Extremities:   Moves all extremities well, no edema, no cyanosis, no              redness   Pulses:   Pulses palpable and equal bilaterally   Skin:   No bleeding, bruising or rash   Lymph nodes:   No palpable adenopathy   Neurologic:   Cranial nerves 2 - 12 grossly intact, sensation intact, DTR        present and equal bilaterally         Assessment/Plan        Assessment:  1.  Patient doing well. Celestone x2. Will continue magnesium sulfate.    Plan:  1. Will continue current plan of care.      Faraz Morrow III, MD  10/11/18  9:29 AM      Electronically signed by Faraz Morrow III, MD at 10/11/2018  9:31 AM       Fetal Testing   Row Name    10/11/18  0629    10/11/18  0600    10/11/18  0530    10/11/18  0500    10/11/18  0430   Nonstress Test   Reason for NST             Acoustic Stimulator             LEVI (If Indicated) (cm)             Uterine Irritability             Contractions             Contraction Frequency (Minutes)             Fetal HR Variability             Fetal HR Accelerations             Fetal HR Decelerations             Fetal HR (Beats/Min)             Contraction Frequency (min)             Fetal Assessment   Fetal Movement  active  active  active  active  active   Fetal HR Assessment Method  external  external  external  external  external   Fetal HR (beats/min)  145  125  135  135  135   Fetal Heart Baseline Rate  normal range  normal range  normal range  normal range   normal range   Fetal HR Variability  moderate (ampl-  itude range 6  to 25 bpm)  moderate (ampl-  itude range 6  to 25 bpm)  moderate (ampl-  itude range 6  to 25 bpm)  moderate (ampl-  itude range 6  to 25 bpm)  moderate (ampl-  itude range 6  to 25 bpm)   Fetal HR Accelerations  absent  absent  absent  absent  greater than/  equal to 15  bpm;lasting at  least 15 secon-  ds;prolonged   Fetal HR Decelerations  variable  variable  variable  variable  variable   Fetal HR Tracing Category          Category I   Sinusoidal Pattern Present  absent  absent  absent  absent  absent   Fetal Presentation             Additional Documentation             Interpretation A   Nonstress Test Interpretation A             Comments A             Nonstress Test Interpretation A             Overall Impression A             Biophysical Profile A   Fetal Breathing A             Fetal Movement A             Fetal Tone A             Fluid Volume A             Nonstress Test A             Biophysical Profile Score (of 8) A             Biophysical Profile Score (of 10) A             BPP Entered By: A             Row Name    10/11/18  0400    10/11/18  0300    10/11/18  0200    10/11/18  0130    10/11/18  0100   Nonstress Test   Reason for NST             Acoustic Stimulator             LEVI (If Indicated) (cm)             Uterine Irritability             Contractions             Contraction Frequency (Minutes)  occ           Fetal HR Variability             Fetal HR Accelerations             Fetal HR Decelerations             Fetal HR (Beats/Min)             Contraction Frequency (min)             Fetal Assessment   Fetal Movement  active  active  active  active  active   Fetal HR Assessment Method  external  external  external  external  external   Fetal HR (beats/min)  125  130  135  130  120   Fetal Heart Baseline Rate  normal range  normal range  normal range  normal range  normal range   Fetal HR Variability  moderate (ampl-  itude range  6  to 25 bpm)  moderate (ampl-  itude range 6  to 25 bpm)  moderate (ampl-  itude range 6  to 25 bpm)  moderate (ampl-  itude range 6  to 25 bpm)  minimal (detec-  table, amplitu-  de less than or  equal to 5  bpm)   Fetal HR Accelerations  greater than/  equal to 15  bpm;lasting at  least 15 secon-  ds;prolonged  greater than/  equal to 15  bpm;lasting at  least 15 secon-  ds  greater than/  equal to 15  bpm;lasting at  least 15 secon-  ds  greater than/  equal to 15  bpm;lasting at  least 15 secon-  ds  absent   Fetal HR Decelerations  variable  absent  absent  absent  absent   Fetal HR Tracing Category  Category I           Sinusoidal Pattern Present  absent  absent  absent  absent  absent   Fetal Presentation             Additional Documentation             Interpretation A   Nonstress Test Interpretation A             Comments A             Nonstress Test Interpretation A             Overall Impression A             Biophysical Profile A   Fetal Breathing A             Fetal Movement A             Fetal Tone A             Fluid Volume A             Nonstress Test A             Biophysical Profile Score (of 8) A             Biophysical Profile Score (of 10) A             BPP Entered By: A             Row Name    10/11/18  0030    10/11/18  0000    10/10/18  2330    10/10/18  2300    10/10/18  2230   Nonstress Test   Reason for NST             Acoustic Stimulator             LEVI (If Indicated) (cm)             Uterine Irritability             Contractions             Contraction Frequency (Minutes)          3-7   Fetal HR Variability             Fetal HR Accelerations             Fetal HR Decelerations             Fetal HR (Beats/Min)             Contraction Frequency (min)             Fetal Assessment   Fetal Movement  active  active  active  active  active   Fetal HR Assessment Method  external  external  external  external  external   Fetal HR (beats/min)  120  130  125  120  120   Fetal Heart Baseline Rate   normal range  normal range  normal range  normal range  normal range   Fetal HR Variability  moderate (ampl-  itude range 6  to 25 bpm)  moderate (ampl-  itude range 6  to 25 bpm)  moderate (ampl-  itude range 6  to 25 bpm)  moderate (ampl-  itude range 6  to 25 bpm)  moderate (ampl-  itude range 6  to 25 bpm)   Fetal HR Accelerations  greater than/  equal to 15  bpm;lasting at  least 15 secon-  ds;prolonged  greater than/  equal to 15  bpm;lasting at  least 15 secon-  ds;prolonged  greater than/  equal to 15  bpm;lasting at  least 15 secon-  ds;prolonged  greater than/  equal to 15  bpm;lasting at  least 15 secon-  ds;prolonged  greater than/  equal to 15  bpm;lasting at  least 15 secon-  ds;prolonged   Fetal HR Decelerations  absent  absent  absent  absent  absent   Fetal HR Tracing Category  Category I  Category I  Category I  Category I  Category I   Sinusoidal Pattern Present  absent  absent  absent  absent  absent   Fetal Presentation             Additional Documentation             Interpretation A   Nonstress Test Interpretation A             Comments A             Nonstress Test Interpretation A             Overall Impression A             Biophysical Profile A   Fetal Breathing A             Fetal Movement A             Fetal Tone A             Fluid Volume A             Nonstress Test A             Biophysical Profile Score (of 8) A             Biophysical Profile Score (of 10) A             BPP Entered By: A             Row Name    10/10/18  2200    10/10/18  2115    10/10/18  2045    10/10/18  2015    10/10/18  1945   Nonstress Test   Reason for NST             Acoustic Stimulator             LEVI (If Indicated) (cm)             Uterine Irritability             Contractions             Contraction Frequency (Minutes)      3-8 mins  2 ctxs only  6-7 mins   Fetal HR Variability             Fetal HR Accelerations             Fetal HR Decelerations             Fetal HR (Beats/Min)             Contraction  Frequency (min)             Fetal Assessment   Fetal Movement  active  active  active  active  active   Fetal HR Assessment Method  external  external  external  external  external   Fetal HR (beats/min)  125  125  135  135  135   Fetal Heart Baseline Rate  normal range  normal range  normal range  normal range  normal range   Fetal HR Variability  moderate (ampl-  itude range 6  to 25 bpm)  moderate (ampl-  itude range 6  to 25 bpm)  moderate (ampl-  itude range 6  to 25 bpm)  moderate (ampl-  itude range 6  to 25 bpm)  moderate (ampl-  itude range 6  to 25 bpm)   Fetal HR Accelerations  greater than/  equal to 15  bpm;lasting at  least 15 secon-  ds;prolonged  greater than/  equal to 15  bpm;lasting at  least 15 secon-  ds;prolonged  greater than/  equal to 15  bpm;lasting at  least 15 secon-  ds  greater than/  equal to 15  bpm;lasting at  least 15 secon-  ds  greater than/  equal to 15  bpm;lasting at  least 15 secon-  ds   Fetal HR Decelerations  absent  absent  absent  variable  absent   Fetal HR Tracing Category  Category I  Category I  Category I  Category I  Category I   Sinusoidal Pattern Present  absent  absent  absent  absent  absent   Fetal Presentation             Additional Documentation             Interpretation A   Nonstress Test Interpretation A             Comments A             Nonstress Test Interpretation A             Overall Impression A             Biophysical Profile A   Fetal Breathing A             Fetal Movement A             Fetal Tone A             Fluid Volume A             Nonstress Test A             Biophysical Profile Score (of 8) A             Biophysical Profile Score (of 10) A             BPP Entered By: A             Row Name    10/10/18  1915    10/10/18  1845    10/10/18  1815    10/10/18  1745    10/10/18  1715   Nonstress Test   Reason for NST  Antepartum           Acoustic Stimulator  No           LEVI (If Indicated) (cm)             Uterine Irritability  No            Contractions  Irregular           Contraction Frequency (Minutes)  3-7  4-8  2-6  1-3  2-3   Fetal HR Variability             Fetal HR Accelerations             Fetal HR Decelerations             Fetal HR (Beats/Min)             Contraction Frequency (min)             Fetal Assessment   Fetal Movement  active  active  active  active     Fetal HR Assessment Method  external  external  external  external  external   Fetal HR (beats/min)  145  145  145  135  135   Fetal Heart Baseline Rate  normal range  normal range  normal range  normal range  normal range   Fetal HR Variability  moderate (ampl-  itude range 6  to 25 bpm)  moderate (ampl-  itude range 6  to 25 bpm)  moderate (ampl-  itude range 6  to 25 bpm)  moderate (ampl-  itude range 6  to 25 bpm)  moderate (ampl-  itude range 6  to 25 bpm)   Fetal HR Accelerations  greater than/  equal to 15  bpm;lasting at  least 15 secon-  ds  greater than/  equal to 15  bpm;lasting at  least 15 secon-  ds  greater than/  equal to 15  bpm;lasting at  least 15 secon-  ds  greater than/  equal to 15  bpm;lasting at  least 15 secon-  ds  greater than/  equal to 15  bpm;lasting at  least 15 secon-  ds   Fetal HR Decelerations  absent  absent  absent  absent  variable   Fetal HR Tracing Category  Category I           Sinusoidal Pattern Present  absent           Fetal Presentation             Additional Documentation             Interpretation A   Nonstress Test Interpretation A  Reactive           Comments A  VERIFIED PER GABRIELLE BAL RN.            Nonstress Test Interpretation A             Overall Impression A             Biophysical Profile A   Fetal Breathing A             Fetal Movement A             Fetal Tone A             Fluid Volume A             Nonstress Test A             Biophysical Profile Score (of 8) A             Biophysical Profile Score (of 10) A             BPP Entered By: A             Row Name    10/10/18  1640    10/10/18  7986    10/10/18  6398                Nonstress Test   Reason for NST      OB Triage             contractions       Acoustic Stimulator      No       LEVI (If Indicated) (cm)             Uterine Irritability      No       Contractions             Contraction Frequency (Minutes)  2-4  2-5  2-3       Fetal HR Variability             Fetal HR Accelerations             Fetal HR Decelerations             Fetal HR (Beats/Min)             Contraction Frequency (min)             Fetal Assessment   Fetal Movement    active  active       Fetal HR Assessment Method  external  external  external       Fetal HR (beats/min)  135  135  145       Fetal Heart Baseline Rate  normal range  normal range  normal range       Fetal HR Variability  moderate (ampl-  itude range 6  to 25 bpm)  moderate (ampl-  itude range 6  to 25 bpm)  moderate (ampl-  itude range 6  to 25 bpm)       Fetal HR Accelerations  greater than/  equal to 15  bpm;lasting at  least 15 secon-  ds  greater than/  equal to 15  bpm;lasting at  least 15 secon-  ds  greater than/  equal to 15  bpm;lasting at  least 15 secon-  ds       Fetal HR Decelerations  absent  absent  absent       Fetal HR Tracing Category      Category I       Sinusoidal Pattern Present      absent       Fetal Presentation             Additional Documentation             Interpretation A   Nonstress Test Interpretation A      Reactive       Comments A      verified by sunshine li rn       Nonstress Test Interpretation A             Overall Impression A             Biophysical Profile A   Fetal Breathing A             Fetal Movement A             Fetal Tone A             Fluid Volume A             Nonstress Test A             Biophysical Profile Score (of 8) A             Biophysical Profile Score (of 10) A             BPP Entered By: SEBASTIAN

## 2018-10-11 NOTE — PLAN OF CARE
Problem: Patient Care Overview  Goal: Plan of Care Review   10/11/18 0751   Coping/Psychosocial   Plan of Care Reviewed With patient;spouse   Plan of Care Review   Progress improving      10/11/18 0751   Coping/Psychosocial   Plan of Care Reviewed With patient;spouse   Plan of Care Review   Progress improving     Goal: Individualization and Mutuality   10/10/18 180   Mutuality/Individual Preferences   What Anxieties, Fears, Concerns, or Questions Do You Have About Your Care? prerterm delivery     Goal: Discharge Needs Assessment   10/10/18 1835 10/11/18 0751   Discharge Needs Assessment   Readmission Within the Last 30 Days --  no previous admission in last 30 days   Concerns to be Addressed --  no discharge needs identified   Patient/Family Anticipates Transition to --  home   Patient/Family Anticipated Services at Transition none --    Transportation Concerns car, none --    Transportation Anticipated car, drives self;family or friend will provide --    Anticipated Changes Related to Illness none --    Equipment Needed After Discharge none --    Offered/Gave Vendor List no --    Disability   Equipment Currently Used at Home none --        Problem:  Labor (Adult,Obstetrics,Pediatric)  Goal: Signs and Symptoms of Listed Potential Problems Will be Absent, Minimized or Managed ( Labor)   10/11/18 0751   Goal/Outcome Evaluation   Problems Assessed ( Labor) all   Problems Present ( Labor) none

## 2018-10-12 LAB — BACTERIA SPEC AEROBE CULT: NORMAL

## 2018-10-12 PROCEDURE — 25010000002 TERBUTALINE PER 1 MG

## 2018-10-12 RX ORDER — TERBUTALINE SULFATE 1 MG/ML
0.25 INJECTION, SOLUTION SUBCUTANEOUS ONCE
Status: COMPLETED | OUTPATIENT
Start: 2018-10-12 | End: 2018-10-12

## 2018-10-12 RX ORDER — NIFEDIPINE 30 MG/1
30 TABLET, FILM COATED, EXTENDED RELEASE ORAL 2 TIMES DAILY
Status: DISCONTINUED | OUTPATIENT
Start: 2018-10-12 | End: 2018-10-13

## 2018-10-12 RX ORDER — TERBUTALINE SULFATE 1 MG/ML
INJECTION, SOLUTION SUBCUTANEOUS
Status: COMPLETED
Start: 2018-10-12 | End: 2018-10-12

## 2018-10-12 RX ORDER — SODIUM CHLORIDE, SODIUM LACTATE, POTASSIUM CHLORIDE, CALCIUM CHLORIDE 600; 310; 30; 20 MG/100ML; MG/100ML; MG/100ML; MG/100ML
150 INJECTION, SOLUTION INTRAVENOUS CONTINUOUS
Status: DISCONTINUED | OUTPATIENT
Start: 2018-10-12 | End: 2018-10-13

## 2018-10-12 RX ORDER — NITROFURANTOIN 25; 75 MG/1; MG/1
100 CAPSULE ORAL EVERY 12 HOURS SCHEDULED
Status: DISCONTINUED | OUTPATIENT
Start: 2018-10-12 | End: 2018-10-14 | Stop reason: HOSPADM

## 2018-10-12 RX ADMIN — NITROFURANTOIN MONOHYDRATE/MACROCRYSTALLINE 100 MG: 25; 75 CAPSULE ORAL at 10:25

## 2018-10-12 RX ADMIN — SODIUM CHLORIDE, POTASSIUM CHLORIDE, SODIUM LACTATE AND CALCIUM CHLORIDE 150 ML/HR: 600; 310; 30; 20 INJECTION, SOLUTION INTRAVENOUS at 15:02

## 2018-10-12 RX ADMIN — PRENATAL VIT W/ FE FUMARATE-FA TAB 27-0.8 MG 1 TABLET: 27-0.8 TAB at 04:25

## 2018-10-12 RX ADMIN — NIFEDIPINE 10 MG: 10 CAPSULE ORAL at 04:34

## 2018-10-12 RX ADMIN — TERBUTALINE SULFATE 0.25 MG: 1 INJECTION, SOLUTION SUBCUTANEOUS at 14:45

## 2018-10-12 RX ADMIN — PRENATAL VIT W/ FE FUMARATE-FA TAB 27-0.8 MG 1 TABLET: 27-0.8 TAB at 21:17

## 2018-10-12 RX ADMIN — SODIUM CHLORIDE, POTASSIUM CHLORIDE, SODIUM LACTATE AND CALCIUM CHLORIDE 150 ML/HR: 600; 310; 30; 20 INJECTION, SOLUTION INTRAVENOUS at 18:37

## 2018-10-12 RX ADMIN — TERBUTALINE SULFATE 0.25 MG: 1 INJECTION SUBCUTANEOUS at 14:45

## 2018-10-12 RX ADMIN — NIFEDIPINE 10 MG: 10 CAPSULE ORAL at 16:13

## 2018-10-12 RX ADMIN — NITROFURANTOIN MONOHYDRATE/MACROCRYSTALLINE 100 MG: 25; 75 CAPSULE ORAL at 21:17

## 2018-10-12 NOTE — NON STRESS TEST
Yvonne Barnett, a  at 33w6d with an AAKASH of 2018, by Patient Reported, was seen at [facility] for a nonstress test.    Chief Complaint   Patient presents with   • Non-stress Test     dilated 1 cm in office       Patient Active Problem List   Diagnosis   • Decreased fetal movement in pregnancy   • Normal labor   • Term pregnancy delivered   • Pregnancy   •  labor       Start Time: 0900  Stop Time: 0915     Pt off mag, on procardia, now doing NST q shift.  Reactive NST confirmed with HUSSEIN Ambrose RN.

## 2018-10-12 NOTE — PLAN OF CARE
Problem:  Labor (Adult,Obstetrics,Pediatric)  Intervention: Monitor Maternal/Fetal Wellbeing   10/12/18 0045   Reproductive Interventions   Fetal Wellbeing Promotion fetal heart rate monitored;uterine contraction activity assessed

## 2018-10-12 NOTE — PROGRESS NOTES
OB Progress Note      Hospital Course: G 3, now P 1011. Patient was admitted on 10/10/2018  3:03 PM with IUP at 33w6d weeks gestation secondary to Pregnancy [Z34.90]   labor [O60.00]. Patient doing well. Symptoms have improved s/p magnesium sulfate and celestone.  Now on Procardia 10mg q 6 hours.  Pt diagnosed with UTI and received Ampicillin 1 day.      signs in last 24 hours:    Vital Signs Range for the last 24 hours              Temp:  [97.9 °F (36.6 °C)-98.3 °F (36.8 °C)] 98.3 °F (36.8 °C)  Heart Rate:  [] 81  Resp:  [16] 16  BP: ()/(44-61) 89/54     Radiology     Imaging Results (last 24 hours)     ** No results found for the last 24 hours. **           Labs     Lab Results (last 24 hours)     Procedure Component Value Units Date/Time    Group B Streptococcus Culture - Swab, Vaginal/Rectum [[id number]]  (Normal) Collected:  10/10/18 1754    Specimen:  Swab from Vaginal/Rectum Updated:  10/12/18 0910     Group B Strep Culture Culture in progress            Review of Systems    The following systems were reviewed and negative; Fever, Chills, Contractions, LOF, Vaginal bleeding, decreased fetal movement, SOB or CP      The following systems were reviewed and positive; Good fetal movement.     Physical Exam:  General:   NAD, A&O x 3  Chest: CTAB no w/r/r  CV: RRR no murmurs  Abd: Soft.  Gravid uterus and NT.    Back: No CVAT  : cervix: deferred today  Ext: no c/c/e    NST: Reactive                A/P:  1. PTL- stable s/p MgSO4, celestone will be mature today.  On procardia- hold for hypotension.  If stable today then possible d/c tomorrow.   2. GBS pending.   3. UTI- macrobid x 3 days today.   4. NST- reactive.       Marisela Thrasher DO  10/12/18  9:28 AM

## 2018-10-13 LAB
BASOPHILS # BLD AUTO: 0.01 10*3/MM3 (ref 0–0.3)
BASOPHILS NFR BLD AUTO: 0.1 % (ref 0–2)
DEPRECATED RDW RBC AUTO: 42.6 FL (ref 37–54)
EOSINOPHIL # BLD AUTO: 0 10*3/MM3 (ref 0–0.7)
EOSINOPHIL NFR BLD AUTO: 0 % (ref 0–5)
ERYTHROCYTE [DISTWIDTH] IN BLOOD BY AUTOMATED COUNT: 13.8 % (ref 11.5–14.5)
HCT VFR BLD AUTO: 28.2 % (ref 37–47)
HGB BLD-MCNC: 8.9 G/DL (ref 12–16)
IMM GRANULOCYTES # BLD: 0.07 10*3/MM3 (ref 0–0.03)
IMM GRANULOCYTES NFR BLD: 0.7 % (ref 0–0.5)
LYMPHOCYTES # BLD AUTO: 2.96 10*3/MM3 (ref 1–3)
LYMPHOCYTES NFR BLD AUTO: 29.2 % (ref 21–51)
MCH RBC QN AUTO: 26.6 PG (ref 27–33)
MCHC RBC AUTO-ENTMCNC: 31.6 G/DL (ref 33–37)
MCV RBC AUTO: 84.2 FL (ref 80–94)
MONOCYTES # BLD AUTO: 1.03 10*3/MM3 (ref 0.1–0.9)
MONOCYTES NFR BLD AUTO: 10.2 % (ref 0–10)
NEUTROPHILS # BLD AUTO: 6.06 10*3/MM3 (ref 1.4–6.5)
NEUTROPHILS NFR BLD AUTO: 59.8 % (ref 30–70)
PLATELET # BLD AUTO: 196 10*3/MM3 (ref 130–400)
PMV BLD AUTO: 10.9 FL (ref 6–10)
RBC # BLD AUTO: 3.35 10*6/MM3 (ref 4.2–5.4)
WBC NRBC COR # BLD: 10.13 10*3/MM3 (ref 4.5–12.5)

## 2018-10-13 PROCEDURE — 59025 FETAL NON-STRESS TEST: CPT

## 2018-10-13 PROCEDURE — 25010000002 ONDANSETRON PER 1 MG: Performed by: OBSTETRICS & GYNECOLOGY

## 2018-10-13 PROCEDURE — 85025 COMPLETE CBC W/AUTO DIFF WBC: CPT | Performed by: OBSTETRICS & GYNECOLOGY

## 2018-10-13 PROCEDURE — 25010000002 TERBUTALINE PER 1 MG

## 2018-10-13 RX ORDER — TERBUTALINE SULFATE 1 MG/ML
INJECTION, SOLUTION SUBCUTANEOUS
Status: COMPLETED
Start: 2018-10-13 | End: 2018-10-13

## 2018-10-13 RX ORDER — TERBUTALINE SULFATE 1 MG/ML
0.25 INJECTION, SOLUTION SUBCUTANEOUS ONCE
Status: COMPLETED | OUTPATIENT
Start: 2018-10-13 | End: 2018-10-13

## 2018-10-13 RX ORDER — FERROUS SULFATE 325(65) MG
325 TABLET ORAL
Status: DISCONTINUED | OUTPATIENT
Start: 2018-10-13 | End: 2018-10-14 | Stop reason: HOSPADM

## 2018-10-13 RX ADMIN — PRENATAL VIT W/ FE FUMARATE-FA TAB 27-0.8 MG 1 TABLET: 27-0.8 TAB at 20:42

## 2018-10-13 RX ADMIN — FERROUS SULFATE TAB 325 MG (65 MG ELEMENTAL FE) 325 MG: 325 (65 FE) TAB at 15:22

## 2018-10-13 RX ADMIN — SODIUM CHLORIDE, POTASSIUM CHLORIDE, SODIUM LACTATE AND CALCIUM CHLORIDE 150 ML/HR: 600; 310; 30; 20 INJECTION, SOLUTION INTRAVENOUS at 08:08

## 2018-10-13 RX ADMIN — NITROFURANTOIN MONOHYDRATE/MACROCRYSTALLINE 100 MG: 25; 75 CAPSULE ORAL at 08:43

## 2018-10-13 RX ADMIN — ONDANSETRON 4 MG: 2 INJECTION, SOLUTION INTRAMUSCULAR; INTRAVENOUS at 05:30

## 2018-10-13 RX ADMIN — TERBUTALINE SULFATE 0.25 MG: 1 INJECTION SUBCUTANEOUS at 05:30

## 2018-10-13 RX ADMIN — NITROFURANTOIN MONOHYDRATE/MACROCRYSTALLINE 100 MG: 25; 75 CAPSULE ORAL at 20:42

## 2018-10-13 RX ADMIN — TERBUTALINE SULFATE 0.25 MG: 1 INJECTION, SOLUTION SUBCUTANEOUS at 05:30

## 2018-10-13 RX ADMIN — NIFEDIPINE 30 MG: 30 TABLET, EXTENDED RELEASE ORAL at 02:19

## 2018-10-13 NOTE — NURSING NOTE
Dr. Thrasher on unit and discussed with her the pt's b/p and her scheduled Procardia XL and she stated to hold med if b/p remains low and pt having no contractions that should the pt start to contract and b/p 100/60 or greater to give the Procardia.

## 2018-10-13 NOTE — PROGRESS NOTES
OB Progress Note      Hospital Course: G 3, now P 1011. Patient was admitted on 10/10/2018  3:03 PM with IUP at 34w0d weeks gestation secondary to Pregnancy [Z34.90]   labor [O60.00]. Pt with painful contractions last pm resolved with Procardia 30 XL and Brethine x 1.  Denies contractions this morning.  Admits to spotting this morning when wiped but cervix was checked early this morning as well.  Admits to good fetal movement.      signs in last 24 hours:    Vital Signs Range for the last 24 hours              Temp:  [98 °F (36.7 °C)-98.4 °F (36.9 °C)] 98.4 °F (36.9 °C)  Heart Rate:  [] 103  Resp:  [18-20] 20  BP: ()/(49-64) 99/52     Radiology     Imaging Results (last 24 hours)     Bedside U/S: LEVI 8.9, Vertex.  Placenta grade 2           Labs     Lab Results (last 24 hours)     Procedure Component Value Units Date/Time    CBC & Differential [[id number]] Collected:  10/13/18 0603    Specimen:  Blood Updated:  10/13/18 0647    Narrative:       The following orders were created for panel order CBC & Differential.  Procedure                               Abnormality         Status                     ---------                               -----------         ------                     CBC Auto Differential[[id number]]        Abnormal            Final result                 Please view results for these tests on the individual orders.    CBC Auto Differential [[id number]]  (Abnormal) Collected:  10/13/18 0603    Specimen:  Blood Updated:  10/13/18 0647     WBC 10.13 10*3/mm3      RBC 3.35 (L) 10*6/mm3      Hemoglobin 8.9 (L) g/dL      Hematocrit 28.2 (L) %      MCV 84.2 fL      MCH 26.6 (L) pg      MCHC 31.6 (L) g/dL      RDW 13.8 %      RDW-SD 42.6 fl      MPV 10.9 (H) fL      Platelets 196 10*3/mm3      Neutrophil % 59.8 %      Lymphocyte % 29.2 %      Monocyte % 10.2 (H) %      Eosinophil % 0.0 %      Basophil % 0.1 %      Immature Grans % 0.7 (H) %      Neutrophils, Absolute 6.06 10*3/mm3       Lymphocytes, Absolute 2.96 10*3/mm3      Monocytes, Absolute 1.03 (H) 10*3/mm3      Eosinophils, Absolute 0.00 10*3/mm3      Basophils, Absolute 0.01 10*3/mm3      Immature Grans, Absolute 0.07 (H) 10*3/mm3             Review of Systems    The following systems were reviewed and negative; Fever, Chills, LOF,decreased fetal movement      The following systems were reviewed and positive; contractions now resolved.  Spotting now resolved.     Physical Exam:  General:   NAD, A&O x 3  Chest: CTAB no w/r/r  CV: RRR no murmurs  Abd: Soft.  Gravid uterus and NT.    Back: No CVAT  : cervix: 2-3/50/-3  Ext: no c/c/e    NST: 150 Reactive.  Moderate variability                A/P:  1. PTL- s/p celestone, Mgso4 and currently on procardia.  Will stop procardia today as now 34 weeks and s/p steroids.  Continue to monitor off procardia and possible d/c home in am.    2. UTI- continue Macrobid.   3. Oligohydramnios on 9/27 now resolved with LEVI 8.9.  Recommend to repeat in office in 1 week.  Pt to orally hydrate.  She is only drinking soda and no water.    4. Reactive NST and incidental BPP on bedside U/S by me is 10/10 with NST.       Marisela Thrasher DO  10/13/18  11:58 AM

## 2018-10-13 NOTE — NON STRESS TEST
Yvonne Barnett, a  at 34w0d with an AAKASH of 2018, by Patient Reported, was seen at [facility] for a nonstress test.    Chief Complaint   Patient presents with   • Non-stress Test     dilated 1 cm in office       Patient Active Problem List   Diagnosis   • Decreased fetal movement in pregnancy   • Normal labor   • Term pregnancy delivered   • Pregnancy   •  labor       Start Time: 0645  Stop Time: 0700    Interpretation A  Nonstress Test Interpretation A: Reactive (10/13/18 0810 : Angeline Stewart RN)  Comments A: PRAVEEN Maravilla RN (10/13/18 0810 : Angeline Stewart RN)

## 2018-10-14 VITALS
WEIGHT: 159 LBS | BODY MASS INDEX: 29.26 KG/M2 | RESPIRATION RATE: 18 BRPM | HEIGHT: 62 IN | DIASTOLIC BLOOD PRESSURE: 51 MMHG | HEART RATE: 86 BPM | TEMPERATURE: 98.3 F | SYSTOLIC BLOOD PRESSURE: 103 MMHG | OXYGEN SATURATION: 100 %

## 2018-10-14 PROBLEM — N39.0 UTI (URINARY TRACT INFECTION): Status: ACTIVE | Noted: 2018-10-14

## 2018-10-14 RX ORDER — NITROFURANTOIN 25; 75 MG/1; MG/1
100 CAPSULE ORAL EVERY 12 HOURS SCHEDULED
Qty: 6 CAPSULE | Refills: 0 | Status: SHIPPED | OUTPATIENT
Start: 2018-10-14 | End: 2018-10-17

## 2018-10-14 NOTE — PLAN OF CARE
Problem: Patient Care Overview  Goal: Discharge Needs Assessment  Outcome: Outcome(s) achieved Date Met: 10/14/18

## 2018-10-14 NOTE — DISCHARGE SUMMARY
OB Progress Note      Hospital Course: G 3, now P 1011. Patient was admitted on 10/10/2018  3:03 PM with IUP at 34w1d weeks gestation secondary to Pregnancy [Z34.90]   labor [O60.00].   She received tocolysis and celestone on 10/10 and 10/11.  She was initially started on Procardia 10mg q 6 hours but her BP did not tolerate this well.  All tocolysis stopped at 34 weeks and she has been stable overnight.  UTI is treated with Macrobid. Bedside imaging noted AFV improvement from September at 8.9.  Patient doing well. Symptoms have improved. Patient stable. D/C home with f/u in office and U/S for growth.     signs in last 24 hours:    Vital Signs Range for the last 24 hours              Temp:  [98 °F (36.7 °C)-98.5 °F (36.9 °C)] 98.1 °F (36.7 °C)  Heart Rate:  [78-91] 78  Resp:  [16-18] 18  BP: ()/(51-55) 110/51     Radiology     Imaging Results (last 24 hours)     ** No results found for the last 24 hours. **           Labs     Lab Results (last 24 hours)     Procedure Component Value Units Date/Time    Group B Streptococcus Culture - Swab, Vaginal/Rectum [[id number]]  (Abnormal) Collected:  10/10/18 2052    Specimen:  Swab from Vaginal/Rectum Updated:  10/13/18 0867     Group B Strep Culture Streptococcus, Beta Hemolytic, Group B (A)     STREP GROUPING B            Review of Systems    The following systems were reviewed and negative; Fever, Chills, Contractions, LOF, Vaginal bleeding, decreased fetal movement      The following systems were reviewed and positive; Good fetal movement.      Physical Exam:  General:   NAD, A&O x 3  Chest: CTAB no w/r/r  CV: RRR no murmurs  Abd: Soft.  Gravid uterus and NT.    Back: No CVAT  : cervix: 3/50/-3 on 10/13  Ext: no c/c/e    NST: Reactive.    [place]: occasional contraction                A/P:  1.  @ 34w1d with PTL now resolved.  PTL precautions given.  F/U next week in office for growth U/S   2. UTI- Macrobid   3. Reactive NST.       Marisela Thrasher  DO  10/14/18  8:15 AM

## 2018-10-16 LAB
BACTERIA SPEC AEROBE CULT: ABNORMAL
STREP GROUPING: ABNORMAL

## 2018-10-19 ENCOUNTER — HOSPITAL ENCOUNTER (OUTPATIENT)
Facility: HOSPITAL | Age: 23
Discharge: HOME | End: 2018-10-19
Attending: OBSTETRICS & GYNECOLOGY | Admitting: OBSTETRICS & GYNECOLOGY

## 2018-10-19 VITALS
RESPIRATION RATE: 20 BRPM | WEIGHT: 155.4 LBS | SYSTOLIC BLOOD PRESSURE: 106 MMHG | BODY MASS INDEX: 28.6 KG/M2 | HEIGHT: 62 IN | HEART RATE: 114 BPM | TEMPERATURE: 99.6 F | DIASTOLIC BLOOD PRESSURE: 57 MMHG | OXYGEN SATURATION: 99 %

## 2018-10-19 PROBLEM — O47.00 PRETERM CONTRACTIONS: Status: ACTIVE | Noted: 2018-10-19

## 2018-10-19 LAB
BACTERIA UR QL AUTO: ABNORMAL /HPF
BILIRUB UR QL STRIP: ABNORMAL
CLARITY UR: CLEAR
COLOR UR: ABNORMAL
DEPRECATED RDW RBC AUTO: 39.1 FL (ref 37–54)
ERYTHROCYTE [DISTWIDTH] IN BLOOD BY AUTOMATED COUNT: 13.8 % (ref 11.5–14.5)
GLUCOSE UR STRIP-MCNC: ABNORMAL MG/DL
HCT VFR BLD AUTO: 31.3 % (ref 37–47)
HGB BLD-MCNC: 10.1 G/DL (ref 12–16)
HGB UR QL STRIP.AUTO: NEGATIVE
HYALINE CASTS UR QL AUTO: ABNORMAL /LPF
HYPOCHROMIA BLD QL: ABNORMAL
KETONES UR QL STRIP: ABNORMAL
LEUKOCYTE ESTERASE UR QL STRIP.AUTO: ABNORMAL
LYMPHOCYTES # BLD MANUAL: 1.98 10*3/MM3 (ref 1–3)
LYMPHOCYTES NFR BLD MANUAL: 10 % (ref 0–10)
LYMPHOCYTES NFR BLD MANUAL: 18 % (ref 21–51)
MCH RBC QN AUTO: 26.6 PG (ref 27–33)
MCHC RBC AUTO-ENTMCNC: 32.3 G/DL (ref 33–37)
MCV RBC AUTO: 82.4 FL (ref 80–94)
MONOCYTES # BLD AUTO: 1.1 10*3/MM3 (ref 0.1–0.9)
NEUTROPHILS # BLD AUTO: 7.91 10*3/MM3 (ref 1.4–6.5)
NEUTROPHILS NFR BLD MANUAL: 72 % (ref 30–70)
NITRITE UR QL STRIP: POSITIVE
PH UR STRIP.AUTO: 6 [PH] (ref 5–8)
PLAT MORPH BLD: NORMAL
PLATELET # BLD AUTO: 270 10*3/MM3 (ref 130–400)
PMV BLD AUTO: 10.9 FL (ref 6–10)
PROT UR QL STRIP: ABNORMAL
RBC # BLD AUTO: 3.8 10*6/MM3 (ref 4.2–5.4)
RBC # UR: ABNORMAL /HPF
REF LAB TEST METHOD: ABNORMAL
SP GR UR STRIP: 1.03 (ref 1–1.03)
SQUAMOUS #/AREA URNS HPF: ABNORMAL /HPF
UROBILINOGEN UR QL STRIP: ABNORMAL
WBC NRBC COR # BLD: 10.99 10*3/MM3 (ref 4.5–12.5)
WBC UR QL AUTO: ABNORMAL /HPF

## 2018-10-19 PROCEDURE — 59025 FETAL NON-STRESS TEST: CPT

## 2018-10-19 PROCEDURE — P9612 CATHETERIZE FOR URINE SPEC: HCPCS

## 2018-10-19 PROCEDURE — 85007 BL SMEAR W/DIFF WBC COUNT: CPT | Performed by: OBSTETRICS & GYNECOLOGY

## 2018-10-19 PROCEDURE — 85027 COMPLETE CBC AUTOMATED: CPT | Performed by: OBSTETRICS & GYNECOLOGY

## 2018-10-19 PROCEDURE — 81001 URINALYSIS AUTO W/SCOPE: CPT | Performed by: OBSTETRICS & GYNECOLOGY

## 2018-10-19 PROCEDURE — G0463 HOSPITAL OUTPT CLINIC VISIT: HCPCS

## 2018-10-19 PROCEDURE — 36415 COLL VENOUS BLD VENIPUNCTURE: CPT | Performed by: OBSTETRICS & GYNECOLOGY

## 2018-10-19 RX ORDER — AMOXICILLIN AND CLAVULANATE POTASSIUM 875; 125 MG/1; MG/1
1 TABLET, FILM COATED ORAL EVERY 12 HOURS
Qty: 20 TABLET | Refills: 0 | Status: ON HOLD | OUTPATIENT
Start: 2018-10-19 | End: 2018-10-22 | Stop reason: DRUGHIGH

## 2018-10-21 ENCOUNTER — HOSPITAL ENCOUNTER (OUTPATIENT)
Facility: HOSPITAL | Age: 23
Setting detail: OBSERVATION
Discharge: HOME | End: 2018-10-21
Attending: OBSTETRICS & GYNECOLOGY | Admitting: OBSTETRICS & GYNECOLOGY

## 2018-10-21 VITALS
TEMPERATURE: 97.2 F | BODY MASS INDEX: 29.08 KG/M2 | RESPIRATION RATE: 20 BRPM | HEART RATE: 88 BPM | SYSTOLIC BLOOD PRESSURE: 115 MMHG | DIASTOLIC BLOOD PRESSURE: 65 MMHG | HEIGHT: 62 IN | WEIGHT: 158 LBS

## 2018-10-21 PROBLEM — Z34.90 PREGNANT: Status: ACTIVE | Noted: 2018-10-21

## 2018-10-21 LAB
6-ACETYL MORPHINE: NEGATIVE
AMPHET+METHAMPHET UR QL: NEGATIVE
BACTERIA UR QL AUTO: ABNORMAL /HPF
BARBITURATES UR QL SCN: NEGATIVE
BASOPHILS # BLD AUTO: 0.01 10*3/MM3 (ref 0–0.3)
BASOPHILS NFR BLD AUTO: 0.1 % (ref 0–2)
BENZODIAZ UR QL SCN: NEGATIVE
BILIRUB UR QL STRIP: NEGATIVE
BUPRENORPHINE SERPL-MCNC: NEGATIVE NG/ML
CANNABINOIDS SERPL QL: NEGATIVE
CLARITY UR: CLEAR
COCAINE UR QL: NEGATIVE
COLOR UR: YELLOW
DEPRECATED RDW RBC AUTO: 40.6 FL (ref 37–54)
EOSINOPHIL # BLD AUTO: 0.02 10*3/MM3 (ref 0–0.7)
EOSINOPHIL NFR BLD AUTO: 0.2 % (ref 0–5)
ERYTHROCYTE [DISTWIDTH] IN BLOOD BY AUTOMATED COUNT: 14.2 % (ref 11.5–14.5)
GLUCOSE UR STRIP-MCNC: NEGATIVE MG/DL
HCT VFR BLD AUTO: 31.3 % (ref 37–47)
HGB BLD-MCNC: 9.8 G/DL (ref 12–16)
HGB UR QL STRIP.AUTO: ABNORMAL
HYALINE CASTS UR QL AUTO: ABNORMAL /LPF
IMM GRANULOCYTES # BLD: 0.04 10*3/MM3 (ref 0–0.03)
IMM GRANULOCYTES NFR BLD: 0.3 % (ref 0–0.5)
KETONES UR QL STRIP: ABNORMAL
LEUKOCYTE ESTERASE UR QL STRIP.AUTO: ABNORMAL
LYMPHOCYTES # BLD AUTO: 3.33 10*3/MM3 (ref 1–3)
LYMPHOCYTES NFR BLD AUTO: 27.7 % (ref 21–51)
MCH RBC QN AUTO: 26.1 PG (ref 27–33)
MCHC RBC AUTO-ENTMCNC: 31.3 G/DL (ref 33–37)
MCV RBC AUTO: 83.5 FL (ref 80–94)
METHADONE UR QL SCN: NEGATIVE
MONOCYTES # BLD AUTO: 0.67 10*3/MM3 (ref 0.1–0.9)
MONOCYTES NFR BLD AUTO: 5.6 % (ref 0–10)
NEUTROPHILS # BLD AUTO: 7.97 10*3/MM3 (ref 1.4–6.5)
NEUTROPHILS NFR BLD AUTO: 66.1 % (ref 30–70)
NITRITE UR QL STRIP: NEGATIVE
OPIATES UR QL: NEGATIVE
OXYCODONE UR QL SCN: NEGATIVE
PCP UR QL SCN: NEGATIVE
PH UR STRIP.AUTO: 7 [PH] (ref 5–8)
PLATELET # BLD AUTO: 252 10*3/MM3 (ref 130–400)
PMV BLD AUTO: 11.4 FL (ref 6–10)
PROT UR QL STRIP: NEGATIVE
RBC # BLD AUTO: 3.75 10*6/MM3 (ref 4.2–5.4)
RBC # UR: ABNORMAL /HPF
REF LAB TEST METHOD: ABNORMAL
SP GR UR STRIP: 1 (ref 1–1.03)
SQUAMOUS #/AREA URNS HPF: ABNORMAL /HPF
UROBILINOGEN UR QL STRIP: ABNORMAL
WBC NRBC COR # BLD: 12.04 10*3/MM3 (ref 4.5–12.5)
WBC UR QL AUTO: ABNORMAL /HPF

## 2018-10-21 PROCEDURE — 80307 DRUG TEST PRSMV CHEM ANLYZR: CPT | Performed by: OBSTETRICS & GYNECOLOGY

## 2018-10-21 PROCEDURE — G0378 HOSPITAL OBSERVATION PER HR: HCPCS

## 2018-10-21 PROCEDURE — G0463 HOSPITAL OUTPT CLINIC VISIT: HCPCS

## 2018-10-21 PROCEDURE — 85025 COMPLETE CBC W/AUTO DIFF WBC: CPT | Performed by: OBSTETRICS & GYNECOLOGY

## 2018-10-21 PROCEDURE — 59025 FETAL NON-STRESS TEST: CPT

## 2018-10-21 PROCEDURE — 96360 HYDRATION IV INFUSION INIT: CPT

## 2018-10-21 PROCEDURE — 81001 URINALYSIS AUTO W/SCOPE: CPT | Performed by: OBSTETRICS & GYNECOLOGY

## 2018-10-21 PROCEDURE — 87086 URINE CULTURE/COLONY COUNT: CPT | Performed by: OBSTETRICS & GYNECOLOGY

## 2018-10-21 RX ORDER — SODIUM CHLORIDE, SODIUM LACTATE, POTASSIUM CHLORIDE, CALCIUM CHLORIDE 600; 310; 30; 20 MG/100ML; MG/100ML; MG/100ML; MG/100ML
125 INJECTION, SOLUTION INTRAVENOUS CONTINUOUS
Status: DISCONTINUED | OUTPATIENT
Start: 2018-10-21 | End: 2018-10-21 | Stop reason: HOSPADM

## 2018-10-21 RX ORDER — LIDOCAINE HYDROCHLORIDE 10 MG/ML
INJECTION, SOLUTION EPIDURAL; INFILTRATION; INTRACAUDAL; PERINEURAL
Status: DISCONTINUED
Start: 2018-10-21 | End: 2018-10-21 | Stop reason: HOSPADM

## 2018-10-21 RX ORDER — AMOXICILLIN AND CLAVULANATE POTASSIUM 875; 125 MG/1; MG/1
1 TABLET, FILM COATED ORAL EVERY 12 HOURS SCHEDULED
Status: DISCONTINUED | OUTPATIENT
Start: 2018-10-21 | End: 2018-10-21 | Stop reason: HOSPADM

## 2018-10-21 RX ADMIN — AMOXICILLIN AND CLAVULANATE POTASSIUM 1 TABLET: 875; 125 TABLET, FILM COATED ORAL at 09:59

## 2018-10-21 RX ADMIN — SODIUM CHLORIDE, POTASSIUM CHLORIDE, SODIUM LACTATE AND CALCIUM CHLORIDE 125 ML/HR: 600; 310; 30; 20 INJECTION, SOLUTION INTRAVENOUS at 10:00

## 2018-10-21 RX ADMIN — SODIUM CHLORIDE, POTASSIUM CHLORIDE, SODIUM LACTATE AND CALCIUM CHLORIDE 125 ML/HR: 600; 310; 30; 20 INJECTION, SOLUTION INTRAVENOUS at 09:19

## 2018-10-22 ENCOUNTER — HOSPITAL ENCOUNTER (INPATIENT)
Facility: HOSPITAL | Age: 23
LOS: 2 days | Discharge: HOME | End: 2018-10-24
Attending: OBSTETRICS & GYNECOLOGY | Admitting: OBSTETRICS & GYNECOLOGY

## 2018-10-22 ENCOUNTER — ANESTHESIA EVENT (OUTPATIENT)
Dept: LABOR AND DELIVERY | Facility: HOSPITAL | Age: 23
End: 2018-10-22

## 2018-10-22 ENCOUNTER — ANESTHESIA (OUTPATIENT)
Dept: LABOR AND DELIVERY | Facility: HOSPITAL | Age: 23
End: 2018-10-22

## 2018-10-22 LAB
A1 MICROGLOB PLACENTAL VAG QL: POSITIVE
ABO GROUP BLD: NORMAL
BLD GP AB SCN SERPL QL: NEGATIVE
DEPRECATED RDW RBC AUTO: 42.3 FL (ref 37–54)
ERYTHROCYTE [DISTWIDTH] IN BLOOD BY AUTOMATED COUNT: 14.5 % (ref 11.5–14.5)
HCT VFR BLD AUTO: 32.1 % (ref 37–47)
HGB BLD-MCNC: 10 G/DL (ref 12–16)
HIV1+2 AB SER QL: NORMAL
MCH RBC QN AUTO: 26.2 PG (ref 27–33)
MCHC RBC AUTO-ENTMCNC: 31.2 G/DL (ref 33–37)
MCV RBC AUTO: 84.3 FL (ref 80–94)
PLATELET # BLD AUTO: 237 10*3/MM3 (ref 130–400)
PMV BLD AUTO: 11.4 FL (ref 6–10)
RBC # BLD AUTO: 3.81 10*6/MM3 (ref 4.2–5.4)
RH BLD: POSITIVE
T&S EXPIRATION DATE: NORMAL
WBC NRBC COR # BLD: 8.67 10*3/MM3 (ref 4.5–12.5)

## 2018-10-22 PROCEDURE — 86901 BLOOD TYPING SEROLOGIC RH(D): CPT | Performed by: OBSTETRICS & GYNECOLOGY

## 2018-10-22 PROCEDURE — C1755 CATHETER, INTRASPINAL: HCPCS

## 2018-10-22 PROCEDURE — C1755 CATHETER, INTRASPINAL: HCPCS | Performed by: NURSE ANESTHETIST, CERTIFIED REGISTERED

## 2018-10-22 PROCEDURE — 85027 COMPLETE CBC AUTOMATED: CPT | Performed by: OBSTETRICS & GYNECOLOGY

## 2018-10-22 PROCEDURE — G0432 EIA HIV-1/HIV-2 SCREEN: HCPCS | Performed by: OBSTETRICS & GYNECOLOGY

## 2018-10-22 PROCEDURE — 59025 FETAL NON-STRESS TEST: CPT

## 2018-10-22 PROCEDURE — G0463 HOSPITAL OUTPT CLINIC VISIT: HCPCS

## 2018-10-22 PROCEDURE — 86900 BLOOD TYPING SEROLOGIC ABO: CPT | Performed by: OBSTETRICS & GYNECOLOGY

## 2018-10-22 PROCEDURE — 86850 RBC ANTIBODY SCREEN: CPT | Performed by: OBSTETRICS & GYNECOLOGY

## 2018-10-22 PROCEDURE — 25010000002 FENTANYL CITRATE (PF) 100 MCG/2ML SOLUTION: Performed by: NURSE ANESTHETIST, CERTIFIED REGISTERED

## 2018-10-22 PROCEDURE — 84112 EVAL AMNIOTIC FLUID PROTEIN: CPT | Performed by: OBSTETRICS & GYNECOLOGY

## 2018-10-22 RX ORDER — OXYTOCIN-SODIUM CHLORIDE 0.9% IV SOLN 30 UNIT/500ML 30-0.9/5 UT/ML-%
250 SOLUTION INTRAVENOUS CONTINUOUS
Status: DISCONTINUED | OUTPATIENT
Start: 2018-10-22 | End: 2018-10-22

## 2018-10-22 RX ORDER — GLYCERIN/PROPYLENE GLYCOL/WATR
SOLUTION, NON-ORAL VAGINAL
Status: DISPENSED
Start: 2018-10-22

## 2018-10-22 RX ORDER — ONDANSETRON 4 MG/1
4 TABLET, ORALLY DISINTEGRATING ORAL EVERY 6 HOURS PRN
Status: DISCONTINUED | OUTPATIENT
Start: 2018-10-22 | End: 2018-10-24 | Stop reason: HOSPADM

## 2018-10-22 RX ORDER — BUTORPHANOL TARTRATE 1 MG/ML
1 INJECTION, SOLUTION INTRAMUSCULAR; INTRAVENOUS
Status: DISCONTINUED | OUTPATIENT
Start: 2018-10-22 | End: 2018-10-22

## 2018-10-22 RX ORDER — ONDANSETRON 2 MG/ML
4 INJECTION INTRAMUSCULAR; INTRAVENOUS EVERY 6 HOURS PRN
Status: DISCONTINUED | OUTPATIENT
Start: 2018-10-22 | End: 2018-10-22

## 2018-10-22 RX ORDER — ONDANSETRON 4 MG/1
4 TABLET, ORALLY DISINTEGRATING ORAL EVERY 6 HOURS PRN
Status: DISCONTINUED | OUTPATIENT
Start: 2018-10-22 | End: 2018-10-22

## 2018-10-22 RX ORDER — ONDANSETRON 2 MG/ML
4 INJECTION INTRAMUSCULAR; INTRAVENOUS EVERY 6 HOURS PRN
Status: DISCONTINUED | OUTPATIENT
Start: 2018-10-22 | End: 2018-10-24 | Stop reason: HOSPADM

## 2018-10-22 RX ORDER — EPHEDRINE SULFATE 50 MG/ML
10 INJECTION, SOLUTION INTRAVENOUS
Status: DISCONTINUED | OUTPATIENT
Start: 2018-10-22 | End: 2018-10-22 | Stop reason: HOSPADM

## 2018-10-22 RX ORDER — SODIUM CHLORIDE 0.9 % (FLUSH) 0.9 %
3 SYRINGE (ML) INJECTION EVERY 12 HOURS SCHEDULED
Status: DISCONTINUED | OUTPATIENT
Start: 2018-10-22 | End: 2018-10-22

## 2018-10-22 RX ORDER — AMOXICILLIN AND CLAVULANATE POTASSIUM 875; 125 MG/1; MG/1
1 TABLET, FILM COATED ORAL 2 TIMES DAILY
COMMUNITY
Start: 2018-10-19 | End: 2018-10-29

## 2018-10-22 RX ORDER — CALCIUM CARBONATE 200(500)MG
2 TABLET,CHEWABLE ORAL 3 TIMES DAILY PRN
Status: DISCONTINUED | OUTPATIENT
Start: 2018-10-22 | End: 2018-10-22 | Stop reason: HOSPADM

## 2018-10-22 RX ORDER — GLYCERIN/PROPYLENE GLYCOL/WATR
1 SOLUTION, NON-ORAL VAGINAL AS NEEDED
Status: DISCONTINUED | OUTPATIENT
Start: 2018-10-22 | End: 2018-10-22

## 2018-10-22 RX ORDER — AMOXICILLIN AND CLAVULANATE POTASSIUM 875; 125 MG/1; MG/1
1 TABLET, FILM COATED ORAL EVERY 12 HOURS SCHEDULED
Status: CANCELLED | OUTPATIENT
Start: 2018-10-22 | End: 2018-10-29

## 2018-10-22 RX ORDER — PROMETHAZINE HYDROCHLORIDE 25 MG/ML
12.5 INJECTION, SOLUTION INTRAMUSCULAR; INTRAVENOUS EVERY 6 HOURS PRN
Status: DISCONTINUED | OUTPATIENT
Start: 2018-10-22 | End: 2018-10-22

## 2018-10-22 RX ORDER — FENTANYL CITRATE 50 UG/ML
100 INJECTION, SOLUTION INTRAMUSCULAR; INTRAVENOUS ONCE
Status: COMPLETED | OUTPATIENT
Start: 2018-10-22 | End: 2018-10-22

## 2018-10-22 RX ORDER — LIDOCAINE HYDROCHLORIDE 10 MG/ML
5 INJECTION, SOLUTION EPIDURAL; INFILTRATION; INTRACAUDAL; PERINEURAL AS NEEDED
Status: DISCONTINUED | OUTPATIENT
Start: 2018-10-22 | End: 2018-10-22

## 2018-10-22 RX ORDER — OXYTOCIN-SODIUM CHLORIDE 0.9% IV SOLN 30 UNIT/500ML 30-0.9/5 UT/ML-%
2-30 SOLUTION INTRAVENOUS
Status: DISCONTINUED | OUTPATIENT
Start: 2018-10-22 | End: 2018-10-22

## 2018-10-22 RX ORDER — PROMETHAZINE HYDROCHLORIDE 12.5 MG/1
12.5 SUPPOSITORY RECTAL EVERY 6 HOURS PRN
Status: DISCONTINUED | OUTPATIENT
Start: 2018-10-22 | End: 2018-10-22

## 2018-10-22 RX ORDER — SODIUM CHLORIDE 0.9 % (FLUSH) 0.9 %
1-10 SYRINGE (ML) INJECTION AS NEEDED
Status: DISCONTINUED | OUTPATIENT
Start: 2018-10-22 | End: 2018-10-24 | Stop reason: HOSPADM

## 2018-10-22 RX ORDER — MISOPROSTOL 100 UG/1
800 TABLET ORAL AS NEEDED
Status: DISCONTINUED | OUTPATIENT
Start: 2018-10-22 | End: 2018-10-22 | Stop reason: HOSPADM

## 2018-10-22 RX ORDER — OXYTOCIN-SODIUM CHLORIDE 0.9% IV SOLN 30 UNIT/500ML 30-0.9/5 UT/ML-%
999 SOLUTION INTRAVENOUS ONCE
Status: DISCONTINUED | OUTPATIENT
Start: 2018-10-22 | End: 2018-10-23

## 2018-10-22 RX ORDER — PRENATAL VIT/IRON FUM/FOLIC AC 27MG-0.8MG
1 TABLET ORAL NIGHTLY
Status: DISCONTINUED | OUTPATIENT
Start: 2018-10-22 | End: 2018-10-22

## 2018-10-22 RX ORDER — SODIUM CHLORIDE, SODIUM LACTATE, POTASSIUM CHLORIDE, CALCIUM CHLORIDE 600; 310; 30; 20 MG/100ML; MG/100ML; MG/100ML; MG/100ML
INJECTION, SOLUTION INTRAVENOUS
Status: DISPENSED
Start: 2018-10-22

## 2018-10-22 RX ORDER — ACETAMINOPHEN 325 MG/1
650 TABLET ORAL EVERY 4 HOURS PRN
Status: DISCONTINUED | OUTPATIENT
Start: 2018-10-22 | End: 2018-10-24 | Stop reason: HOSPADM

## 2018-10-22 RX ORDER — PROMETHAZINE HYDROCHLORIDE 25 MG/1
12.5 TABLET ORAL EVERY 6 HOURS PRN
Status: DISCONTINUED | OUTPATIENT
Start: 2018-10-22 | End: 2018-10-22

## 2018-10-22 RX ORDER — LIDOCAINE HYDROCHLORIDE 10 MG/ML
INJECTION, SOLUTION EPIDURAL; INFILTRATION; INTRACAUDAL; PERINEURAL AS NEEDED
Status: DISCONTINUED | OUTPATIENT
Start: 2018-10-22 | End: 2018-10-25 | Stop reason: SURG

## 2018-10-22 RX ORDER — SODIUM CHLORIDE, SODIUM LACTATE, POTASSIUM CHLORIDE, CALCIUM CHLORIDE 600; 310; 30; 20 MG/100ML; MG/100ML; MG/100ML; MG/100ML
125 INJECTION, SOLUTION INTRAVENOUS CONTINUOUS
Status: DISCONTINUED | OUTPATIENT
Start: 2018-10-22 | End: 2018-10-22

## 2018-10-22 RX ORDER — FENTANYL CITRATE 50 UG/ML
INJECTION, SOLUTION INTRAMUSCULAR; INTRAVENOUS
Status: DISPENSED
Start: 2018-10-22

## 2018-10-22 RX ORDER — PROMETHAZINE HYDROCHLORIDE 25 MG/1
25 TABLET ORAL EVERY 6 HOURS PRN
COMMUNITY
End: 2020-06-01

## 2018-10-22 RX ORDER — MAGNESIUM HYDROXIDE 1200 MG/15ML
1000 LIQUID ORAL ONCE AS NEEDED
Status: DISCONTINUED | OUTPATIENT
Start: 2018-10-22 | End: 2018-10-22

## 2018-10-22 RX ORDER — LIDOCAINE HYDROCHLORIDE 10 MG/ML
INJECTION, SOLUTION INFILTRATION; PERINEURAL AS NEEDED
Status: DISCONTINUED | OUTPATIENT
Start: 2018-10-22 | End: 2018-10-25 | Stop reason: SURG

## 2018-10-22 RX ORDER — HYDROCODONE BITARTRATE AND ACETAMINOPHEN 5; 325 MG/1; MG/1
1 TABLET ORAL EVERY 4 HOURS PRN
Status: DISCONTINUED | OUTPATIENT
Start: 2018-10-22 | End: 2018-10-22 | Stop reason: HOSPADM

## 2018-10-22 RX ORDER — ONDANSETRON 4 MG/1
4 TABLET, FILM COATED ORAL EVERY 6 HOURS PRN
Status: DISCONTINUED | OUTPATIENT
Start: 2018-10-22 | End: 2018-10-24 | Stop reason: HOSPADM

## 2018-10-22 RX ORDER — DIPHENHYDRAMINE HCL 25 MG
25 CAPSULE ORAL NIGHTLY PRN
Status: DISCONTINUED | OUTPATIENT
Start: 2018-10-22 | End: 2018-10-22 | Stop reason: HOSPADM

## 2018-10-22 RX ORDER — OXYTOCIN-SODIUM CHLORIDE 0.9% IV SOLN 30 UNIT/500ML 30-0.9/5 UT/ML-%
SOLUTION INTRAVENOUS
Status: DISPENSED
Start: 2018-10-22

## 2018-10-22 RX ORDER — DOCUSATE SODIUM 100 MG/1
100 CAPSULE, LIQUID FILLED ORAL 2 TIMES DAILY
Status: DISCONTINUED | OUTPATIENT
Start: 2018-10-22 | End: 2018-10-24 | Stop reason: HOSPADM

## 2018-10-22 RX ORDER — METHYLERGONOVINE MALEATE 0.2 MG/ML
200 INJECTION INTRAVENOUS ONCE AS NEEDED
Status: DISCONTINUED | OUTPATIENT
Start: 2018-10-22 | End: 2018-10-22 | Stop reason: HOSPADM

## 2018-10-22 RX ORDER — DIPHENHYDRAMINE HYDROCHLORIDE 50 MG/ML
25 INJECTION INTRAMUSCULAR; INTRAVENOUS NIGHTLY PRN
Status: DISCONTINUED | OUTPATIENT
Start: 2018-10-22 | End: 2018-10-22

## 2018-10-22 RX ORDER — IBUPROFEN 800 MG/1
800 TABLET ORAL 3 TIMES DAILY
Status: DISCONTINUED | OUTPATIENT
Start: 2018-10-22 | End: 2018-10-24 | Stop reason: HOSPADM

## 2018-10-22 RX ORDER — DIPHENHYDRAMINE HCL 25 MG
25 CAPSULE ORAL NIGHTLY PRN
Status: DISCONTINUED | OUTPATIENT
Start: 2018-10-22 | End: 2018-10-22

## 2018-10-22 RX ORDER — DIPHENHYDRAMINE HYDROCHLORIDE 50 MG/ML
25 INJECTION INTRAMUSCULAR; INTRAVENOUS NIGHTLY PRN
Status: DISCONTINUED | OUTPATIENT
Start: 2018-10-22 | End: 2018-10-22 | Stop reason: HOSPADM

## 2018-10-22 RX ORDER — HYDROCODONE BITARTRATE AND ACETAMINOPHEN 5; 325 MG/1; MG/1
1 TABLET ORAL EVERY 4 HOURS PRN
Status: DISCONTINUED | OUTPATIENT
Start: 2018-10-22 | End: 2018-10-24 | Stop reason: HOSPADM

## 2018-10-22 RX ORDER — SODIUM CHLORIDE 0.9 % (FLUSH) 0.9 %
3-10 SYRINGE (ML) INJECTION AS NEEDED
Status: DISCONTINUED | OUTPATIENT
Start: 2018-10-22 | End: 2018-10-22

## 2018-10-22 RX ORDER — ZOLPIDEM TARTRATE 5 MG/1
5 TABLET ORAL NIGHTLY PRN
Status: DISCONTINUED | OUTPATIENT
Start: 2018-10-22 | End: 2018-10-24 | Stop reason: HOSPADM

## 2018-10-22 RX ORDER — ONDANSETRON 4 MG/1
4 TABLET, FILM COATED ORAL EVERY 6 HOURS PRN
Status: DISCONTINUED | OUTPATIENT
Start: 2018-10-22 | End: 2018-10-22

## 2018-10-22 RX ORDER — IBUPROFEN 600 MG/1
600 TABLET ORAL EVERY 6 HOURS PRN
Status: DISCONTINUED | OUTPATIENT
Start: 2018-10-22 | End: 2018-10-22 | Stop reason: HOSPADM

## 2018-10-22 RX ORDER — FAMOTIDINE 10 MG/ML
20 INJECTION, SOLUTION INTRAVENOUS ONCE AS NEEDED
Status: DISCONTINUED | OUTPATIENT
Start: 2018-10-22 | End: 2018-10-22 | Stop reason: HOSPADM

## 2018-10-22 RX ORDER — PROMETHAZINE HYDROCHLORIDE 25 MG/1
25 TABLET ORAL EVERY 6 HOURS PRN
Status: CANCELLED | OUTPATIENT
Start: 2018-10-22

## 2018-10-22 RX ORDER — ONDANSETRON 4 MG/1
4 TABLET, FILM COATED ORAL EVERY 6 HOURS PRN
Status: DISCONTINUED | OUTPATIENT
Start: 2018-10-22 | End: 2018-10-22 | Stop reason: HOSPADM

## 2018-10-22 RX ORDER — TERBUTALINE SULFATE 1 MG/ML
0.25 INJECTION, SOLUTION SUBCUTANEOUS AS NEEDED
Status: DISCONTINUED | OUTPATIENT
Start: 2018-10-22 | End: 2018-10-22

## 2018-10-22 RX ORDER — ACETAMINOPHEN 325 MG/1
650 TABLET ORAL EVERY 4 HOURS PRN
Status: DISCONTINUED | OUTPATIENT
Start: 2018-10-22 | End: 2018-10-22

## 2018-10-22 RX ORDER — LANOLIN 100 %
OINTMENT (GRAM) TOPICAL
Status: DISCONTINUED | OUTPATIENT
Start: 2018-10-22 | End: 2018-10-24 | Stop reason: HOSPADM

## 2018-10-22 RX ORDER — CARBOPROST TROMETHAMINE 250 UG/ML
250 INJECTION, SOLUTION INTRAMUSCULAR AS NEEDED
Status: DISCONTINUED | OUTPATIENT
Start: 2018-10-22 | End: 2018-10-22 | Stop reason: HOSPADM

## 2018-10-22 RX ORDER — FENTANYL CIT 0.2 MG/100ML-ROPIV 0.2%-NACL 0.9% EPIDURAL INJ 2/0.2 MCG/ML-%
12 SOLUTION INJECTION CONTINUOUS
Status: DISCONTINUED | OUTPATIENT
Start: 2018-10-22 | End: 2018-10-22

## 2018-10-22 RX ORDER — HYDROCODONE BITARTRATE AND ACETAMINOPHEN 7.5; 325 MG/1; MG/1
2 TABLET ORAL EVERY 4 HOURS PRN
Status: DISCONTINUED | OUTPATIENT
Start: 2018-10-22 | End: 2018-10-22 | Stop reason: HOSPADM

## 2018-10-22 RX ORDER — LIDOCAINE HYDROCHLORIDE AND EPINEPHRINE 15; 5 MG/ML; UG/ML
INJECTION, SOLUTION EPIDURAL AS NEEDED
Status: DISCONTINUED | OUTPATIENT
Start: 2018-10-22 | End: 2018-10-25 | Stop reason: SURG

## 2018-10-22 RX ORDER — OXYTOCIN-SODIUM CHLORIDE 0.9% IV SOLN 30 UNIT/500ML 30-0.9/5 UT/ML-%
125 SOLUTION INTRAVENOUS CONTINUOUS PRN
Status: DISCONTINUED | OUTPATIENT
Start: 2018-10-22 | End: 2018-10-23

## 2018-10-22 RX ORDER — ONDANSETRON 4 MG/1
4 TABLET, ORALLY DISINTEGRATING ORAL EVERY 6 HOURS PRN
Status: DISCONTINUED | OUTPATIENT
Start: 2018-10-22 | End: 2018-10-22 | Stop reason: HOSPADM

## 2018-10-22 RX ORDER — ONDANSETRON 2 MG/ML
4 INJECTION INTRAMUSCULAR; INTRAVENOUS ONCE AS NEEDED
Status: DISCONTINUED | OUTPATIENT
Start: 2018-10-22 | End: 2018-10-22 | Stop reason: HOSPADM

## 2018-10-22 RX ORDER — ONDANSETRON 2 MG/ML
4 INJECTION INTRAMUSCULAR; INTRAVENOUS EVERY 6 HOURS PRN
Status: DISCONTINUED | OUTPATIENT
Start: 2018-10-22 | End: 2018-10-22 | Stop reason: HOSPADM

## 2018-10-22 RX ADMIN — BENZOCAINE AND LEVOMENTHOL: 200; 5 SPRAY TOPICAL at 16:28

## 2018-10-22 RX ADMIN — SODIUM CHLORIDE, POTASSIUM CHLORIDE, SODIUM LACTATE AND CALCIUM CHLORIDE 125 ML/HR: 600; 310; 30; 20 INJECTION, SOLUTION INTRAVENOUS at 05:12

## 2018-10-22 RX ADMIN — FENTANYL CIT 0.2 MG/100ML-ROPIV 0.2%-NACL 0.9% EPIDURAL INJ 12 ML/HR: 2/0.2 SOLUTION at 10:07

## 2018-10-22 RX ADMIN — AMPICILLIN SODIUM 1 G: 1 INJECTION, POWDER, FOR SOLUTION INTRAMUSCULAR; INTRAVENOUS at 09:20

## 2018-10-22 RX ADMIN — IBUPROFEN 800 MG: 800 TABLET, FILM COATED ORAL at 16:28

## 2018-10-22 RX ADMIN — AMPICILLIN SODIUM 2 G: 2 INJECTION, POWDER, FOR SOLUTION INTRAMUSCULAR; INTRAVENOUS at 05:12

## 2018-10-22 RX ADMIN — LIDOCAINE HYDROCHLORIDE 3 ML: 10 INJECTION, SOLUTION INFILTRATION; PERINEURAL at 10:01

## 2018-10-22 RX ADMIN — SODIUM CHLORIDE, POTASSIUM CHLORIDE, SODIUM LACTATE AND CALCIUM CHLORIDE 125 ML/HR: 600; 310; 30; 20 INJECTION, SOLUTION INTRAVENOUS at 09:21

## 2018-10-22 RX ADMIN — HYDROCODONE BITARTRATE AND ACETAMINOPHEN 1 TABLET: 5; 325 TABLET ORAL at 19:55

## 2018-10-22 RX ADMIN — OXYTOCIN 2 MILLI-UNITS/MIN: 10 INJECTION INTRAVENOUS at 08:50

## 2018-10-22 RX ADMIN — FENTANYL CITRATE 100 MCG: 50 INJECTION, SOLUTION INTRAMUSCULAR; INTRAVENOUS at 10:07

## 2018-10-22 RX ADMIN — LIDOCAINE HYDROCHLORIDE 5 ML: 10 INJECTION, SOLUTION EPIDURAL; INFILTRATION; INTRACAUDAL; PERINEURAL at 10:07

## 2018-10-22 RX ADMIN — LIDOCAINE HYDROCHLORIDE AND EPINEPHRINE 3 ML: 15; 5 INJECTION, SOLUTION EPIDURAL at 10:04

## 2018-10-22 NOTE — ANESTHESIA PROCEDURE NOTES
Labor Epidural      Patient location during procedure: OB  Start Time: 10/22/2018 9:58 AM  Stop Time: 10/22/2018 10:07 AM  Indication:at surgeon's request  Performed By  CRNA: DYANA RAHMAN  Preanesthetic Checklist  Completed: patient identified, site marked, surgical consent, pre-op evaluation, timeout performed, IV checked, risks and benefits discussed and monitors and equipment checked  Prep:  Pt Position:sitting  Sterile Tech:cap, gloves, mask and sterile barrier  Prep:povidone-iodine 7.5% surgical scrub  Monitoring:blood pressure monitoring and continuous pulse oximetry  Epidural Block Procedure:  Approach:midline  Guidance:landmark technique  Location:L3-L4  Needle Type:Tuohy  Needle Gauge:18 G  Loss of Resistance Medium: saline  Loss of Resistance: 5cm  Cath Depth at skin:11 cm  Paresthesia: none  Aspiration:negative  Test Dose:negative  Number of Attempts: 1  Post Assessment:  Dressing:secured with tape and occlusive dressing applied  Pt Tolerance:patient tolerated the procedure well with no apparent complications  Complications:no

## 2018-10-22 NOTE — ANESTHESIA PREPROCEDURE EVALUATION
Anesthesia Evaluation     Patient summary reviewed and Nursing notes reviewed   no history of anesthetic complications:               Airway   Mallampati: II  TM distance: >3 FB  Neck ROM: full  No difficulty expected  Dental - normal exam     Pulmonary - negative pulmonary ROS and normal exam   Cardiovascular - negative cardio ROS and normal exam        Neuro/Psych  (+) headaches,     GI/Hepatic/Renal/Endo - negative ROS     Musculoskeletal (-) negative ROS    Abdominal  - normal exam   Substance History - negative use     OB/GYN    (+) Pregnant,         Other - negative ROS                       Anesthesia Plan    ASA 2     epidural     Anesthetic plan, all risks, benefits, and alternatives have been provided, discussed and informed consent has been obtained with: patient.

## 2018-10-23 LAB
BASOPHILS # BLD AUTO: 0.01 10*3/MM3 (ref 0–0.3)
BASOPHILS NFR BLD AUTO: 0.1 % (ref 0–2)
DEPRECATED RDW RBC AUTO: 43.7 FL (ref 37–54)
EOSINOPHIL # BLD AUTO: 0.06 10*3/MM3 (ref 0–0.7)
EOSINOPHIL NFR BLD AUTO: 0.6 % (ref 0–5)
ERYTHROCYTE [DISTWIDTH] IN BLOOD BY AUTOMATED COUNT: 14.5 % (ref 11.5–14.5)
HCT VFR BLD AUTO: 30.5 % (ref 37–47)
HGB BLD-MCNC: 9.7 G/DL (ref 12–16)
IMM GRANULOCYTES # BLD: 0.03 10*3/MM3 (ref 0–0.03)
IMM GRANULOCYTES NFR BLD: 0.3 % (ref 0–0.5)
LYMPHOCYTES # BLD AUTO: 3.42 10*3/MM3 (ref 1–3)
LYMPHOCYTES NFR BLD AUTO: 33.9 % (ref 21–51)
MCH RBC QN AUTO: 26.4 PG (ref 27–33)
MCHC RBC AUTO-ENTMCNC: 31.8 G/DL (ref 33–37)
MCV RBC AUTO: 83.1 FL (ref 80–94)
MONOCYTES # BLD AUTO: 0.66 10*3/MM3 (ref 0.1–0.9)
MONOCYTES NFR BLD AUTO: 6.5 % (ref 0–10)
NEUTROPHILS # BLD AUTO: 5.92 10*3/MM3 (ref 1.4–6.5)
NEUTROPHILS NFR BLD AUTO: 58.6 % (ref 30–70)
PLATELET # BLD AUTO: 203 10*3/MM3 (ref 130–400)
PMV BLD AUTO: 11.4 FL (ref 6–10)
RBC # BLD AUTO: 3.67 10*6/MM3 (ref 4.2–5.4)
WBC NRBC COR # BLD: 10.1 10*3/MM3 (ref 4.5–12.5)

## 2018-10-23 PROCEDURE — 85025 COMPLETE CBC W/AUTO DIFF WBC: CPT | Performed by: OBSTETRICS & GYNECOLOGY

## 2018-10-23 RX ADMIN — HYDROCODONE BITARTRATE AND ACETAMINOPHEN 1 TABLET: 5; 325 TABLET ORAL at 06:22

## 2018-10-23 RX ADMIN — HYDROCODONE BITARTRATE AND ACETAMINOPHEN 1 TABLET: 5; 325 TABLET ORAL at 21:28

## 2018-10-23 RX ADMIN — DOCUSATE SODIUM 100 MG: 100 CAPSULE ORAL at 10:27

## 2018-10-23 RX ADMIN — DOCUSATE SODIUM 100 MG: 100 CAPSULE ORAL at 21:28

## 2018-10-23 RX ADMIN — IBUPROFEN 800 MG: 800 TABLET, FILM COATED ORAL at 21:28

## 2018-10-23 RX ADMIN — IBUPROFEN 800 MG: 800 TABLET, FILM COATED ORAL at 16:13

## 2018-10-23 RX ADMIN — IBUPROFEN 800 MG: 800 TABLET, FILM COATED ORAL at 10:26

## 2018-10-24 VITALS
HEART RATE: 81 BPM | OXYGEN SATURATION: 98 % | DIASTOLIC BLOOD PRESSURE: 57 MMHG | SYSTOLIC BLOOD PRESSURE: 98 MMHG | BODY MASS INDEX: 29.64 KG/M2 | TEMPERATURE: 98 F | WEIGHT: 161.1 LBS | HEIGHT: 62 IN | RESPIRATION RATE: 16 BRPM

## 2018-10-24 LAB
BACTERIA SPEC AEROBE CULT: NO GROWTH
BASOPHILS # BLD AUTO: 0.02 10*3/MM3 (ref 0–0.3)
BASOPHILS NFR BLD AUTO: 0.2 % (ref 0–2)
DEPRECATED RDW RBC AUTO: 43 FL (ref 37–54)
EOSINOPHIL # BLD AUTO: 0.14 10*3/MM3 (ref 0–0.7)
EOSINOPHIL NFR BLD AUTO: 1.3 % (ref 0–5)
ERYTHROCYTE [DISTWIDTH] IN BLOOD BY AUTOMATED COUNT: 14.7 % (ref 11.5–14.5)
HCT VFR BLD AUTO: 32.3 % (ref 37–47)
HGB BLD-MCNC: 9.9 G/DL (ref 12–16)
IMM GRANULOCYTES # BLD: 0.03 10*3/MM3 (ref 0–0.03)
IMM GRANULOCYTES NFR BLD: 0.3 % (ref 0–0.5)
LYMPHOCYTES # BLD AUTO: 2.85 10*3/MM3 (ref 1–3)
LYMPHOCYTES NFR BLD AUTO: 26.5 % (ref 21–51)
MCH RBC QN AUTO: 26 PG (ref 27–33)
MCHC RBC AUTO-ENTMCNC: 30.7 G/DL (ref 33–37)
MCV RBC AUTO: 84.8 FL (ref 80–94)
MONOCYTES # BLD AUTO: 0.61 10*3/MM3 (ref 0.1–0.9)
MONOCYTES NFR BLD AUTO: 5.7 % (ref 0–10)
NEUTROPHILS # BLD AUTO: 7.09 10*3/MM3 (ref 1.4–6.5)
NEUTROPHILS NFR BLD AUTO: 66 % (ref 30–70)
PLATELET # BLD AUTO: 236 10*3/MM3 (ref 130–400)
PMV BLD AUTO: 10.8 FL (ref 6–10)
RBC # BLD AUTO: 3.81 10*6/MM3 (ref 4.2–5.4)
WBC NRBC COR # BLD: 10.74 10*3/MM3 (ref 4.5–12.5)

## 2018-10-24 PROCEDURE — 85025 COMPLETE CBC W/AUTO DIFF WBC: CPT | Performed by: OBSTETRICS & GYNECOLOGY

## 2018-10-24 RX ORDER — IBUPROFEN 600 MG/1
600 TABLET ORAL 3 TIMES DAILY
Qty: 40 TABLET | Refills: 1 | OUTPATIENT
Start: 2018-10-24 | End: 2020-06-01

## 2018-10-24 RX ORDER — FERROUS SULFATE 325(65) MG
325 TABLET ORAL
Qty: 30 TABLET | Refills: 0 | OUTPATIENT
Start: 2018-10-24 | End: 2020-06-01

## 2018-10-24 RX ADMIN — IBUPROFEN 800 MG: 800 TABLET, FILM COATED ORAL at 08:29

## 2018-10-24 RX ADMIN — HYDROCODONE BITARTRATE AND ACETAMINOPHEN 1 TABLET: 5; 325 TABLET ORAL at 05:58

## 2018-10-24 RX ADMIN — DOCUSATE SODIUM 100 MG: 100 CAPSULE ORAL at 08:29

## 2018-10-24 RX ADMIN — ONDANSETRON 4 MG: 4 TABLET, FILM COATED ORAL at 07:36

## 2019-10-17 NOTE — ANESTHESIA PREPROCEDURE EVALUATION
Anesthesia Evaluation     Patient summary reviewed and Nursing notes reviewed   no history of anesthetic complications:  NPO Solid Status: > 8 hours  NPO Liquid Status: > 8 hours     Airway   Mallampati: II  TM distance: >3 FB  Neck ROM: full  no difficulty expected  Dental - normal exam     Pulmonary - normal exam   (+) a smoker Former,   (-) asthma  Cardiovascular - negative cardio ROS and normal exam  Exercise tolerance: good (4-7 METS)    NYHA Classification: I    (-) hypertension, past MI, dysrhythmias, angina      Neuro/Psych  (+) headaches,    (-) seizures  GI/Hepatic/Renal/Endo    (+) obesity,    (-) GERD    Musculoskeletal (-) negative ROS    Abdominal  - normal exam    Bowel sounds: normal.   Substance History - negative use     OB/GYN    (+) Pregnant,         Other - negative ROS                                       Anesthesia Plan    ASA 2     epidural     Anesthetic plan and risks discussed with patient.  Use of blood products discussed with patient  Consented to blood products.     
No

## 2019-12-08 ENCOUNTER — HOSPITAL ENCOUNTER (EMERGENCY)
Facility: HOSPITAL | Age: 24
Discharge: LEFT BEFORE DISCHARGE | End: 2019-12-08
Attending: EMERGENCY MEDICINE | Admitting: EMERGENCY MEDICINE

## 2019-12-08 VITALS
DIASTOLIC BLOOD PRESSURE: 61 MMHG | RESPIRATION RATE: 18 BRPM | TEMPERATURE: 98.1 F | OXYGEN SATURATION: 100 % | BODY MASS INDEX: 29.44 KG/M2 | SYSTOLIC BLOOD PRESSURE: 125 MMHG | WEIGHT: 160 LBS | HEART RATE: 105 BPM | HEIGHT: 62 IN

## 2019-12-08 DIAGNOSIS — R10.2 PELVIC PAIN IN FEMALE: Primary | ICD-10-CM

## 2019-12-08 LAB
B-HCG UR QL: NEGATIVE
BACTERIA UR QL AUTO: ABNORMAL /HPF
BILIRUB UR QL STRIP: NEGATIVE
CLARITY UR: CLEAR
COLOR UR: ABNORMAL
GLUCOSE UR STRIP-MCNC: NEGATIVE MG/DL
HCG SERPL QL: NEGATIVE
HGB UR QL STRIP.AUTO: NEGATIVE
HYALINE CASTS UR QL AUTO: ABNORMAL /LPF
KETONES UR QL STRIP: ABNORMAL
LEUKOCYTE ESTERASE UR QL STRIP.AUTO: ABNORMAL
MUCOUS THREADS URNS QL MICRO: ABNORMAL /HPF
NITRITE UR QL STRIP: NEGATIVE
PH UR STRIP.AUTO: 6 [PH] (ref 5–8)
PROT UR QL STRIP: ABNORMAL
RBC # UR: ABNORMAL /HPF
REF LAB TEST METHOD: ABNORMAL
SP GR UR STRIP: >1.03 (ref 1–1.03)
SQUAMOUS #/AREA URNS HPF: ABNORMAL /HPF
UROBILINOGEN UR QL STRIP: ABNORMAL
WBC UR QL AUTO: ABNORMAL /HPF

## 2019-12-08 PROCEDURE — 25010000002 ORPHENADRINE CITRATE PER 60 MG: Performed by: PHYSICIAN ASSISTANT

## 2019-12-08 PROCEDURE — 99282 EMERGENCY DEPT VISIT SF MDM: CPT

## 2019-12-08 PROCEDURE — 96372 THER/PROPH/DIAG INJ SC/IM: CPT

## 2019-12-08 PROCEDURE — 25010000002 KETOROLAC TROMETHAMINE PER 15 MG: Performed by: PHYSICIAN ASSISTANT

## 2019-12-08 PROCEDURE — 87086 URINE CULTURE/COLONY COUNT: CPT | Performed by: PHYSICIAN ASSISTANT

## 2019-12-08 PROCEDURE — 81025 URINE PREGNANCY TEST: CPT | Performed by: PHYSICIAN ASSISTANT

## 2019-12-08 PROCEDURE — 81001 URINALYSIS AUTO W/SCOPE: CPT | Performed by: PHYSICIAN ASSISTANT

## 2019-12-08 PROCEDURE — 84703 CHORIONIC GONADOTROPIN ASSAY: CPT | Performed by: PHYSICIAN ASSISTANT

## 2019-12-08 RX ORDER — ORPHENADRINE CITRATE 30 MG/ML
60 INJECTION INTRAMUSCULAR; INTRAVENOUS ONCE
Status: COMPLETED | OUTPATIENT
Start: 2019-12-08 | End: 2019-12-08

## 2019-12-08 RX ORDER — KETOROLAC TROMETHAMINE 30 MG/ML
60 INJECTION, SOLUTION INTRAMUSCULAR; INTRAVENOUS ONCE
Status: COMPLETED | OUTPATIENT
Start: 2019-12-08 | End: 2019-12-08

## 2019-12-08 RX ADMIN — KETOROLAC TROMETHAMINE 60 MG: 60 INJECTION, SOLUTION INTRAMUSCULAR at 16:13

## 2019-12-08 RX ADMIN — ORPHENADRINE CITRATE 60 MG: 30 INJECTION INTRAMUSCULAR; INTRAVENOUS at 16:13

## 2019-12-08 NOTE — ED NOTES
"Pt states that her \"bottom is sore\" and wants to walk outside. Pt advised she would need to return to room as soon as possible for the provider to reassess her and she will need her friend to return to sign as her . Pt states she will be back shortly. Pt has no IV present.     Laura Adair RN  12/08/19 1844    "

## 2019-12-08 NOTE — ED PROVIDER NOTES
Subjective   24-year-old female presents to the ER with complaints of lower abdominal pain and back pain.  Patient denies any injury.  Patient denies any dysuria.  Patient admits that she is concerned that she possibly may be pregnant.  Patient states that she had sexual intercourse on November 17.  Patient states she has had some mild spotting.  Patient had birth control implant removed 1 month prior.  Patient denies any nausea or vomiting however does admit to some breast tenderness.          Review of Systems   Constitutional: Negative.  Negative for fever.   HENT: Negative.    Respiratory: Negative.    Cardiovascular: Negative.  Negative for chest pain.   Gastrointestinal: Negative.  Negative for abdominal pain.   Endocrine: Negative.    Genitourinary: Positive for pelvic pain. Negative for dysuria.   Musculoskeletal: Positive for back pain.   Skin: Negative.    Neurological: Negative.    Psychiatric/Behavioral: Negative.    All other systems reviewed and are negative.      Past Medical History:   Diagnosis Date   • Migraine     no meds prescribed   • Urinary tract infection        No Known Allergies    Past Surgical History:   Procedure Laterality Date   • WISDOM TOOTH EXTRACTION      one was removed       Family History   Problem Relation Age of Onset   • Diabetes Paternal Grandfather    • Hypertension Paternal Grandfather    • Hypertension Paternal Grandmother    • Hypertension Maternal Grandmother    • Hypertension Maternal Grandfather        Social History     Socioeconomic History   • Marital status: Single     Spouse name: Not on file   • Number of children: Not on file   • Years of education: Not on file   • Highest education level: Not on file   Tobacco Use   • Smoking status: Former Smoker   • Smokeless tobacco: Never Used   Substance and Sexual Activity   • Alcohol use: No   • Drug use: No   • Sexual activity: Yes     Partners: Male           Objective   Physical Exam   Constitutional: She is oriented  to person, place, and time. She appears well-developed and well-nourished. No distress.   HENT:   Head: Normocephalic and atraumatic.   Right Ear: External ear normal.   Left Ear: External ear normal.   Nose: Nose normal.   Eyes: Conjunctivae are normal.   Neck: Normal range of motion. Neck supple. No JVD present. No tracheal deviation present.   Cardiovascular: Normal rate, regular rhythm and normal heart sounds.   No murmur heard.  Pulmonary/Chest: Effort normal and breath sounds normal. No respiratory distress. She has no wheezes.   Abdominal: Soft. Bowel sounds are normal. There is tenderness in the suprapubic area.   Musculoskeletal: Normal range of motion. She exhibits no edema or deformity.   Neurological: She is alert and oriented to person, place, and time. No cranial nerve deficit.   Skin: Skin is warm and dry. No rash noted. She is not diaphoretic. No erythema. No pallor.   Psychiatric: She has a normal mood and affect. Her behavior is normal. Thought content normal.   Nursing note and vitals reviewed.      Procedures           ED Course  ED Course as of Dec 08 1707   Sun Dec 08, 2019   1702 Went to discuss results with patient.  Patient is no longer in the room.  Appears that patient may have eloped.    [RB]      ED Course User Index  [RB] Darryn Appiah PA                      No data recorded                        MDM  Number of Diagnoses or Management Options  Pelvic pain in female: new and requires workup     Amount and/or Complexity of Data Reviewed  Clinical lab tests: ordered and reviewed    Risk of Complications, Morbidity, and/or Mortality  Presenting problems: low  Diagnostic procedures: low  Management options: low    Patient Progress  Patient progress: other (comment) (Pt eloped. )      Final diagnoses:   Pelvic pain in female              Darryn Appiah PA  12/08/19 5358

## 2019-12-08 NOTE — ED NOTES
Pt has still not returned to room. Pt not found outside of hospital. Provider made aware of elopement.      Laura Adair, RN  12/08/19 4516

## 2019-12-09 LAB — BACTERIA SPEC AEROBE CULT: ABNORMAL

## 2020-05-31 ENCOUNTER — APPOINTMENT (OUTPATIENT)
Dept: GENERAL RADIOLOGY | Facility: HOSPITAL | Age: 25
End: 2020-05-31

## 2020-05-31 ENCOUNTER — HOSPITAL ENCOUNTER (EMERGENCY)
Facility: HOSPITAL | Age: 25
Discharge: HOME | End: 2020-06-01
Admitting: EMERGENCY MEDICINE

## 2020-05-31 DIAGNOSIS — S60.221A CONTUSION OF RIGHT HAND, INITIAL ENCOUNTER: Primary | ICD-10-CM

## 2020-05-31 PROCEDURE — 73130 X-RAY EXAM OF HAND: CPT

## 2020-05-31 PROCEDURE — 99283 EMERGENCY DEPT VISIT LOW MDM: CPT

## 2020-06-01 VITALS
WEIGHT: 153 LBS | SYSTOLIC BLOOD PRESSURE: 95 MMHG | HEART RATE: 76 BPM | OXYGEN SATURATION: 98 % | RESPIRATION RATE: 18 BRPM | DIASTOLIC BLOOD PRESSURE: 65 MMHG | HEIGHT: 62 IN | TEMPERATURE: 98.1 F | BODY MASS INDEX: 28.16 KG/M2

## 2020-06-01 PROCEDURE — 25010000002 TDAP 5-2.5-18.5 LF-MCG/0.5 SUSPENSION: Performed by: NURSE PRACTITIONER

## 2020-06-01 PROCEDURE — 90471 IMMUNIZATION ADMIN: CPT | Performed by: NURSE PRACTITIONER

## 2020-06-01 PROCEDURE — 90715 TDAP VACCINE 7 YRS/> IM: CPT | Performed by: NURSE PRACTITIONER

## 2020-06-01 RX ORDER — DICLOFENAC SODIUM 75 MG/1
75 TABLET, DELAYED RELEASE ORAL 2 TIMES DAILY PRN
Qty: 20 TABLET | Refills: 0 | Status: ON HOLD | OUTPATIENT
Start: 2020-06-01 | End: 2022-02-04

## 2020-06-01 RX ORDER — DICLOFENAC SODIUM 75 MG/1
75 TABLET, DELAYED RELEASE ORAL 2 TIMES DAILY PRN
Qty: 20 TABLET | Refills: 0 | Status: SHIPPED | OUTPATIENT
Start: 2020-06-01 | End: 2020-06-01 | Stop reason: SDUPTHER

## 2020-06-01 RX ORDER — KETOROLAC TROMETHAMINE 10 MG/1
10 TABLET, FILM COATED ORAL ONCE
Status: COMPLETED | OUTPATIENT
Start: 2020-06-01 | End: 2020-06-01

## 2020-06-01 RX ADMIN — KETOROLAC TROMETHAMINE 10 MG: 10 TABLET, FILM COATED ORAL at 00:33

## 2020-06-01 RX ADMIN — TETANUS TOXOID, REDUCED DIPHTHERIA TOXOID AND ACELLULAR PERTUSSIS VACCINE, ADSORBED 0.5 ML: 5; 2.5; 8; 8; 2.5 SUSPENSION INTRAMUSCULAR at 00:13

## 2020-06-01 NOTE — ED NOTES
Pt resting quietly on stretcher with complaints of right hand pain.  Pt AAOx4 with no resp distress noted, respirations even and unlabored.  Pt denies any needs at this time.  Skin unchanged.  Pt family at bedside. Will continue to monitor and follow plan of care.  Bed rails up x2, bed in lowest position, call light in reach.           Jenn Valdez, RN  06/01/20 0103

## 2020-06-01 NOTE — ED PROVIDER NOTES
Subjective     History provided by:  Patient   used: No    Hand Injury   Location:  Hand  Hand location:  R hand  Injury: yes    Mechanism of injury: fall    Fall:     Fall occurred: up stairs.    Impact surface:  Hard floor  Pain details:     Quality:  Throbbing  Handedness:  Right-handed  Foreign body present:  No foreign bodies  Tetanus status:  Out of date  Prior injury to area:  No  Relieved by:  Nothing  Worsened by:  Nothing  Ineffective treatments:  None tried  Associated symptoms: decreased range of motion and swelling    Risk factors: no concern for non-accidental trauma, no known bone disorder, no frequent fractures and no recent illness        Review of Systems   Constitutional: Negative.    HENT: Negative.    Eyes: Negative.    Respiratory: Negative.    Cardiovascular: Negative.    Gastrointestinal: Negative.    Endocrine: Negative.    Musculoskeletal: Negative.    Skin: Positive for wound.   Allergic/Immunologic: Negative.    Neurological: Negative.    Hematological: Negative.    Psychiatric/Behavioral: Negative.    All other systems reviewed and are negative.      Past Medical History:   Diagnosis Date   • Migraine     no meds prescribed   • Urinary tract infection        No Known Allergies    Past Surgical History:   Procedure Laterality Date   • WISDOM TOOTH EXTRACTION      one was removed       Family History   Problem Relation Age of Onset   • Diabetes Paternal Grandfather    • Hypertension Paternal Grandfather    • Hypertension Paternal Grandmother    • Hypertension Maternal Grandmother    • Hypertension Maternal Grandfather        Social History     Socioeconomic History   • Marital status: Single     Spouse name: Not on file   • Number of children: Not on file   • Years of education: Not on file   • Highest education level: Not on file   Tobacco Use   • Smoking status: Former Smoker   • Smokeless tobacco: Never Used   Substance and Sexual Activity   • Alcohol use: No   •  Drug use: No   • Sexual activity: Yes     Partners: Male           Objective   Physical Exam   Constitutional: She is oriented to person, place, and time. She appears well-developed and well-nourished.   HENT:   Head: Normocephalic.   Eyes: Pupils are equal, round, and reactive to light.   Neck: Normal range of motion. Neck supple.   Cardiovascular: Normal rate, regular rhythm, normal heart sounds and intact distal pulses.   Pulmonary/Chest: Effort normal and breath sounds normal.   Abdominal: Soft. Bowel sounds are normal.   Musculoskeletal: She exhibits tenderness.   Tenderness to 4th/ 5th metacarpal , multiple tiny abrasions on right hand. Ecchymosis and swelling noted to 4th/5th metacarpal.   Neurological: She is alert and oriented to person, place, and time.   Skin: Skin is warm. Capillary refill takes less than 2 seconds.   Psychiatric: She has a normal mood and affect. Her behavior is normal. Judgment and thought content normal.   Nursing note and vitals reviewed.      Procedures           ED Course  ED Course as of Jun 01 0053   Mon Jun 01, 2020   0049 IMPRESSION:  Negative right hand.   XR Hand 3+ View Right [KK]      ED Course User Index  [KK] Yazmin Sánchez, FERNANDO                                           MDM  Number of Diagnoses or Management Options  Contusion of right hand, initial encounter: new and requires workup     Amount and/or Complexity of Data Reviewed  Tests in the radiology section of CPT®: ordered and reviewed    Risk of Complications, Morbidity, and/or Mortality  Presenting problems: moderate  Diagnostic procedures: moderate  Management options: moderate    Patient Progress  Patient progress: improved      Final diagnoses:   Contusion of right hand, initial encounter            Yazmin Sánchez APRN  06/01/20 0050       Yazmin Sánchez APRN  06/01/20 0053

## 2020-06-01 NOTE — ED NOTES
Pt right hand cleansed with surgical cleanse and rinsed with water. Pt tolerated well. Wounds covered with two band-aids .     Jenn Valdez RN  06/01/20 0031

## 2021-03-15 ENCOUNTER — BULK ORDERING (OUTPATIENT)
Dept: CASE MANAGEMENT | Facility: OTHER | Age: 26
End: 2021-03-15

## 2021-03-15 DIAGNOSIS — Z23 IMMUNIZATION DUE: ICD-10-CM

## 2021-04-19 ENCOUNTER — HOSPITAL ENCOUNTER (EMERGENCY)
Dept: HOSPITAL 79 - ER1 | Age: 26
LOS: 1 days | Discharge: HOME | End: 2021-04-20
Payer: COMMERCIAL

## 2021-04-19 DIAGNOSIS — O46.91: Primary | ICD-10-CM

## 2021-04-20 LAB
BUN/CREATININE RATIO: 21 (ref 0–10)
HGB BLD-MCNC: 12.7 GM/DL (ref 12.3–15.3)
RED BLOOD COUNT: 4.41 M/UL (ref 4–5.1)
WHITE BLOOD COUNT: 6.6 K/UL (ref 4.5–11)

## 2022-01-26 ENCOUNTER — TRANSCRIBE ORDERS (OUTPATIENT)
Dept: ADMINISTRATIVE | Facility: HOSPITAL | Age: 27
End: 2022-01-26

## 2022-01-26 ENCOUNTER — LAB (OUTPATIENT)
Dept: LAB | Facility: HOSPITAL | Age: 27
End: 2022-01-26

## 2022-01-26 DIAGNOSIS — N91.2 ABSENCE OF MENSTRUATION: Primary | ICD-10-CM

## 2022-01-26 DIAGNOSIS — N91.2 ABSENCE OF MENSTRUATION: ICD-10-CM

## 2022-01-26 LAB
HCG INTACT+B SERPL-ACNC: 191.9 MIU/ML
PROGEST SERPL-MCNC: 16.1 NG/ML

## 2022-01-26 PROCEDURE — 84144 ASSAY OF PROGESTERONE: CPT

## 2022-01-26 PROCEDURE — 84702 CHORIONIC GONADOTROPIN TEST: CPT

## 2022-01-26 PROCEDURE — 36415 COLL VENOUS BLD VENIPUNCTURE: CPT

## 2022-01-28 ENCOUNTER — LAB (OUTPATIENT)
Dept: LAB | Facility: HOSPITAL | Age: 27
End: 2022-01-28

## 2022-01-28 ENCOUNTER — TRANSCRIBE ORDERS (OUTPATIENT)
Dept: ADMINISTRATIVE | Facility: HOSPITAL | Age: 27
End: 2022-01-28

## 2022-01-28 DIAGNOSIS — N91.2 ABSENCE OF MENSTRUATION: Primary | ICD-10-CM

## 2022-01-28 DIAGNOSIS — N91.2 ABSENCE OF MENSTRUATION: ICD-10-CM

## 2022-01-28 LAB — HCG INTACT+B SERPL-ACNC: 365.7 MIU/ML

## 2022-01-28 PROCEDURE — 84702 CHORIONIC GONADOTROPIN TEST: CPT

## 2022-01-28 PROCEDURE — 36415 COLL VENOUS BLD VENIPUNCTURE: CPT

## 2022-01-31 ENCOUNTER — HOSPITAL ENCOUNTER (OUTPATIENT)
Dept: HOSPITAL 79 - LAB | Age: 27
End: 2022-01-31
Attending: OBSTETRICS & GYNECOLOGY
Payer: COMMERCIAL

## 2022-01-31 DIAGNOSIS — Z32.01: Primary | ICD-10-CM

## 2022-02-04 ENCOUNTER — HOSPITAL ENCOUNTER (OUTPATIENT)
Facility: HOSPITAL | Age: 27
Discharge: HOME | End: 2022-02-04
Attending: OBSTETRICS & GYNECOLOGY | Admitting: OBSTETRICS & GYNECOLOGY

## 2022-02-04 VITALS
HEART RATE: 98 BPM | WEIGHT: 131.04 LBS | SYSTOLIC BLOOD PRESSURE: 125 MMHG | OXYGEN SATURATION: 98 % | DIASTOLIC BLOOD PRESSURE: 79 MMHG | RESPIRATION RATE: 18 BRPM | BODY MASS INDEX: 24.11 KG/M2 | TEMPERATURE: 98.1 F | HEIGHT: 62 IN

## 2022-02-04 PROCEDURE — G0378 HOSPITAL OBSERVATION PER HR: HCPCS

## 2022-02-04 PROCEDURE — 25010000002 METHOTREXATE SODIUM SOLUTION: Performed by: OBSTETRICS & GYNECOLOGY

## 2022-02-04 PROCEDURE — 96372 THER/PROPH/DIAG INJ SC/IM: CPT

## 2022-02-04 RX ORDER — METHOTREXATE SODIUM 25 MG/ML
50 INJECTION, SOLUTION INTRA-ARTERIAL; INTRAMUSCULAR; INTRAVENOUS ONCE
Status: COMPLETED | OUTPATIENT
Start: 2022-02-04 | End: 2022-02-04

## 2022-02-04 RX ORDER — PRENATAL VIT/IRON FUM/FOLIC AC 27MG-0.8MG
1 TABLET ORAL DAILY
Status: CANCELLED | OUTPATIENT
Start: 2022-02-04

## 2022-02-04 RX ORDER — PROGESTERONE 100 MG/1
200 CAPSULE ORAL DAILY
Status: CANCELLED | OUTPATIENT
Start: 2022-02-04

## 2022-02-04 RX ORDER — PROGESTERONE 200 MG/1
200 CAPSULE ORAL DAILY
Status: ON HOLD | COMMUNITY
End: 2022-02-09

## 2022-02-04 RX ORDER — PRENATAL VIT/IRON FUM/FOLIC AC 27MG-0.8MG
1 TABLET ORAL DAILY
Status: ON HOLD | COMMUNITY
End: 2022-02-09

## 2022-02-04 RX ADMIN — METHOTREXATE 80 MG: 25 SOLUTION INTRA-ARTERIAL; INTRAMUSCULAR; INTRATHECAL; INTRAVENOUS at 16:38

## 2022-02-04 NOTE — DISCHARGE INSTRUCTIONS
Keep your appointment with Dr Morrow on Monday. Go to the ER with severe pain, excessive bleeding, or any other concerning symptoms.

## 2022-02-04 NOTE — PLAN OF CARE
Goal Outcome Evaluation:  Plan of Care Reviewed With: patient        Progress: improving   Patient has received full dose of medication split into 2 doses. Currently waiting required 20 minutes post injection before discharge.

## 2022-02-05 PROBLEM — O00.90 ECTOPIC PREGNANCY WITHOUT INTRAUTERINE PREGNANCY: Status: ACTIVE | Noted: 2022-02-05

## 2022-02-05 NOTE — H&P
Chief complaint Ecotpic pregnancy    History of Present Illness: Patient is a 26 y.o. female who presents for Methotrexate.       Past Medical History:   Diagnosis Date   • Migraine     no meds prescribed   • Urinary tract infection        Past Surgical History:   Procedure Laterality Date   • WISDOM TOOTH EXTRACTION      one was removed       Family History   Problem Relation Age of Onset   • Diabetes Paternal Grandfather    • Hypertension Paternal Grandfather    • Hypertension Paternal Grandmother    • Hypertension Maternal Grandmother    • Hypertension Maternal Grandfather        Social History     Tobacco Use   • Smoking status: Former Smoker   • Smokeless tobacco: Never Used   Substance Use Topics   • Alcohol use: No   • Drug use: No       No medications prior to admission.       Allergies:  Patient has no known allergies.      Vital Signs  Temp:  [98.1 °F (36.7 °C)] 98.1 °F (36.7 °C)  Heart Rate:  [98] 98  Resp:  [18] 18  BP: (125)/(79) 125/79      Review of Systems    The following systems were reviewed and negative;  ENT, respiratory, cardiovascular, gastrointestinal, genitourinary, breast, endocrine and allergies / immunologic.      Physical Exam:      General Appearance:    Alert, cooperative, in no acute distress   Head:    Normocephalic, without obvious abnormality, atraumatic   Eyes:            Lids and lashes normal, conjunctivae and sclerae normal, no   icterus, no pallor, corneas clear, PERRLA   Ears:    Ears appear intact with no abnormalities noted   Throat:   No oral lesions, no thrush, oral mucosa moist   Neck:   No adenopathy, supple, trachea midline, no thyromegaly, no     carotid bruit, no JVD   Back:     No kyphosis present, no scoliosis present, no skin lesions,       erythema or scars, no tenderness to percussion or                   palpation,   range of motion normal   Lungs:     Clear to auscultation,respirations regular, even and                   unlabored    Heart:    Regular rhythm  and normal rate, normal S1 and S2, no            murmur, no gallop, no rub, no click   Breast Exam:    Deferred   Abdomen:     Normal bowel sounds, no masses, no organomegaly, soft        non-tender, non-distended, no guarding, no rebound                 tenderness   Genitalia:    Deferred   Extremities:   Moves all extremities well, no edema, no cyanosis, no              redness   Pulses:   Pulses palpable and equal bilaterally   Skin:   No bleeding, bruising or rash   Lymph nodes:   No palpable adenopathy   Neurologic:   Cranial nerves 2 - 12 grossly intact, sensation intact, DTR        present and equal bilaterally         Assessment: Patient is a 26 y.o. female who presents with ectopic pregnancy. Patient was seen in the office and informed of the need for Methotrexate and hospital admission. Patient agrees to Methotrexate but refuses hospital admission despite the risk of ruptured ectopic, excessive bleeding, loss of tube and even death.     Plan of Care: Recommend hospital admission and administration of Methotrexate. Recommend repeat Beta HCG levels. Patient is leaving AMA.      aFraz Morrow III, MD  02/05/22  11:40 EST

## 2022-02-08 ENCOUNTER — HOSPITAL ENCOUNTER (INPATIENT)
Facility: HOSPITAL | Age: 27
LOS: 1 days | Discharge: HOME | End: 2022-02-09
Attending: STUDENT IN AN ORGANIZED HEALTH CARE EDUCATION/TRAINING PROGRAM | Admitting: OBSTETRICS & GYNECOLOGY

## 2022-02-08 DIAGNOSIS — O00.101 RIGHT TUBAL PREGNANCY WITHOUT INTRAUTERINE PREGNANCY: ICD-10-CM

## 2022-02-08 DIAGNOSIS — O00.90 ECTOPIC PREGNANCY, UNSPECIFIED LOCATION, UNSPECIFIED WHETHER INTRAUTERINE PREGNANCY PRESENT: Primary | ICD-10-CM

## 2022-02-08 DIAGNOSIS — O00.109 TUBAL PREGNANCY WITHOUT INTRAUTERINE PREGNANCY, UNSPECIFIED LATERALITY: ICD-10-CM

## 2022-02-08 PROCEDURE — 99284 EMERGENCY DEPT VISIT MOD MDM: CPT

## 2022-02-09 ENCOUNTER — APPOINTMENT (OUTPATIENT)
Dept: ULTRASOUND IMAGING | Facility: HOSPITAL | Age: 27
End: 2022-02-09

## 2022-02-09 ENCOUNTER — ANESTHESIA EVENT (OUTPATIENT)
Dept: PERIOP | Facility: HOSPITAL | Age: 27
End: 2022-02-09

## 2022-02-09 ENCOUNTER — HOSPITAL ENCOUNTER (OUTPATIENT)
Facility: HOSPITAL | Age: 27
Setting detail: SURGERY ADMIT
End: 2022-02-09
Attending: OBSTETRICS & GYNECOLOGY | Admitting: OBSTETRICS & GYNECOLOGY

## 2022-02-09 ENCOUNTER — APPOINTMENT (OUTPATIENT)
Dept: GENERAL RADIOLOGY | Facility: HOSPITAL | Age: 27
End: 2022-02-09

## 2022-02-09 ENCOUNTER — ANESTHESIA (OUTPATIENT)
Dept: PERIOP | Facility: HOSPITAL | Age: 27
End: 2022-02-09

## 2022-02-09 VITALS
HEART RATE: 74 BPM | TEMPERATURE: 98 F | HEIGHT: 62 IN | RESPIRATION RATE: 16 BRPM | DIASTOLIC BLOOD PRESSURE: 62 MMHG | BODY MASS INDEX: 24.66 KG/M2 | WEIGHT: 134 LBS | SYSTOLIC BLOOD PRESSURE: 104 MMHG | OXYGEN SATURATION: 100 %

## 2022-02-09 PROBLEM — O00.90 ECTOPIC PREGNANCY: Status: ACTIVE | Noted: 2022-02-09

## 2022-02-09 LAB
ABO GROUP BLD: NORMAL
ALBUMIN SERPL-MCNC: 4.41 G/DL (ref 3.5–5.2)
ALBUMIN/GLOB SERPL: 1.8 G/DL
ALP SERPL-CCNC: 56 U/L (ref 39–117)
ALT SERPL W P-5'-P-CCNC: 19 U/L (ref 1–33)
ANION GAP SERPL CALCULATED.3IONS-SCNC: 10.7 MMOL/L (ref 5–15)
AST SERPL-CCNC: 18 U/L (ref 1–32)
BACTERIA UR QL AUTO: ABNORMAL /HPF
BASOPHILS # BLD AUTO: 0.02 10*3/MM3 (ref 0–0.2)
BASOPHILS # BLD AUTO: 0.03 10*3/MM3 (ref 0–0.2)
BASOPHILS NFR BLD AUTO: 0.3 % (ref 0–1.5)
BASOPHILS NFR BLD AUTO: 0.4 % (ref 0–1.5)
BILIRUB SERPL-MCNC: 0.2 MG/DL (ref 0–1.2)
BILIRUB UR QL STRIP: NEGATIVE
BLD GP AB SCN SERPL QL: NEGATIVE
BUN SERPL-MCNC: 10 MG/DL (ref 6–20)
BUN/CREAT SERPL: 14.7 (ref 7–25)
CALCIUM SPEC-SCNC: 9.2 MG/DL (ref 8.6–10.5)
CHLORIDE SERPL-SCNC: 108 MMOL/L (ref 98–107)
CLARITY UR: CLEAR
CO2 SERPL-SCNC: 22.3 MMOL/L (ref 22–29)
COLOR UR: YELLOW
CREAT SERPL-MCNC: 0.68 MG/DL (ref 0.57–1)
DEPRECATED RDW RBC AUTO: 39.2 FL (ref 37–54)
DEPRECATED RDW RBC AUTO: 40.2 FL (ref 37–54)
EOSINOPHIL # BLD AUTO: 0.02 10*3/MM3 (ref 0–0.4)
EOSINOPHIL # BLD AUTO: 0.05 10*3/MM3 (ref 0–0.4)
EOSINOPHIL NFR BLD AUTO: 0.3 % (ref 0.3–6.2)
EOSINOPHIL NFR BLD AUTO: 0.6 % (ref 0.3–6.2)
ERYTHROCYTE [DISTWIDTH] IN BLOOD BY AUTOMATED COUNT: 12.4 % (ref 12.3–15.4)
ERYTHROCYTE [DISTWIDTH] IN BLOOD BY AUTOMATED COUNT: 12.5 % (ref 12.3–15.4)
FLUAV SUBTYP SPEC NAA+PROBE: NOT DETECTED
FLUBV RNA ISLT QL NAA+PROBE: NOT DETECTED
GFR SERPL CREATININE-BSD FRML MDRD: 105 ML/MIN/1.73
GLOBULIN UR ELPH-MCNC: 2.5 GM/DL
GLUCOSE SERPL-MCNC: 115 MG/DL (ref 65–99)
GLUCOSE UR STRIP-MCNC: NEGATIVE MG/DL
HCG INTACT+B SERPL-ACNC: 1327 MIU/ML
HCT VFR BLD AUTO: 30.6 % (ref 34–46.6)
HCT VFR BLD AUTO: 33.3 % (ref 34–46.6)
HGB BLD-MCNC: 10 G/DL (ref 12–15.9)
HGB BLD-MCNC: 11.2 G/DL (ref 12–15.9)
HGB UR QL STRIP.AUTO: ABNORMAL
HOLD SPECIMEN: NORMAL
HOLD SPECIMEN: NORMAL
HYALINE CASTS UR QL AUTO: ABNORMAL /LPF
IMM GRANULOCYTES # BLD AUTO: 0.02 10*3/MM3 (ref 0–0.05)
IMM GRANULOCYTES # BLD AUTO: 0.02 10*3/MM3 (ref 0–0.05)
IMM GRANULOCYTES NFR BLD AUTO: 0.2 % (ref 0–0.5)
IMM GRANULOCYTES NFR BLD AUTO: 0.3 % (ref 0–0.5)
KETONES UR QL STRIP: NEGATIVE
LEUKOCYTE ESTERASE UR QL STRIP.AUTO: ABNORMAL
LYMPHOCYTES # BLD AUTO: 1.73 10*3/MM3 (ref 0.7–3.1)
LYMPHOCYTES # BLD AUTO: 2.9 10*3/MM3 (ref 0.7–3.1)
LYMPHOCYTES NFR BLD AUTO: 21.3 % (ref 19.6–45.3)
LYMPHOCYTES NFR BLD AUTO: 37.9 % (ref 19.6–45.3)
MCH RBC QN AUTO: 28.8 PG (ref 26.6–33)
MCH RBC QN AUTO: 29.2 PG (ref 26.6–33)
MCHC RBC AUTO-ENTMCNC: 32.7 G/DL (ref 31.5–35.7)
MCHC RBC AUTO-ENTMCNC: 33.6 G/DL (ref 31.5–35.7)
MCV RBC AUTO: 86.7 FL (ref 79–97)
MCV RBC AUTO: 88.2 FL (ref 79–97)
MONOCYTES # BLD AUTO: 0.35 10*3/MM3 (ref 0.1–0.9)
MONOCYTES # BLD AUTO: 0.38 10*3/MM3 (ref 0.1–0.9)
MONOCYTES NFR BLD AUTO: 4.3 % (ref 5–12)
MONOCYTES NFR BLD AUTO: 5 % (ref 5–12)
NEUTROPHILS NFR BLD AUTO: 4.31 10*3/MM3 (ref 1.7–7)
NEUTROPHILS NFR BLD AUTO: 5.95 10*3/MM3 (ref 1.7–7)
NEUTROPHILS NFR BLD AUTO: 56.2 % (ref 42.7–76)
NEUTROPHILS NFR BLD AUTO: 73.2 % (ref 42.7–76)
NITRITE UR QL STRIP: NEGATIVE
NRBC BLD AUTO-RTO: 0 /100 WBC (ref 0–0.2)
NRBC BLD AUTO-RTO: 0 /100 WBC (ref 0–0.2)
NUMBER OF DOSES: NORMAL
PH UR STRIP.AUTO: 5.5 [PH] (ref 5–8)
PLATELET # BLD AUTO: 207 10*3/MM3 (ref 140–450)
PLATELET # BLD AUTO: 224 10*3/MM3 (ref 140–450)
PMV BLD AUTO: 10.2 FL (ref 6–12)
PMV BLD AUTO: 10.5 FL (ref 6–12)
POTASSIUM SERPL-SCNC: 3.6 MMOL/L (ref 3.5–5.2)
PROT SERPL-MCNC: 6.9 G/DL (ref 6–8.5)
PROT UR QL STRIP: NEGATIVE
RBC # BLD AUTO: 3.47 10*6/MM3 (ref 3.77–5.28)
RBC # BLD AUTO: 3.84 10*6/MM3 (ref 3.77–5.28)
RBC # UR STRIP: ABNORMAL /HPF
REF LAB TEST METHOD: ABNORMAL
RH BLD: POSITIVE
SARS-COV-2 RNA PNL SPEC NAA+PROBE: NOT DETECTED
SODIUM SERPL-SCNC: 141 MMOL/L (ref 136–145)
SP GR UR STRIP: 1.02 (ref 1–1.03)
SQUAMOUS #/AREA URNS HPF: ABNORMAL /HPF
UROBILINOGEN UR QL STRIP: ABNORMAL
WBC # UR STRIP: ABNORMAL /HPF
WBC NRBC COR # BLD: 7.65 10*3/MM3 (ref 3.4–10.8)
WBC NRBC COR # BLD: 8.13 10*3/MM3 (ref 3.4–10.8)
WHOLE BLOOD HOLD SPECIMEN: NORMAL
WHOLE BLOOD HOLD SPECIMEN: NORMAL

## 2022-02-09 PROCEDURE — 25010000002 FENTANYL CITRATE (PF) 50 MCG/ML SOLUTION: Performed by: NURSE ANESTHETIST, CERTIFIED REGISTERED

## 2022-02-09 PROCEDURE — 25010000002 CEFTRIAXONE PER 250 MG: Performed by: STUDENT IN AN ORGANIZED HEALTH CARE EDUCATION/TRAINING PROGRAM

## 2022-02-09 PROCEDURE — 76817 TRANSVAGINAL US OBSTETRIC: CPT

## 2022-02-09 PROCEDURE — 86901 BLOOD TYPING SEROLOGIC RH(D): CPT | Performed by: PHYSICIAN ASSISTANT

## 2022-02-09 PROCEDURE — 85025 COMPLETE CBC W/AUTO DIFF WBC: CPT | Performed by: OBSTETRICS & GYNECOLOGY

## 2022-02-09 PROCEDURE — 25010000002 ONDANSETRON PER 1 MG: Performed by: NURSE ANESTHETIST, CERTIFIED REGISTERED

## 2022-02-09 PROCEDURE — 80053 COMPREHEN METABOLIC PANEL: CPT | Performed by: PHYSICIAN ASSISTANT

## 2022-02-09 PROCEDURE — 87636 SARSCOV2 & INF A&B AMP PRB: CPT | Performed by: STUDENT IN AN ORGANIZED HEALTH CARE EDUCATION/TRAINING PROGRAM

## 2022-02-09 PROCEDURE — 0UT54ZZ RESECTION OF RIGHT FALLOPIAN TUBE, PERCUTANEOUS ENDOSCOPIC APPROACH: ICD-10-PCS | Performed by: OBSTETRICS & GYNECOLOGY

## 2022-02-09 PROCEDURE — 88305 TISSUE EXAM BY PATHOLOGIST: CPT | Performed by: OBSTETRICS & GYNECOLOGY

## 2022-02-09 PROCEDURE — 25010000002 MIDAZOLAM PER 1 MG: Performed by: NURSE ANESTHETIST, CERTIFIED REGISTERED

## 2022-02-09 PROCEDURE — 81001 URINALYSIS AUTO W/SCOPE: CPT | Performed by: PHYSICIAN ASSISTANT

## 2022-02-09 PROCEDURE — 86900 BLOOD TYPING SEROLOGIC ABO: CPT | Performed by: PHYSICIAN ASSISTANT

## 2022-02-09 PROCEDURE — 76801 OB US < 14 WKS SINGLE FETUS: CPT

## 2022-02-09 PROCEDURE — 25010000002 PROPOFOL 10 MG/ML EMULSION: Performed by: NURSE ANESTHETIST, CERTIFIED REGISTERED

## 2022-02-09 PROCEDURE — 25010000002 KETOROLAC TROMETHAMINE PER 15 MG: Performed by: OBSTETRICS & GYNECOLOGY

## 2022-02-09 PROCEDURE — 86850 RBC ANTIBODY SCREEN: CPT | Performed by: PHYSICIAN ASSISTANT

## 2022-02-09 PROCEDURE — 25010000002 CEFOXITIN PER 1 G: Performed by: OBSTETRICS & GYNECOLOGY

## 2022-02-09 PROCEDURE — 25010000002 NEOSTIGMINE 10 MG/10ML SOLUTION: Performed by: NURSE ANESTHETIST, CERTIFIED REGISTERED

## 2022-02-09 PROCEDURE — 85025 COMPLETE CBC W/AUTO DIFF WBC: CPT | Performed by: PHYSICIAN ASSISTANT

## 2022-02-09 PROCEDURE — 84702 CHORIONIC GONADOTROPIN TEST: CPT | Performed by: PHYSICIAN ASSISTANT

## 2022-02-09 RX ORDER — BUPIVACAINE HYDROCHLORIDE AND EPINEPHRINE 5; 5 MG/ML; UG/ML
INJECTION, SOLUTION PERINEURAL AS NEEDED
Status: DISCONTINUED | OUTPATIENT
Start: 2022-02-09 | End: 2022-02-09 | Stop reason: HOSPADM

## 2022-02-09 RX ORDER — DROPERIDOL 2.5 MG/ML
0.62 INJECTION, SOLUTION INTRAMUSCULAR; INTRAVENOUS ONCE AS NEEDED
Status: DISCONTINUED | OUTPATIENT
Start: 2022-02-09 | End: 2022-02-09 | Stop reason: HOSPADM

## 2022-02-09 RX ORDER — KETOROLAC TROMETHAMINE 30 MG/ML
30 INJECTION, SOLUTION INTRAMUSCULAR; INTRAVENOUS EVERY 6 HOURS PRN
Status: DISCONTINUED | OUTPATIENT
Start: 2022-02-09 | End: 2022-02-09 | Stop reason: HOSPADM

## 2022-02-09 RX ORDER — PROPOFOL 10 MG/ML
VIAL (ML) INTRAVENOUS AS NEEDED
Status: DISCONTINUED | OUTPATIENT
Start: 2022-02-09 | End: 2022-02-09 | Stop reason: SURG

## 2022-02-09 RX ORDER — OXYCODONE HYDROCHLORIDE AND ACETAMINOPHEN 5; 325 MG/1; MG/1
1 TABLET ORAL ONCE AS NEEDED
Status: DISCONTINUED | OUTPATIENT
Start: 2022-02-09 | End: 2022-02-09 | Stop reason: HOSPADM

## 2022-02-09 RX ORDER — SODIUM CHLORIDE, SODIUM LACTATE, POTASSIUM CHLORIDE, CALCIUM CHLORIDE 600; 310; 30; 20 MG/100ML; MG/100ML; MG/100ML; MG/100ML
100 INJECTION, SOLUTION INTRAVENOUS ONCE AS NEEDED
Status: DISCONTINUED | OUTPATIENT
Start: 2022-02-09 | End: 2022-02-09 | Stop reason: HOSPADM

## 2022-02-09 RX ORDER — SODIUM CHLORIDE 0.9 % (FLUSH) 0.9 %
10 SYRINGE (ML) INJECTION AS NEEDED
Status: DISCONTINUED | OUTPATIENT
Start: 2022-02-09 | End: 2022-02-09 | Stop reason: HOSPADM

## 2022-02-09 RX ORDER — HYDROMORPHONE HYDROCHLORIDE 1 MG/ML
0.5 INJECTION, SOLUTION INTRAMUSCULAR; INTRAVENOUS; SUBCUTANEOUS
Status: DISCONTINUED | OUTPATIENT
Start: 2022-02-09 | End: 2022-02-09 | Stop reason: HOSPADM

## 2022-02-09 RX ORDER — CALCIUM CARBONATE 200(500)MG
1 TABLET,CHEWABLE ORAL 3 TIMES DAILY PRN
Status: DISCONTINUED | OUTPATIENT
Start: 2022-02-09 | End: 2022-02-09 | Stop reason: HOSPADM

## 2022-02-09 RX ORDER — NEOSTIGMINE METHYLSULFATE 1 MG/ML
INJECTION, SOLUTION INTRAVENOUS AS NEEDED
Status: DISCONTINUED | OUTPATIENT
Start: 2022-02-09 | End: 2022-02-09 | Stop reason: SURG

## 2022-02-09 RX ORDER — MAGNESIUM HYDROXIDE 1200 MG/15ML
LIQUID ORAL AS NEEDED
Status: DISCONTINUED | OUTPATIENT
Start: 2022-02-09 | End: 2022-02-09 | Stop reason: HOSPADM

## 2022-02-09 RX ORDER — SODIUM CHLORIDE, SODIUM LACTATE, POTASSIUM CHLORIDE, CALCIUM CHLORIDE 600; 310; 30; 20 MG/100ML; MG/100ML; MG/100ML; MG/100ML
125 INJECTION, SOLUTION INTRAVENOUS CONTINUOUS
Status: DISCONTINUED | OUTPATIENT
Start: 2022-02-09 | End: 2022-02-09 | Stop reason: HOSPADM

## 2022-02-09 RX ORDER — SODIUM CHLORIDE 0.9 % (FLUSH) 0.9 %
3 SYRINGE (ML) INJECTION EVERY 12 HOURS SCHEDULED
Status: DISCONTINUED | OUTPATIENT
Start: 2022-02-09 | End: 2022-02-09 | Stop reason: HOSPADM

## 2022-02-09 RX ORDER — IBUPROFEN 600 MG/1
600 TABLET ORAL EVERY 6 HOURS PRN
Qty: 30 TABLET | Refills: 0 | Status: SHIPPED | OUTPATIENT
Start: 2022-02-09 | End: 2022-03-11

## 2022-02-09 RX ORDER — FENTANYL CITRATE 50 UG/ML
50 INJECTION, SOLUTION INTRAMUSCULAR; INTRAVENOUS
Status: DISCONTINUED | OUTPATIENT
Start: 2022-02-09 | End: 2022-02-09 | Stop reason: HOSPADM

## 2022-02-09 RX ORDER — ROCURONIUM BROMIDE 10 MG/ML
INJECTION, SOLUTION INTRAVENOUS AS NEEDED
Status: DISCONTINUED | OUTPATIENT
Start: 2022-02-09 | End: 2022-02-09 | Stop reason: SURG

## 2022-02-09 RX ORDER — FAMOTIDINE 10 MG/ML
INJECTION, SOLUTION INTRAVENOUS AS NEEDED
Status: DISCONTINUED | OUTPATIENT
Start: 2022-02-09 | End: 2022-02-09 | Stop reason: SURG

## 2022-02-09 RX ORDER — SODIUM CHLORIDE, SODIUM LACTATE, POTASSIUM CHLORIDE, CALCIUM CHLORIDE 600; 310; 30; 20 MG/100ML; MG/100ML; MG/100ML; MG/100ML
125 INJECTION, SOLUTION INTRAVENOUS ONCE
Status: COMPLETED | OUTPATIENT
Start: 2022-02-09 | End: 2022-02-09

## 2022-02-09 RX ORDER — SODIUM CHLORIDE 0.9 % (FLUSH) 0.9 %
10 SYRINGE (ML) INJECTION EVERY 12 HOURS SCHEDULED
Status: DISCONTINUED | OUTPATIENT
Start: 2022-02-09 | End: 2022-02-09 | Stop reason: HOSPADM

## 2022-02-09 RX ORDER — GLYCOPYRROLATE 0.2 MG/ML
INJECTION INTRAMUSCULAR; INTRAVENOUS AS NEEDED
Status: DISCONTINUED | OUTPATIENT
Start: 2022-02-09 | End: 2022-02-09 | Stop reason: SURG

## 2022-02-09 RX ORDER — NITROFURANTOIN 25; 75 MG/1; MG/1
100 CAPSULE ORAL 2 TIMES DAILY
Qty: 6 CAPSULE | Refills: 0 | Status: SHIPPED | OUTPATIENT
Start: 2022-02-09

## 2022-02-09 RX ORDER — ONDANSETRON 2 MG/ML
INJECTION INTRAMUSCULAR; INTRAVENOUS AS NEEDED
Status: DISCONTINUED | OUTPATIENT
Start: 2022-02-09 | End: 2022-02-09 | Stop reason: SURG

## 2022-02-09 RX ORDER — MIDAZOLAM HYDROCHLORIDE 1 MG/ML
1 INJECTION INTRAMUSCULAR; INTRAVENOUS
Status: DISCONTINUED | OUTPATIENT
Start: 2022-02-09 | End: 2022-02-09 | Stop reason: HOSPADM

## 2022-02-09 RX ORDER — HYDROCODONE BITARTRATE AND ACETAMINOPHEN 5; 325 MG/1; MG/1
1 TABLET ORAL EVERY 4 HOURS PRN
Qty: 10 TABLET | Refills: 0 | Status: SHIPPED | OUTPATIENT
Start: 2022-02-09

## 2022-02-09 RX ORDER — LIDOCAINE HYDROCHLORIDE 20 MG/ML
INJECTION, SOLUTION EPIDURAL; INFILTRATION; INTRACAUDAL; PERINEURAL AS NEEDED
Status: DISCONTINUED | OUTPATIENT
Start: 2022-02-09 | End: 2022-02-09 | Stop reason: SURG

## 2022-02-09 RX ORDER — MEPERIDINE HYDROCHLORIDE 25 MG/ML
12.5 INJECTION INTRAMUSCULAR; INTRAVENOUS; SUBCUTANEOUS
Status: DISCONTINUED | OUTPATIENT
Start: 2022-02-09 | End: 2022-02-09 | Stop reason: HOSPADM

## 2022-02-09 RX ORDER — ONDANSETRON 2 MG/ML
4 INJECTION INTRAMUSCULAR; INTRAVENOUS AS NEEDED
Status: DISCONTINUED | OUTPATIENT
Start: 2022-02-09 | End: 2022-02-09 | Stop reason: HOSPADM

## 2022-02-09 RX ORDER — MIDAZOLAM HYDROCHLORIDE 1 MG/ML
INJECTION INTRAMUSCULAR; INTRAVENOUS AS NEEDED
Status: DISCONTINUED | OUTPATIENT
Start: 2022-02-09 | End: 2022-02-09 | Stop reason: SURG

## 2022-02-09 RX ORDER — FENTANYL CITRATE 50 UG/ML
INJECTION, SOLUTION INTRAMUSCULAR; INTRAVENOUS AS NEEDED
Status: DISCONTINUED | OUTPATIENT
Start: 2022-02-09 | End: 2022-02-09 | Stop reason: SURG

## 2022-02-09 RX ORDER — BUPIVACAINE HYDROCHLORIDE 5 MG/ML
INJECTION, SOLUTION PERINEURAL AS NEEDED
Status: DISCONTINUED | OUTPATIENT
Start: 2022-02-09 | End: 2022-02-09 | Stop reason: HOSPADM

## 2022-02-09 RX ORDER — IPRATROPIUM BROMIDE AND ALBUTEROL SULFATE 2.5; .5 MG/3ML; MG/3ML
3 SOLUTION RESPIRATORY (INHALATION) ONCE AS NEEDED
Status: DISCONTINUED | OUTPATIENT
Start: 2022-02-09 | End: 2022-02-09 | Stop reason: HOSPADM

## 2022-02-09 RX ADMIN — LIDOCAINE HYDROCHLORIDE 100 MG: 20 INJECTION, SOLUTION EPIDURAL; INFILTRATION; INTRACAUDAL; PERINEURAL at 09:30

## 2022-02-09 RX ADMIN — SODIUM CHLORIDE, POTASSIUM CHLORIDE, SODIUM LACTATE AND CALCIUM CHLORIDE 125 ML/HR: 600; 310; 30; 20 INJECTION, SOLUTION INTRAVENOUS at 09:14

## 2022-02-09 RX ADMIN — KETOROLAC TROMETHAMINE 30 MG: 30 INJECTION, SOLUTION INTRAMUSCULAR; INTRAVENOUS at 03:45

## 2022-02-09 RX ADMIN — PROPOFOL 150 MG: 10 INJECTION, EMULSION INTRAVENOUS at 09:30

## 2022-02-09 RX ADMIN — CALCIUM CARBONATE 1 TABLET: 500 TABLET, CHEWABLE ORAL at 13:09

## 2022-02-09 RX ADMIN — SODIUM CHLORIDE, PRESERVATIVE FREE 10 ML: 5 INJECTION INTRAVENOUS at 08:00

## 2022-02-09 RX ADMIN — SODIUM CHLORIDE, POTASSIUM CHLORIDE, SODIUM LACTATE AND CALCIUM CHLORIDE: 600; 310; 30; 20 INJECTION, SOLUTION INTRAVENOUS at 09:25

## 2022-02-09 RX ADMIN — NEOSTIGMINE 5 MG: 1 INJECTION INTRAVENOUS at 09:59

## 2022-02-09 RX ADMIN — CEFTRIAXONE 1 G: 1 INJECTION, POWDER, FOR SOLUTION INTRAMUSCULAR; INTRAVENOUS at 01:29

## 2022-02-09 RX ADMIN — FENTANYL CITRATE 50 MCG: 50 INJECTION INTRAMUSCULAR; INTRAVENOUS at 09:46

## 2022-02-09 RX ADMIN — SODIUM CHLORIDE 1000 ML: 9 INJECTION, SOLUTION INTRAVENOUS at 01:28

## 2022-02-09 RX ADMIN — CEFOXITIN 2 G: 1 INJECTION, POWDER, FOR SOLUTION INTRAVENOUS at 09:25

## 2022-02-09 RX ADMIN — FAMOTIDINE 20 MG: 10 INJECTION INTRAVENOUS at 09:25

## 2022-02-09 RX ADMIN — MIDAZOLAM 2 MG: 1 INJECTION INTRAMUSCULAR; INTRAVENOUS at 09:25

## 2022-02-09 RX ADMIN — GLYCOPYRROLATE 0.8 MG: 0.2 INJECTION INTRAMUSCULAR; INTRAVENOUS at 09:59

## 2022-02-09 RX ADMIN — FENTANYL CITRATE 50 MCG: 50 INJECTION INTRAMUSCULAR; INTRAVENOUS at 09:25

## 2022-02-09 RX ADMIN — ONDANSETRON 4 MG: 2 INJECTION INTRAMUSCULAR; INTRAVENOUS at 09:25

## 2022-02-09 RX ADMIN — SODIUM CHLORIDE, POTASSIUM CHLORIDE, SODIUM LACTATE AND CALCIUM CHLORIDE 125 ML/HR: 600; 310; 30; 20 INJECTION, SOLUTION INTRAVENOUS at 03:44

## 2022-02-09 RX ADMIN — ROCURONIUM BROMIDE 30 MG: 10 SOLUTION INTRAVENOUS at 09:30

## 2022-02-09 NOTE — DISCHARGE INSTRUCTIONS
Showers only for 6 weeks.  No tampons/ douching/ intercourse for 6 weeks.   No driving while taking pain medication

## 2022-02-09 NOTE — H&P
Patient Care Team:  Lizzy Coombs APRN as PCP - General (Family Medicine)    Chief complaint right lower quadrant pain    Subjective     Patient is a 26 y.o. female  4 para 2 with known ectopic pregnancy presents with severe right-sided pelvic and abdominal pain.  She got methotrexate about a week ago and was noncompliant with the follow-up.  Ultrasound shows a large amount of free fluid and she is having fairly severe pain.  Because of this we are going to take her to the operating room for a diagnostic laparoscopy with salpingostomy versus salpingectomy.  We discussed the risks and benefits in detail.    Review of Systems   Pertinent items are noted in HPI    History  Past Medical History:   Diagnosis Date   • Migraine     no meds prescribed   • Urinary tract infection      Past Surgical History:   Procedure Laterality Date   • WISDOM TOOTH EXTRACTION      one was removed     Family History   Problem Relation Age of Onset   • Diabetes Paternal Grandfather    • Hypertension Paternal Grandfather    • Hypertension Paternal Grandmother    • Hypertension Maternal Grandmother    • Hypertension Maternal Grandfather      Social History     Tobacco Use   • Smoking status: Former Smoker   • Smokeless tobacco: Never Used   Substance Use Topics   • Alcohol use: No   • Drug use: No     E-cigarette/Vaping     E-cigarette/Vaping Substances     No medications prior to admission.     Allergies:  Patient has no known allergies.    Objective     Vital Signs  Temp:  [96.5 °F (35.8 °C)-98 °F (36.7 °C)] 96.5 °F (35.8 °C)  Heart Rate:  [67-88] 85  Resp:  [16-18] 16  BP: ()/(59-76) 119/76    Physical Exam:      General Appearance:    Alert, cooperative, in no acute distress   Head:    Normocephalic, without obvious abnormality, atraumatic   Eyes:            Lids and lashes normal, conjunctivae and sclerae normal, no   icterus, no pallor, corneas clear, PERRLA   Ears:    Ears appear intact with no abnormalities noted    Throat:   No oral lesions, no thrush, oral mucosa moist   Neck:   No adenopathy, supple, trachea midline, no thyromegaly, no     carotid bruit, no JVD   Back:     No kyphosis present, no scoliosis present, no skin lesions,       erythema or scars, no tenderness to percussion or                   palpation,   range of motion normal   Lungs:     Clear to auscultation,respirations regular, even and                   unlabored    Heart:    Regular rhythm and normal rate, normal S1 and S2, no            murmur, no gallop, no rub, no click   Breast Exam:    Deferred   Abdomen:     Normal bowel sounds, no masses, no organomegaly, soft        Significant tenderness RLQ   Genitalia:    Deferred   Extremities:   Moves all extremities well, no edema, no cyanosis, no              redness   Pulses:   Pulses palpable and equal bilaterally   Skin:   No bleeding, bruising or rash   Lymph nodes:   No palpable adenopathy           Results Review:    I reviewed the patient's new clinical results.    Assessment/Plan       Ectopic pregnancy      To OR for diagnostic laparoscopy with salpingostomy versus salpingectomy.    I discussed the patient's findings and my recommendations with patient.     Wei Marrufo DO  02/09/22  08:30 EST

## 2022-02-09 NOTE — ANESTHESIA POSTPROCEDURE EVALUATION
Patient: Yvonne Barnett    Procedure Summary     Date: 02/09/22 Room / Location: [facility] OR 04 /  [facility] OR    Anesthesia Start: 0925 Anesthesia Stop: 1004    Procedure: LAPAROSCOPIC EXPLORATION FOR ECTOPIC PREGNANCY; right salpingectomy (N/A Vagina) Diagnosis:       Tubal pregnancy without intrauterine pregnancy, unspecified laterality      (Tubal pregnancy without intrauterine pregnancy, unspecified laterality [O00.109])    Surgeons: Wei Marrufo DO Provider: Kvng Xiao MD    Anesthesia Type: general ASA Status: 2          Anesthesia Type: general    Vitals  Vitals Value Taken Time   /69 02/09/22 1031   Temp 98.6 °F (37 °C) 02/09/22 1005   Pulse 55 02/09/22 1031   Resp 16 02/09/22 1031   SpO2 99 % 02/09/22 1031           Post Anesthesia Care and Evaluation    Patient location during evaluation: PHASE II  Patient participation: complete - patient participated  Level of consciousness: awake and alert  Pain score: 6  Pain management: adequate  Airway patency: patent  Anesthetic complications: No anesthetic complications    Cardiovascular status: acceptable  Respiratory status: acceptable  Hydration status: acceptable

## 2022-02-09 NOTE — ANESTHESIA PREPROCEDURE EVALUATION
Anesthesia Evaluation     Patient summary reviewed and Nursing notes reviewed   no history of anesthetic complications:  NPO Solid Status: > 8 hours  NPO Liquid Status: > 8 hours           Airway   Mallampati: II  TM distance: >3 FB  Neck ROM: full  No difficulty expected  Dental - normal exam     Pulmonary - negative pulmonary ROS and normal exam   Cardiovascular - negative cardio ROS and normal exam        Neuro/Psych  (+) headaches,    GI/Hepatic/Renal/Endo - negative ROS     Musculoskeletal (-) negative ROS    Abdominal  - normal exam   Substance History - negative use     OB/GYN    (+) Pregnant,         Other - negative ROS                         Anesthesia Plan    ASA 2     general       Anesthetic plan, all risks, benefits, and alternatives have been provided, discussed and informed consent has been obtained with: patient.

## 2022-02-09 NOTE — PAYOR COMM NOTE
"CONTACT:  CEFERINO ASHLEY MSN, APRN  UTILIZATION MANAGEMENT DEPT.  [facility]  [address], [address]  PHONE:  [phone]  FAX: [phone]    PATIENT DISCHARGED TO HOME ON 2/9/22    REFER TO AUTH # [id number]    Lisa Garza (26 y.o. Female)             Date of Birth Social Security Number Address Home Phone MRN    1995  [address] [place] [id number] [phone] [id number]    Gnosticist Marital Status             None Single       Admission Date Admission Type Admitting Provider Attending Provider Department, Room/Bed    2/8/22 Emergency Wei Marrufo,   [facility], W239/1    Discharge Date Discharge Disposition Discharge Destination          2/9/2022 Home or Self Care              Attending Provider: (none)   Allergies: No Known Allergies    Isolation: None   Infection: None   Code Status: Prior   Advance Care Planning Activity    Ht: 157.5 cm (62\")   Wt: 60.8 kg (134 lb)    Admission Cmt: None   Principal Problem: Ectopic pregnancy without intrauterine pregnancy [O00.90]                 Active Insurance as of 2/8/2022     Primary Coverage     Payor Plan Insurance Group Employer/Plan Group    [employer] [place] [employer] [place]      Payor Plan Address Payor Plan Phone Number Payor Plan Fax Number Effective Dates    [address] [address] [phone]  1/26/2022 - None Entered    [facility] [id number]       Subscriber Name Subscriber Birth Date Member ID       LISA GARZA 1995 [id number]                 Emergency Contacts      (Rel.) Home Phone Work Phone Mobile Phone    Diego Garza (Father) [phone] -- --            Discharge Summary    No notes of this type exist for this encounter.         "

## 2022-02-09 NOTE — ANESTHESIA PROCEDURE NOTES
Airway  Urgency: elective    Date/Time: 2/9/2022 9:32 AM  Airway not difficult    General Information and Staff    Patient location during procedure: OR  Anesthesiologist: Kvng Xiao MD  CRNA: Zeferino Weinstein CRNA    Indications and Patient Condition  Indications for airway management: airway protection    Preoxygenated: yes  MILS not maintained throughout  Mask difficulty assessment: 0 - not attempted    Final Airway Details  Final airway type: endotracheal airway      Successful airway: ETT  Cuffed: yes   Successful intubation technique: direct laryngoscopy  Endotracheal tube insertion site: oral  Blade: Cueto  Blade size: 2  ETT size (mm): 7.5  Cormack-Lehane Classification: grade I - full view of glottis  Placement verified by: chest auscultation and capnometry   Measured from: lips  ETT/EBT  to lips (cm): 22  Number of attempts at approach: 1  Assessment: lips, teeth, and gum same as pre-op and atraumatic intubation    Additional Comments  Atraumatic ETT placement, dentition unchanged.

## 2022-02-09 NOTE — ED PROVIDER NOTES
Subjective   26-year-old female presents to the ER with a primary complaint of right lower quadrant abdominal pain.  Patient notes she was recently seen for concerns for a possible ectopic pregnancy.  Review the patient's chart revealed that she left AMA before further treatment and may have received a dose of methotrexate.  Patient has had is been present since Friday evening.  Patient noted some symptom improvement through the weekend but noted significantly worsening right lower quadrant abdominal pain within the last 24 hours.  Patient noted increasing difficulty with movement.  Patient noted increasing difficulty with urination.  Denies significant fever chills.  Denied nausea or vomiting.  Confirmed mild vaginal bleeding.  Vitals stable          Review of Systems   Gastrointestinal: Positive for abdominal pain.   Genitourinary: Positive for vaginal bleeding.   All other systems reviewed and are negative.      Past Medical History:   Diagnosis Date   • Migraine     no meds prescribed   • Urinary tract infection        No Known Allergies    Past Surgical History:   Procedure Laterality Date   • WISDOM TOOTH EXTRACTION      one was removed       Family History   Problem Relation Age of Onset   • Diabetes Paternal Grandfather    • Hypertension Paternal Grandfather    • Hypertension Paternal Grandmother    • Hypertension Maternal Grandmother    • Hypertension Maternal Grandfather        Social History     Socioeconomic History   • Marital status: Single   Tobacco Use   • Smoking status: Former Smoker   • Smokeless tobacco: Never Used   Substance and Sexual Activity   • Alcohol use: No   • Drug use: No   • Sexual activity: Yes     Partners: Male           Objective   Physical Exam  Constitutional:       General: She is not in acute distress.     Appearance: Normal appearance. She is not ill-appearing.   HENT:      Head: Normocephalic and atraumatic.      Right Ear: External ear normal.      Left Ear: External ear  normal.      Nose: Nose normal.      Mouth/Throat:      Mouth: Mucous membranes are moist.   Eyes:      Extraocular Movements: Extraocular movements intact.      Pupils: Pupils are equal, round, and reactive to light.   Cardiovascular:      Rate and Rhythm: Normal rate and regular rhythm.      Heart sounds: No murmur heard.      Pulmonary:      Effort: Pulmonary effort is normal. No respiratory distress.      Breath sounds: Normal breath sounds. No wheezing.   Abdominal:      General: Bowel sounds are normal.      Palpations: Abdomen is soft.      Tenderness: There is abdominal tenderness in the right lower quadrant and suprapubic area.   Musculoskeletal:         General: No deformity or signs of injury. Normal range of motion.      Cervical back: Normal range of motion and neck supple.   Skin:     General: Skin is warm and dry.      Findings: No erythema.   Neurological:      General: No focal deficit present.      Mental Status: She is alert and oriented to person, place, and time. Mental status is at baseline.      Cranial Nerves: No cranial nerve deficit.   Psychiatric:         Mood and Affect: Mood normal.         Behavior: Behavior normal.         Thought Content: Thought content normal.         Procedures           ED Course  ED Course as of 02/09/22 0100   Wed Feb 09, 2022   0059 CBC and CCP unremarkable.  Urinalysis concerns for UTI.  Patient started on fluids and Rocephin.  Transabdominal ultrasound in ER screening noted concerns for a right adnexal cyst with fluid.  Radiology called to note that they could not rule out a possible ectopic or its location within the right adnexa.  Recommended obtaining a transvaginal ultrasound.  I called Dr. Marrufo and he noted that a transvaginal ultrasound would be necessary.  Patient will be admitted to women's health for further Treatment and care for possible ectopic pregnancy. [SF]      ED Course User Index  [SF] Pipo Vieira DO                                                  MDM    Final diagnoses:   Ectopic pregnancy, unspecified location, unspecified whether intrauterine pregnancy present       ED Disposition  ED Disposition     ED Disposition Condition Comment    Decision to Admit            No follow-up provider specified.       Medication List      No changes were made to your prescriptions during this visit.          Pipo Vieira DO  02/09/22 0100

## 2022-02-09 NOTE — PLAN OF CARE
Goal Outcome Evaluation:                 VSS. Patient is NPO. Pain managed with IV medications. Minimal bleeding noted. Will continue to monitor.      Problem: Adjustment to Pregnancy Loss (Ectopic Pregnancy)  Goal: Optimal Adjustment to Pregnancy Loss  Outcome: Ongoing, Progressing  Intervention: Support Psychosocial Response to Pregnancy Loss  Recent Flowsheet Documentation  Taken 2/9/2022 0230 by Yvonne Meehan RNA  Family/Support System Care: self-care encouraged     Problem: Bleeding (Ectopic Pregnancy)  Goal: Absence of Bleeding  Outcome: Ongoing, Progressing     Problem: Bowel Elimination Impaired (Ectopic Pregnancy)  Goal: Effective Bowel Elimination  Outcome: Ongoing, Progressing     Problem: Infection (Ectopic Pregnancy)  Goal: Absence of Infection Signs and Symptoms  Outcome: Ongoing, Progressing     Problem: Ongoing Anesthesia Effects (Ectopic Pregnancy)  Goal: Anesthesia/Sedation Recovery  Outcome: Ongoing, Progressing  Intervention: Optimize Anesthesia Recovery  Recent Flowsheet Documentation  Taken 2/9/2022 0230 by Yvonne Meehan RNA  Safety Promotion/Fall Prevention: safety round/check completed     Problem: Pain (Ectopic Pregnancy)  Goal: Acceptable Pain Control  Outcome: Ongoing, Progressing  Intervention: Prevent or Manage Pain  Recent Flowsheet Documentation  Taken 2/9/2022 0230 by Yvonne Meehan RNA  Diversional Activities:   television   smartphone     Problem: Postoperative Nausea and Vomiting (Ectopic Pregnancy)  Goal: Nausea and Vomiting Relief  Outcome: Ongoing, Progressing     Problem: Postoperative Urinary Retention (Ectopic Pregnancy)  Goal: Effective Urinary Elimination  Outcome: Ongoing, Progressing

## 2022-02-09 NOTE — PAYOR COMM NOTE
"CONTACT:  CEFERINO ASHLEY MSN, APRN  UTILIZATION MANAGEMENT DEPT.  [facility]  [address] [facility], [address]  PHONE:  [phone]  FAX: [phone]    INPATIENT AUTHORIZATION REQUEST.    Lisa Barnett (26 y.o. Female)             Date of Birth Social Security Number Address Home Phone MRN    1995  [address] [facility] [place] [address] [phone] [id number]    [place] Marital Status             None Single       Admission Date Admission Type Admitting Provider Attending Provider Department, Room/Bed    22 Emergency Wei Marrufo DO Gilbert, Travis Daniel, DO [facility] OPERATING ROOM DEPARTMENT, COR OR/MAIN OR    Discharge Date Discharge Disposition Discharge Destination           Home or Self Care              Attending Provider: Wei Marrufo DO    Allergies: No Known Allergies    Isolation: None   Infection: None   Code Status: Prior   Advance Care Planning Activity    Ht: 157.5 cm (62\")   Wt: 60.8 kg (134 lb)    Admission Cmt: None   Principal Problem: Ectopic pregnancy without intrauterine pregnancy [O00.90]                 Active Insurance as of 2022     Primary Coverage     Payor Plan Insurance Group Employer/Plan Group    [employer] [place] [employer]ICA[place]      Payor Plan Address Payor Plan Phone Number Payor Plan Fax Number Effective Dates    [address] [address] [phone]  2022 - None Entered    [place] [address]       Subscriber Name Subscriber Birth Date Member ID       LISA BARNETT 1995 [id number]                 Emergency Contacts      (Rel.) Home Phone Work Phone Mobile Phone    Diego Barnett (Father) [phone] -- --               History & Physical      Wei Marrufo DO at 22 0830              Patient Care Team:  Lizzy Coombs APRN as PCP - General (Family Medicine)    Chief complaint right lower quadrant pain    Subjective     Patient is a 26 y.o. female  4 " para 2 with known ectopic pregnancy presents with severe right-sided pelvic and abdominal pain.  She got methotrexate about a week ago and was noncompliant with the follow-up.  Ultrasound shows a large amount of free fluid and she is having fairly severe pain.  Because of this we are going to take her to the operating room for a diagnostic laparoscopy with salpingostomy versus salpingectomy.  We discussed the risks and benefits in detail.    Review of Systems   Pertinent items are noted in HPI    History  Past Medical History:   Diagnosis Date   • Migraine     no meds prescribed   • Urinary tract infection      Past Surgical History:   Procedure Laterality Date   • WISDOM TOOTH EXTRACTION      one was removed     Family History   Problem Relation Age of Onset   • Diabetes Paternal Grandfather    • Hypertension Paternal Grandfather    • Hypertension Paternal Grandmother    • Hypertension Maternal Grandmother    • Hypertension Maternal Grandfather      Social History     Tobacco Use   • Smoking status: Former Smoker   • Smokeless tobacco: Never Used   Substance Use Topics   • Alcohol use: No   • Drug use: No     E-cigarette/Vaping     E-cigarette/Vaping Substances     No medications prior to admission.     Allergies:  Patient has no known allergies.    Objective     Vital Signs  Temp:  [96.5 °F (35.8 °C)-98 °F (36.7 °C)] 96.5 °F (35.8 °C)  Heart Rate:  [67-88] 85  Resp:  [16-18] 16  BP: ()/(59-76) 119/76    Physical Exam:      General Appearance:    Alert, cooperative, in no acute distress   Head:    Normocephalic, without obvious abnormality, atraumatic   Eyes:            Lids and lashes normal, conjunctivae and sclerae normal, no   icterus, no pallor, corneas clear, PERRLA   Ears:    Ears appear intact with no abnormalities noted   Throat:   No oral lesions, no thrush, oral mucosa moist   Neck:   No adenopathy, supple, trachea midline, no thyromegaly, no     carotid bruit, no JVD   Back:     No kyphosis  present, no scoliosis present, no skin lesions,       erythema or scars, no tenderness to percussion or                   palpation,   range of motion normal   Lungs:     Clear to auscultation,respirations regular, even and                   unlabored    Heart:    Regular rhythm and normal rate, normal S1 and S2, no            murmur, no gallop, no rub, no click   Breast Exam:    Deferred   Abdomen:     Normal bowel sounds, no masses, no organomegaly, soft        Significant tenderness RLQ   Genitalia:    Deferred   Extremities:   Moves all extremities well, no edema, no cyanosis, no              redness   Pulses:   Pulses palpable and equal bilaterally   Skin:   No bleeding, bruising or rash   Lymph nodes:   No palpable adenopathy           Results Review:    I reviewed the patient's new clinical results.    Assessment/Plan       Ectopic pregnancy      To OR for diagnostic laparoscopy with salpingostomy versus salpingectomy.    I discussed the patient's findings and my recommendations with patient.     Wei Marrufo DO  02/09/22  08:30 EST        Electronically signed by Wei Marrufo DO at 02/09/22 0832          Emergency Department Notes      Reji Quintanilla PA-C at 02/08/22 2327     Attestation signed by Archie Sheldon MD at 02/08/22 2334    .                         MEDICAL SCREENING:    Reason for Visit: worsening abdominal pain. Pt seen here Friday and diagnosed and admitted to [facility] for ectopic pregnancy. Pt unknown LMP and gestational age. Was suppose to have hcg level re-done this day however states was unable.    Review of records reveal patient to be approx 4-6 weeks gestation. Pt was admitted by Dr. Morrow for MTX, however left AMA same day of admission.    Patient initially seen in triage.  The patient was advised further evaluation and diagnostic testing will be needed, some of the treatment and testing will be initiated in the lobby in order to begin the  process.  The patient will be returned to the waiting area for the time being and possibly be re-assessed by a subsequent ED provider.  The patient will be brought back to the treatment area in as timely manner as possible.         Reji Quintanilla PA-C  02/08/22 5383      Electronically signed by Archie Sheldon MD at 02/08/22 1202     Pipo Vieira DO at 02/09/22 0048          Subjective   26-year-old female presents to the ER with a primary complaint of right lower quadrant abdominal pain.  Patient notes she was recently seen for concerns for a possible ectopic pregnancy.  Review the patient's chart revealed that she left AMA before further treatment and may have received a dose of methotrexate.  Patient has had is been present since Friday evening.  Patient noted some symptom improvement through the weekend but noted significantly worsening right lower quadrant abdominal pain within the last 24 hours.  Patient noted increasing difficulty with movement.  Patient noted increasing difficulty with urination.  Denies significant fever chills.  Denied nausea or vomiting.  Confirmed mild vaginal bleeding.  Vitals stable          Review of Systems   Gastrointestinal: Positive for abdominal pain.   Genitourinary: Positive for vaginal bleeding.   All other systems reviewed and are negative.      Past Medical History:   Diagnosis Date   • Migraine     no meds prescribed   • Urinary tract infection        No Known Allergies    Past Surgical History:   Procedure Laterality Date   • WISDOM TOOTH EXTRACTION      one was removed       Family History   Problem Relation Age of Onset   • Diabetes Paternal Grandfather    • Hypertension Paternal Grandfather    • Hypertension Paternal Grandmother    • Hypertension Maternal Grandmother    • Hypertension Maternal Grandfather        Social History     Socioeconomic History   • Marital status: Single   Tobacco Use   • Smoking status: Former Smoker   • Smokeless tobacco:  Never Used   Substance and Sexual Activity   • Alcohol use: No   • Drug use: No   • Sexual activity: Yes     Partners: Male           Objective   Physical Exam  Constitutional:       General: She is not in acute distress.     Appearance: Normal appearance. She is not ill-appearing.   HENT:      Head: Normocephalic and atraumatic.      Right Ear: External ear normal.      Left Ear: External ear normal.      Nose: Nose normal.      Mouth/Throat:      Mouth: Mucous membranes are moist.   Eyes:      Extraocular Movements: Extraocular movements intact.      Pupils: Pupils are equal, round, and reactive to light.   Cardiovascular:      Rate and Rhythm: Normal rate and regular rhythm.      Heart sounds: No murmur heard.      Pulmonary:      Effort: Pulmonary effort is normal. No respiratory distress.      Breath sounds: Normal breath sounds. No wheezing.   Abdominal:      General: Bowel sounds are normal.      Palpations: Abdomen is soft.      Tenderness: There is abdominal tenderness in the right lower quadrant and suprapubic area.   Musculoskeletal:         General: No deformity or signs of injury. Normal range of motion.      Cervical back: Normal range of motion and neck supple.   Skin:     General: Skin is warm and dry.      Findings: No erythema.   Neurological:      General: No focal deficit present.      Mental Status: She is alert and oriented to person, place, and time. Mental status is at baseline.      Cranial Nerves: No cranial nerve deficit.   Psychiatric:         Mood and Affect: Mood normal.         Behavior: Behavior normal.         Thought Content: Thought content normal.         Procedures          ED Course  ED Course as of 02/09/22 0100   Wed Feb 09, 2022   0059 CBC and CCP unremarkable.  Urinalysis concerns for UTI.  Patient started on fluids and Rocephin.  Transabdominal ultrasound in ER screening noted concerns for a right adnexal cyst with fluid.  Radiology called to note that they could not rule  out a possible ectopic or its location within the right adnexa.  Recommended obtaining a transvaginal ultrasound.  I called Dr. Marrufo and he noted that a transvaginal ultrasound would be necessary.  Patient will be admitted to women's health for further Treatment and care for possible ectopic pregnancy. [SF]      ED Course User Index  [SF] Pipo Vieira DO                                                 MDM    Final diagnoses:   Ectopic pregnancy, unspecified location, unspecified whether intrauterine pregnancy present       ED Disposition  ED Disposition     ED Disposition Condition Comment    Decision to Admit            No follow-up provider specified.       Medication List      No changes were made to your prescriptions during this visit.          Pipo Vieira DO  02/09/22 0100      Electronically signed by Pipo Vieira DO at 02/09/22 0100     Symes, Heather at 02/09/22 0052        Dr. Vieira is on the line with Dr. Marrufo at this time.      Symes, Heather  02/09/22 0052      Electronically signed by Symes, Heather at 02/09/22 0052       Vital Signs (last day)     Date/Time Temp Temp src Pulse Resp BP Patient Position SpO2    02/09/22 1015 -- -- 93 16 110/62 Lying 100    02/09/22 1010 -- -- 89 16 109/61 Lying 100    02/09/22 1005 98.6 (37) Temporal 94 18 111/64 Lying 100    02/09/22 0905 97.5 (36.4) Oral 69 18 112/74 Lying 100    02/09/22 0730 96.5 (35.8) Oral 85 16 119/76 Sitting 97    02/09/22 0230 98 (36.7) Oral 67 16 115/69 Lying 99    02/09/22 0222 -- -- -- -- 115/64 -- 100    02/09/22 0207 -- -- -- -- 116/64 -- 100    02/09/22 0152 -- -- -- -- 105/72 -- 100    02/09/22 0135 -- -- 79 18 114/66 Sitting 98    02/09/22 0129 -- -- -- -- 123/76 -- 100    02/09/22 0052 -- -- -- -- 104/59 -- 100    02/09/22 0037 -- -- -- -- 95/62 -- 100    02/08/22 2316 97.8 (36.6) Infrared 88 18 120/74 Sitting 97          Intake & Output (last day)       02/08 0701 02/09 0700 02/09 0701  02/10 0700    I.V. (mL/kg)  1000 (16.4) 900 (14.8)    Total Intake(mL/kg) 1000 (16.4) 900 (14.8)    Net +1000 +900                Medication Administration Report for Yvonne Barnett as of 02/09/22 1017   Legend:    Given Hold Not Given Due Canceled Entry Other Actions    Time Time (Time) Time  Time-Action       Discontinued     Completed     Future     MAR Hold     Linked           Medications 02/08/22 02/09/22    sodium chloride 0.9 % flush 10 mL  Dose: 10 mL  Freq: Every 12 Hours Scheduled Route: IV  Start: 02/09/22 0900   End: 02/09/22 1010 0900               sodium chloride 0.9 % flush 3 mL  Dose: 3 mL  Freq: Every 12 Hours Scheduled Route: IV  Start: 02/09/22 0900   End: 02/09/22 1010 0900              Future Medications  Medications 02/08/22 02/09/22       cefTRIAXone (ROCEPHIN) 1 g in sodium chloride 0.9 % 100 mL IVPB-VTB  Dose: 1 g  Freq: Every 24 Hours Route: IV  Indications of Use: URINARY TRACT INFECTION  Start: 02/10/22 0000   End: 02/10/22 2359   Admin Instructions:   Caution: Look alike/sound alike drug alert        Completed Medications  Medications 02/08/22 02/09/22       ceFOXitin (MEFOXIN) 1 g in sodium chloride 0.9 % 100 mL IVPB  Dose: 1 g  Freq: 60 Minutes Pre-Op Route: IV  Indications of Use: PERIOPERATIVE PHARMACOPROPHYLAXIS  Start: 02/09/22 0852   End: 02/09/22 0925   Admin Instructions:   Caution: Look alike/sound alike drug alert                Activate vial before using.        0925-New Bag               cefTRIAXone (ROCEPHIN) 1 g in sodium chloride 0.9 % 100 mL IVPB-VTB  Dose: 1 g  Freq: Once Route: IV  Indications of Use: URINARY TRACT INFECTION  Start: 02/09/22 0050   End: 02/09/22 0159   Admin Instructions:   Caution: Look alike/sound alike drug alert        0129-New Bag               lactated ringers infusion  Dose: 125 mL/hr  Freq: Once Route: IV  Start: 02/09/22 0855   End: 02/09/22 0914        0914-New Bag               sodium chloride 0.9 % bolus 1,000 mL  Dose: 1,000 mL  Freq: Once  Route: IV  Start: 02/09/22 0050   End: 02/09/22 0158        0128-New Bag                     and   Medication Administration Report for Yvonne Barnett as of 02/09/22 1017   Legend:    Given Hold Not Given Due Canceled Entry Other Actions    Time Time (Time) Time  Time-Action       Discontinued     Completed     Future     MAR Hold     Linked           Medications 02/08/22 02/09/22    lactated ringers infusion  Rate: 125 mL/hr Dose: 125 mL/hr  Freq: Continuous Route: IV  Start: 02/09/22 0415        0344-New Bag     0924-Paused [C]     0925-New Bag     1004-Stopped                     Lab Results (last 24 hours)     Procedure Component Value Units Date/Time    CBC & Differential [[id number]]  (Abnormal) Collected: 02/09/22 0650    Specimen: Blood Updated: 02/09/22 0715    Narrative:      The following orders were created for panel order CBC & Differential.  Procedure                               Abnormality         Status                     ---------                               -----------         ------                     CBC Auto Differential[[id number]]        Abnormal            Final result                 Please view results for these tests on the individual orders.    CBC Auto Differential [[id number]]  (Abnormal) Collected: 02/09/22 0650    Specimen: Blood Updated: 02/09/22 0715     WBC 7.65 10*3/mm3      RBC 3.47 10*6/mm3      Hemoglobin 10.0 g/dL      Hematocrit 30.6 %      MCV 88.2 fL      MCH 28.8 pg      MCHC 32.7 g/dL      RDW 12.5 %      RDW-SD 40.2 fl      MPV 10.5 fL      Platelets 207 10*3/mm3      Neutrophil % 56.2 %      Lymphocyte % 37.9 %      Monocyte % 5.0 %      Eosinophil % 0.3 %      Basophil % 0.3 %      Immature Grans % 0.3 %      Neutrophils, Absolute 4.31 10*3/mm3      Lymphocytes, Absolute 2.90 10*3/mm3      Monocytes, Absolute 0.38 10*3/mm3      Eosinophils, Absolute 0.02 10*3/mm3      Basophils, Absolute 0.02 10*3/mm3      Immature Grans, Absolute 0.02 10*3/mm3       nRBC 0.0 /100 WBC     COVID PRE-OP / PRE-PROCEDURE SCREENING ORDER (NO ISOLATION) - Swab, Nasopharynx [[id number]]  (Normal) Collected: 02/09/22 0141    Specimen: Swab from Nasopharynx Updated: 02/09/22 0206    Narrative:      The following orders were created for panel order COVID PRE-OP / PRE-PROCEDURE SCREENING ORDER (NO ISOLATION) - Swab, Nasopharynx.  Procedure                               Abnormality         Status                     ---------                               -----------         ------                     COVID-19 and FLU A/B PCR...[[id number]]  Normal              Final result                 Please view results for these tests on the individual orders.    COVID-19 and FLU A/B PCR - Swab, Nasopharynx [[id number]]  (Normal) Collected: 02/09/22 0141    Specimen: Swab from Nasopharynx Updated: 02/09/22 0206     COVID19 Not Detected     Influenza A PCR Not Detected     Influenza B PCR Not Detected    Narrative:      Fact sheet for providers: https://www.fda.gov/media/989923/download    Fact sheet for patients: https://www.fda.gov/media/788915/download    Test performed by PCR.    [facility] Draw [[id number]] Collected: 02/09/22 0002    Specimen: Blood from Arm, Right Updated: 02/09/22 0115    Narrative:      The following orders were created for panel order [facility] Draw.  Procedure                               Abnormality         Status                     ---------                               -----------         ------                     Green Top (Gel)[[id number]]                                  Final result               Lavender Top[[id number]]                                     Final result               Gold Top - SST[[id number]]                                   Final result               Light Blue Top[[id number]]                                   Final result                 Please view results for these tests on the individual orders.    Green Top (Gel) [[id number]] Collected: 02/09/22 0002     Specimen: Blood from Arm, Right Updated: 02/09/22 0115     Extra Tube Hold for add-ons.     Comment: Auto resulted.       Gold Top - SST [[id number]] Collected: 02/09/22 0002    Specimen: Blood from Arm, Right Updated: 02/09/22 0115     Extra Tube Hold for add-ons.     Comment: Auto resulted.       Light Blue Top [[id number]] Collected: 02/09/22 0002    Specimen: Blood from Arm, Right Updated: 02/09/22 0115     Extra Tube hold for add-on     Comment: Auto resulted       Lavender Top [[id number]] Collected: 02/09/22 0002    Specimen: Blood from Arm, Right Updated: 02/09/22 0115     Extra Tube hold for add-on     Comment: Auto resulted       hCG, Quantitative, Pregnancy [[id number]] Collected: 02/09/22 0002    Specimen: Blood from Arm, Right Updated: 02/09/22 0042     HCG Quantitative 1,327.00 mIU/mL     Narrative:      HCG Ranges by Gestational Age    Females - non-pregnant premenopausal   </= 1mIU/mL HCG  Females - postmenopausal               </= 7mIU/mL HCG    3 Weeks         5.8 -    71.2 mIU/mL  4 Weeks         9.5 -     750 mIU/mL  5 Weeks         217 -   7,138 mIU/mL  6 Weeks         158 -  31,795 mIU/mL  7 Weeks       3,697 - 163,563 mIU/mL  8 Weeks      32,065 - 149,571 mIU/mL  9 Weeks      63,803 - 151,410 mIU/mL  10 Weeks     46,509 - 186,977 mIU/mL  12 Weeks     27,832 - 210,612 mIU/mL  14 Weeks     13,950 -  62,530 mIU/mL  15 Weeks     12,039 -  70,971 mIU/mL  16 Weeks      9,040 -  56,451 mIU/mL  17 Weeks      8,175 -  55,868 mIU/mL  18 Weeks      8,099 -  58,176 mIU/mL  Results may be falsely decreased if patient taking Biotin.      Comprehensive Metabolic Panel [[id number]]  (Abnormal) Collected: 02/09/22 0002    Specimen: Blood from Arm, Right Updated: 02/09/22 0035     Glucose 115 mg/dL      BUN 10 mg/dL      Creatinine 0.68 mg/dL      Sodium 141 mmol/L      Potassium 3.6 mmol/L      Chloride 108 mmol/L      CO2 22.3 mmol/L      Calcium 9.2 mg/dL      Total Protein 6.9 g/dL      Albumin 4.41 g/dL       ALT (SGPT) 19 U/L      AST (SGOT) 18 U/L      Alkaline Phosphatase 56 U/L      Total Bilirubin 0.2 mg/dL      eGFR Non African Amer 105 mL/min/1.73      Globulin 2.5 gm/dL      A/G Ratio 1.8 g/dL      BUN/Creatinine Ratio 14.7     Anion Gap 10.7 mmol/L     Narrative:      GFR Normal >60  Chronic Kidney Disease <60  Kidney Failure <15      Urinalysis, Microscopic Only - Urine, Clean Catch [[id number]]  (Abnormal) Collected: 02/09/22 0007    Specimen: Urine, Clean Catch Updated: 02/09/22 0022     RBC, UA 0-2 /HPF      WBC, UA 6-12 /HPF      Bacteria, UA None Seen /HPF      Squamous Epithelial Cells, UA 3-6 /HPF      Hyaline Casts, UA None Seen /LPF      Methodology Automated Microscopy    Urinalysis With Microscopic If Indicated (No Culture) - Urine, Clean Catch [[id number]]  (Abnormal) Collected: 02/09/22 0007    Specimen: Urine, Clean Catch Updated: 02/09/22 0020     Color, UA Yellow     Appearance, UA Clear     pH, UA 5.5     Specific Gravity, UA 1.024     Glucose, UA Negative     Ketones, UA Negative     Bilirubin, UA Negative     Blood, UA Large (3+)     Protein, UA Negative     Leuk Esterase, UA Small (1+)     Nitrite, UA Negative     Urobilinogen, UA 0.2 E.U./dL    CBC & Differential [[id number]]  (Abnormal) Collected: 02/09/22 0002    Specimen: Blood from Arm, Right Updated: 02/09/22 0013    Narrative:      The following orders were created for panel order CBC & Differential.  Procedure                               Abnormality         Status                     ---------                               -----------         ------                     CBC Auto Differential[[id number]]        Abnormal            Final result                 Please view results for these tests on the individual orders.    CBC Auto Differential [[id number]]  (Abnormal) Collected: 02/09/22 0002    Specimen: Blood from Arm, Right Updated: 02/09/22 0013     WBC 8.13 10*3/mm3      RBC 3.84 10*6/mm3      Hemoglobin 11.2 g/dL      Hematocrit  33.3 %      MCV 86.7 fL      MCH 29.2 pg      MCHC 33.6 g/dL      RDW 12.4 %      RDW-SD 39.2 fl      MPV 10.2 fL      Platelets 224 10*3/mm3      Neutrophil % 73.2 %      Lymphocyte % 21.3 %      Monocyte % 4.3 %      Eosinophil % 0.6 %      Basophil % 0.4 %      Immature Grans % 0.2 %      Neutrophils, Absolute 5.95 10*3/mm3      Lymphocytes, Absolute 1.73 10*3/mm3      Monocytes, Absolute 0.35 10*3/mm3      Eosinophils, Absolute 0.05 10*3/mm3      Basophils, Absolute 0.03 10*3/mm3      Immature Grans, Absolute 0.02 10*3/mm3      nRBC 0.0 /100 WBC         Imaging Results (All)     Procedure Component Value Units Date/Time    FL kael endo (surgery) [[id number]] Resulted: 02/09/22 0858     Updated: 02/09/22 0858    Narrative:      This procedure was auto-finalized with no dictation required.    US Ob Transvaginal [[id number]] Collected: 02/09/22 0137     Updated: 02/09/22 0139    Narrative:      US OB Transvaginal     INDICATION:    Abdominal pain. Patient pregnant. History of right-sided ectopic pregnancy status post recent methotrexate administration.    TECHNIQUE:   Transvaginal imaging of the pelvis in multiple planes.    COMPARISON:    Transabdominal images, same day.    FINDINGS:  Uterus appears normal with no intrauterine pregnancy identified. There is free fluid in the cul-de-sac. Additionally, there is some soft tissue noted in the cul-de-sac surrounded by fluid which may reflect a small fetal pole and yolk sac. It is unclear  if this is within the fallopian tube or external to it. This is near the right ovary. No cardiac activity is identified by Doppler.      Impression:        1. No intrauterine pregnancy.  2. Fluid in the cul-de-sac. There is abnormal soft tissue in the cul-de-sac near the right ovary, worrisome for ectopic pregnancy. It is unclear if this is within the right fallopian tube or external to it. This tissue does not appear to have cardiac  activity by Doppler at this time. OB/GYN  follow-up recommended.    Signer Name: Anish Olvera MD   Signed: 2/9/2022 1:37 AM   Workstation Name: [facility]    [facility] Ob < 14 Weeks Single or First Gestation [[id number]] Collected: 02/09/22 0044     Updated: 02/09/22 0046    Narrative:      US OB 2 or 3 Tri Sgl 1st Gest     INDICATION:    Abdominal pain. Patient pregnant. Patient has history of right-sided ectopic pregnancy status post recent methotrexate administration.    TECHNIQUE:   Transabdominal imaging of the pelvis in multiple planes.    COMPARISON:    None available.    FINDINGS:  Exam is degraded by the lack of transvaginal images. Uterus measures 6.4 x 3.9 x 4.2 cm. The endometrial stripe is somewhat heterogeneous measuring about 9 mm in thickness. No intrauterine pregnancy is identified. The left ovary is normal and shows  arterial and venous perfusion by Doppler. The right ovary is less well characterized although it does appear to show arterial and venous perfusion by Doppler as well. There is some fluid versus a poorly defined cystic lesion in the right adnexa. This  could reflect the patient's known ectopic pregnancy or possibly some free fluid.      Impression:      Limited exam demonstrating no intrauterine pregnancy. Both ovaries appear to show perfusion by Doppler. There is a nonspecific cystic lesion or simply some free fluid in the right adnexa, possibly the patient's known ectopic. Continued follow-up is  recommended.    NOTIFICATION: Critical Value/emergent results were called by telephone at the time of interpretation on 2/9/2022 12:42 AM to Dr. Vieira in the emergency department who verbally acknowledged these results.    Signer Name: Anish Olvera MD   Signed: 2/9/2022 12:44 AM   Workstation Name: [facility]    Radiology [facility]          Orders (all)      Start     Ordered    02/10/22 0001  NPO Diet  Diet Effective Midnight         02/09/22 0834    02/10/22 0000  cefTRIAXone  "(ROCEPHIN) 1 g in sodium chloride 0.9 % 100 mL IVPB-VTB  Every 24 Hours         02/09/22 0329    02/09/22 1014  Oxygen Therapy- Nasal Cannula; Titrate for SPO2: equal to or greater than, 96%, per policy  Continuous         02/09/22 1013    02/09/22 1014  Pulse Oximetry, Continuous  Continuous         02/09/22 1013    02/09/22 1014  POC Glucose STAT  STAT        Comments: Notify Anesthesia if blood sugar is less than 80 mg/dL or greater than 180mg/dL      02/09/22 1013    02/09/22 1014  Vital signs every 5 minutes for 15 minutes, every 15 minutes thereafter.  Once         02/09/22 1013    02/09/22 1014  Call Anesthesiologist for additional IV Fluid bolus for Hypotension/Tachycardia  Until Discontinued         02/09/22 1013    02/09/22 1014  Notify Anesthesia of Any Acute Changes in Patient Condition  Until Discontinued         02/09/22 1013    02/09/22 1014  Notify Anesthesia for Unrelieved Pain  Until Discontinued         02/09/22 1013    02/09/22 1014  Once DC criteria to floor met, follow surgeon's orders.  Until Discontinued         02/09/22 1013    02/09/22 1014  Discharge patient from PACU when discharge criteria is met.  Until Discontinued         02/09/22 1013    02/09/22 1013  lactated ringers infusion  Once As Needed         02/09/22 1013    02/09/22 1013  oxyCODONE-acetaminophen (PERCOCET) 5-325 MG per tablet 1 tablet  Once As Needed         02/09/22 1013    02/09/22 1013  ketorolac (TORADOL) injection 30 mg  Every 6 Hours PRN         02/09/22 1013    02/09/22 1013  fentaNYL citrate (PF) (SUBLIMAZE) injection 50 mcg  Every 5 Minutes PRN         02/09/22 1013    02/09/22 1013  ondansetron (ZOFRAN) injection 4 mg  As Needed         02/09/22 1013    02/09/22 1013  droperidol (INAPSINE) injection 0.625 mg  Once As Needed        \"Or\" Linked Group Details    02/09/22 1013    02/09/22 1013  droperidol (INAPSINE) injection 0.625 mg  Once As Needed        \"Or\" Linked Group Details    02/09/22 1013    02/09/22 1013  " meperidine (DEMEROL) injection 12.5 mg  Every 5 Minutes PRN         02/09/22 1013    02/09/22 1013  ipratropium-albuterol (DUO-NEB) nebulizer solution 3 mL  Once As Needed         02/09/22 1013    02/09/22 1005  Discharge patient  Once         02/09/22 1006    02/09/22 0958  Tissue Pathology Exam  RELEASE UPON ORDERING         02/09/22 0958    02/09/22 0955  sterile water irrigation solution  As Needed,   Status:  Discontinued         02/09/22 0955    02/09/22 0954  bupivacaine (MARCAINE) 0.5 % injection  As Needed,   Status:  Discontinued         02/09/22 0954    02/09/22 0954  bupivacaine-EPINEPHrine (MARCAINE w/EPI) injection  As Needed,   Status:  Discontinued         02/09/22 0954    02/09/22 0954  sodium chloride (NS) irrigation solution  As Needed,   Status:  Discontinued         02/09/22 0955    02/09/22 0900  sodium chloride 0.9 % flush 3 mL  Every 12 Hours Scheduled,   Status:  Discontinued         02/09/22 0852    02/09/22 0900  sodium chloride 0.9 % flush 10 mL  Every 12 Hours Scheduled,   Status:  Discontinued         02/09/22 0853    02/09/22 0855  lactated ringers infusion  Once         02/09/22 0853    02/09/22 0854  [facility] endo (surgery)  1 Time Imaging         02/09/22 0853    02/09/22 0854  Oxygen Therapy- Nasal Cannula; Titrate for SPO2: equal to or greater than, 90%  Continuous,   Status:  Canceled         02/09/22 0853    02/09/22 0854  POC Glucose Once  Once,   Status:  Canceled        Comments: For all diabetic patients and all patients who are to be admitted. Notify Anesthesiologist for blood sugar > 180.      02/09/22 0853    02/09/22 0854  POC Urine Pregnancy Test  Once,   Status:  Canceled         02/09/22 0853    02/09/22 0854  Insert Peripheral IV  Once,   Status:  Canceled         02/09/22 0853    02/09/22 0854  Saline Lock & Maintain IV Access  Continuous         02/09/22 0853    02/09/22 0854  May take Beta Blocker from home with sip of water.  Once,   Status:  Canceled          02/09/22 0853    02/09/22 0853  Vital Signs - Per Anesthesia Protocol  As Needed,   Status:  Canceled       02/09/22 0853    02/09/22 0853  sodium chloride 0.9 % flush 10 mL  As Needed,   Status:  Discontinued         02/09/22 0853    02/09/22 0853  midazolam (VERSED) injection 1 mg  Every 10 Minutes PRN,   Status:  Discontinued         02/09/22 0853    02/09/22 0853  Follow Anesthesia Guidelines / Standing Orders  Once,   Status:  Canceled         02/09/22 0852    02/09/22 0853  Obtain informed consent  Once,   Status:  Canceled         02/09/22 0852    02/09/22 0853  Pregnancy, Urine -  STAT         02/09/22 0852    02/09/22 0853  Insert Peripheral IV  Once,   Status:  Canceled         02/09/22 0852    02/09/22 0853  Saline Lock & Maintain IV Access  Continuous,   Status:  Canceled         02/09/22 0852    02/09/22 0852  Verify / Perform Chlorhexidine Skin Prep  As Needed,   Status:  Canceled      Comments: Chlorhexidine Skin wipes and instructions for all patients having a procedure requiring an outward incision if not allergic.  If allergic, give antibacterial skin wipes and instructions.  Do not use for facial cases or on any mucus membranes.    02/09/22 0852    02/09/22 0852  sodium chloride 0.9 % flush 10 mL  As Needed,   Status:  Discontinued         02/09/22 0852    02/09/22 0852  ceFOXitin (MEFOXIN) 1 g in sodium chloride 0.9 % 100 mL IVPB  60 Minutes Pre-Op         02/09/22 0852    02/09/22 0833  Case Request  Once         02/09/22 0834    02/09/22 0823  US Non-ob Transvaginal  1 Time Imaging,   Status:  Canceled         02/09/22 0823    02/09/22 0600  CBC & Differential  Morning Draw         02/09/22 0329    02/09/22 0600  Type & Screen  Morning Draw,   Status:  Canceled         02/09/22 0329    02/09/22 0600  CBC Auto Differential  PROCEDURE ONCE         02/09/22 0329    02/09/22 0415  lactated ringers infusion  Continuous         02/09/22 0329    02/09/22 0326  [MAR Hold]  HYDROmorphone (DILAUDID)  "injection 0.5 mg  Every 2 Hours PRN        (MAR Hold since 2022 at 0850.Hold Reason: Unreviewed Transfer Orders.)    22 03222 032  NPO Diet  Diet Effective Now         22 0329    22 0325  [MAR Hold]  ketorolac (TORADOL) injection 30 mg  Every 6 Hours PRN        (MAR Hold since 2022 at 0850.Hold Reason: Unreviewed Transfer Orders.)    22 03222 014  COVID PRE-OP / PRE-PROCEDURE SCREENING ORDER (NO ISOLATION) - Swab, Nasopharynx  Once         22 0140    22 014  COVID-19 and FLU A/B PCR - Swab, Nasopharynx  PROCEDURE ONCE         22 0140    22 010  Inpatient Admission  Once         22 0102    22 0100  US Ob Transvaginal  1 Time Imaging         22 0059    22 0050  sodium chloride 0.9 % bolus 1,000 mL  Once         22 0048    22 0050  cefTRIAXone (ROCEPHIN) 1 g in sodium chloride 0.9 % 100 mL IVPB-VTB  Once         22 0048    22 0049  Insert peripheral IV  Once        \"And\" Linked Group Details    22 0048    22 0048  [MAR Hold]  sodium chloride 0.9 % flush 10 mL  As Needed        (MAR Hold since 2022 at 0850.Hold Reason: Unreviewed Transfer Orders.)   \"And\" Linked Group Details    22 0048    22 0020  Urinalysis, Microscopic Only - Urine, Clean Catch  Once         22 0019    22 0000  ibuprofen (ADVIL,MOTRIN) 600 MG tablet  Every 6 Hours PRN         22 1006    22 0000  HYDROcodone-acetaminophen (NORCO) 5-325 MG per tablet  Every 4 Hours PRN         22 1006    22 0000  nitrofurantoin, macrocrystal-monohydrate, (Macrobid) 100 MG capsule  2 Times Daily         22 1006    22 2327  US Ob < 14 Weeks Single or First Gestation  1 Time Imaging         22 2326    22 2325  Urinalysis With Microscopic If Indicated (No Culture) - Urine, Clean Catch  Once         22 2326    22 2325   RhIg " "Evaluation  Once        \"And\" Linked Group Details    02/08/22 2326    02/08/22 2325  Doses of Rh Immune Globulin  Once        \"And\" Linked Group Details    02/08/22 2326    02/08/22 2324  [place] Draw  Once         02/08/22 2326    02/08/22 2324  CBC & Differential  Once         02/08/22 2326    02/08/22 2324  Comprehensive Metabolic Panel  Once         02/08/22 2326    02/08/22 2324  hCG, Quantitative, Pregnancy  Once         02/08/22 2326    02/08/22 2324  Green Top (Gel)  PROCEDURE ONCE         02/08/22 2326    02/08/22 2324  Lavender Top  PROCEDURE ONCE         02/08/22 2326    02/08/22 2324  Gold Top - SST  PROCEDURE ONCE         02/08/22 2326    02/08/22 2324  Light Blue Top  PROCEDURE ONCE         02/08/22 2326    02/08/22 2324  CBC Auto Differential  PROCEDURE ONCE         02/08/22 2326                   Operative/Procedure Notes (all)      Wei Marrufo DO at 02/09/22 0941  Version 1 of 1         LAPAROSCOPIC EXPLORATION FOR ECTOPIC PREGNANCY  Procedure Note    Yvonne Patyjackie Barnett  2/9/2022    Pre-op Diagnosis:   Tubal pregnancy without intrauterine pregnancy, unspecified laterality [O00.109]    Post-op Diagnosis:     Post-Op Diagnosis Codes:     * Tubal pregnancy without intrauterine pregnancy, unspecified laterality [O00.109]    Procedure(s):  LAPAROSCOPIC EXPLORATION FOR ECTOPIC PREGNANCY; right salpingectomy    Surgeon(s):  Wei Marrufo DO    Anesthesia: General    Estimated Blood Loss: minimal    Specimens:  Order Name Source Comment Collection Info Order Time   PREGNANCY, URINE    2/9/2022  8:52 AM     Release to patient   Immediate        TISSUE PATHOLOGY EXAM Fallopian Tube, Right  Collected By: Wei Marrufo DO 2/9/2022  9:58 AM     Release to patient   Immediate              Procedure:  The patient was taken to the operating room, given general anesthesia, prepped and draped in the usual sterile fashion.  The bladder was drained with a straight catheter.  A " speculum was placed into the vagina and a uterine manipulator was placed.  The surgeon was regloved and attention made to the abdomen.      A one centimeter incision was made into the umbilicus and an OPTIVIEW trocar was placed into the abdomen under direct visualization using the laparoscope.  The abdomen was filled with CO2 gas up to 15mm mercury pressure.  A second eight millimeter trocar was placed suprapubically to the right of the midline.  A third 5 mm trocar was placed to the left of the midline.      When we entered the abdomen there was hemoperitoneum noted.  The right fallopian tube was swollen with ectopic pregnancy.  The fimbriated end appeared to be blunted and somewhat irregular looking so the decision was made to remove the right fallopian tube.    We picked up the right fallopian tube and cauterized under using the Enseal device to the point where we got to the cornua flush with the uterus and cauterized across it with the Enseal. The rest of the abdomen and pelvis was grossly normal.     We removed the CO2 gas and trocars.  The skin incisions were closed with a 4-0 monocryl and surgical adhesive.      Sponge, lap and needle counts were correct.       Findings: Hemoperitoneum, right tubal pregnancy    Complications: none    Grafts or Implants: NA    Wei Marrufo DO     Date: 2/9/2022  Time: 10:08 EST    Electronically signed by Wei Marrufo DO at 02/09/22 1010

## 2022-02-09 NOTE — ED NOTES
MEDICAL SCREENING:    Reason for Visit: worsening abdominal pain. Pt seen here Friday and diagnosed and admitted to [facility] for ectopic pregnancy. Pt unknown LMP and gestational age. Was suppose to have hcg level re-done this day however states was unable.    Review of records reveal patient to be approx 4-6 weeks gestation. Pt was admitted by Dr. Morrow for MTX, however left AMA same day of admission.    Patient initially seen in triage.  The patient was advised further evaluation and diagnostic testing will be needed, some of the treatment and testing will be initiated in the lobby in order to begin the process.  The patient will be returned to the waiting area for the time being and possibly be re-assessed by a subsequent ED provider.  The patient will be brought back to the treatment area in as timely manner as possible.         Reji Quintanilla PA-C  02/08/22 0119

## 2022-02-09 NOTE — OP NOTE
LAPAROSCOPIC EXPLORATION FOR ECTOPIC PREGNANCY  Procedure Note    Yvonne Barnett  2/9/2022    Pre-op Diagnosis:   Tubal pregnancy without intrauterine pregnancy, unspecified laterality [O00.109]    Post-op Diagnosis:     Post-Op Diagnosis Codes:     * Tubal pregnancy without intrauterine pregnancy, unspecified laterality [O00.109]    Procedure(s):  LAPAROSCOPIC EXPLORATION FOR ECTOPIC PREGNANCY; right salpingectomy    Surgeon(s):  Wei Marrufo DO    Anesthesia: General    Estimated Blood Loss: minimal    Specimens:  Order Name Source Comment Collection Info Order Time   PREGNANCY, URINE    2/9/2022  8:52 AM     Release to patient   Immediate        TISSUE PATHOLOGY EXAM Fallopian Tube, Right  Collected By: Wei Marrufo DO 2/9/2022  9:58 AM     Release to patient   Immediate              Procedure:  The patient was taken to the operating room, given general anesthesia, prepped and draped in the usual sterile fashion.  The bladder was drained with a straight catheter.  A speculum was placed into the vagina and a uterine manipulator was placed.  The surgeon was regloved and attention made to the abdomen.      A one centimeter incision was made into the umbilicus and an OPTIVIEW trocar was placed into the abdomen under direct visualization using the laparoscope.  The abdomen was filled with CO2 gas up to 15mm mercury pressure.  A second eight millimeter trocar was placed suprapubically to the right of the midline.  A third 5 mm trocar was placed to the left of the midline.      When we entered the abdomen there was hemoperitoneum noted.  The right fallopian tube was swollen with ectopic pregnancy.  The fimbriated end appeared to be blunted and somewhat irregular looking so the decision was made to remove the right fallopian tube.    We picked up the right fallopian tube and cauterized under using the Enseal device to the point where we got to the cornua flush with the uterus and  cauterized across it with the Enseal. The rest of the abdomen and pelvis was grossly normal.     We removed the CO2 gas and trocars.  The skin incisions were closed with a 4-0 monocryl and surgical adhesive.      Sponge, lap and needle counts were correct.       Findings: Hemoperitoneum, right tubal pregnancy    Complications: none    Grafts or Implants: NA    Wei Marrufo,      Date: 2/9/2022  Time: 10:08 EST

## 2022-02-14 LAB
LAB AP CASE REPORT: NORMAL
PATH REPORT.FINAL DX SPEC: NORMAL

## 2022-02-15 NOTE — DISCHARGE SUMMARY
Date of Discharge: 02/15/22     Discharge Diagnosis:   Ruptured right tubal pregnancy  Hemoperitoneum  Pelvic pain     Problem List:    Ectopic pregnancy without intrauterine pregnancy    Ectopic pregnancy      Presenting Problem/History of Present Illness  Ectopic pregnancy, unspecified location, unspecified whether intrauterine pregnancy present [O00.90]      Hospital Course  Patient is a 26 y.o. female presented with Severe pelvic pain.  She had a known ectopic pregnancy and had previously been treated with methotrexate.  Ultrasound showed a large amount of free fluid.  She was taken to the operating room and a right salpingectomy was performed.  She was discharged home postoperatively in stable condition.   Procedures Performed  Procedure(s):  LAPAROSCOPIC EXPLORATION FOR ECTOPIC PREGNANCY; right salpingectomy       Consults:   Consults     No orders found from 1/10/2022 to 2/9/2022.          Pertinent Test Results:    Condition on Discharge: Stable  Vital Signs       Discharge Disposition  Home or Self Care    Discharge Medications     Discharge Medications      New Medications      Instructions Start Date   HYDROcodone-acetaminophen 5-325 MG per tablet  Commonly known as: NORCO   1 tablet, Oral, Every 4 Hours PRN      ibuprofen 600 MG tablet  Commonly known as: ADVIL,MOTRIN   Take 1 tablet by mouth Every 6 (Six) Hours As Needed for Mild Pain.      nitrofurantoin (macrocrystal-monohydrate) 100 MG capsule  Commonly known as: Macrobid   100 mg, Oral, 2 Times Daily             Discharge Diet   Regular    Activity at Discharge  No heavy lifting    Follow-up Appointments  No future appointments.        Time: Discharge 15 min

## 2022-12-21 LAB
EXTERNAL CHLAMYDIA SCREEN: NORMAL
EXTERNAL GONORRHEA SCREEN: NORMAL

## 2023-01-19 LAB
EXTERNAL HEPATITIS B SURFACE ANTIGEN: NEGATIVE
EXTERNAL RUBELLA QUALITATIVE: NORMAL
EXTERNAL SYPHILIS RPR SCREEN: NORMAL
HIV1 P24 AG SERPL QL IA: NORMAL

## 2023-04-28 LAB — EXTERNAL GTT 1 HOUR: 135

## 2023-06-09 ENCOUNTER — APPOINTMENT (OUTPATIENT)
Dept: ULTRASOUND IMAGING | Facility: HOSPITAL | Age: 28
End: 2023-06-09
Payer: COMMERCIAL

## 2023-06-09 ENCOUNTER — HOSPITAL ENCOUNTER (OUTPATIENT)
Facility: HOSPITAL | Age: 28
Discharge: HOME | End: 2023-06-09
Attending: OBSTETRICS & GYNECOLOGY | Admitting: OBSTETRICS & GYNECOLOGY
Payer: COMMERCIAL

## 2023-06-09 VITALS — TEMPERATURE: 97.8 F

## 2023-06-09 PROBLEM — O42.90 AMNIOTIC FLUID LEAKING: Status: ACTIVE | Noted: 2023-06-09

## 2023-06-09 LAB
A1 MICROGLOB PLACENTAL VAG QL: NEGATIVE
BILIRUB UR QL STRIP: NEGATIVE
CLARITY UR: CLEAR
COLOR UR: YELLOW
GLUCOSE UR STRIP-MCNC: ABNORMAL MG/DL
HGB UR QL STRIP.AUTO: NEGATIVE
KETONES UR QL STRIP: NEGATIVE
LEUKOCYTE ESTERASE UR QL STRIP.AUTO: NEGATIVE
NITRITE UR QL STRIP: NEGATIVE
PH UR STRIP.AUTO: 7 [PH] (ref 5–8)
PROT UR QL STRIP: NEGATIVE
SP GR UR STRIP: 1.02 (ref 1–1.03)
UROBILINOGEN UR QL STRIP: ABNORMAL

## 2023-06-09 PROCEDURE — G0463 HOSPITAL OUTPT CLINIC VISIT: HCPCS

## 2023-06-09 PROCEDURE — 81003 URINALYSIS AUTO W/O SCOPE: CPT | Performed by: OBSTETRICS & GYNECOLOGY

## 2023-06-09 PROCEDURE — 25010000002 TERBUTALINE PER 1 MG

## 2023-06-09 PROCEDURE — 87086 URINE CULTURE/COLONY COUNT: CPT | Performed by: OBSTETRICS & GYNECOLOGY

## 2023-06-09 PROCEDURE — 96372 THER/PROPH/DIAG INJ SC/IM: CPT

## 2023-06-09 PROCEDURE — 76819 FETAL BIOPHYS PROFIL W/O NST: CPT

## 2023-06-09 PROCEDURE — 84112 EVAL AMNIOTIC FLUID PROTEIN: CPT | Performed by: OBSTETRICS & GYNECOLOGY

## 2023-06-09 PROCEDURE — 59025 FETAL NON-STRESS TEST: CPT

## 2023-06-09 RX ORDER — SODIUM CHLORIDE, SODIUM LACTATE, POTASSIUM CHLORIDE, CALCIUM CHLORIDE 600; 310; 30; 20 MG/100ML; MG/100ML; MG/100ML; MG/100ML
150 INJECTION, SOLUTION INTRAVENOUS CONTINUOUS
Status: DISCONTINUED | OUTPATIENT
Start: 2023-06-09 | End: 2023-06-09 | Stop reason: HOSPADM

## 2023-06-09 RX ORDER — TERBUTALINE SULFATE 1 MG/ML
0.25 INJECTION, SOLUTION SUBCUTANEOUS ONCE
Status: COMPLETED | OUTPATIENT
Start: 2023-06-09 | End: 2023-06-09

## 2023-06-09 RX ORDER — SODIUM CHLORIDE 0.9 % (FLUSH) 0.9 %
10 SYRINGE (ML) INJECTION EVERY 12 HOURS SCHEDULED
Status: DISCONTINUED | OUTPATIENT
Start: 2023-06-09 | End: 2023-06-09 | Stop reason: HOSPADM

## 2023-06-09 RX ORDER — TERBUTALINE SULFATE 1 MG/ML
INJECTION, SOLUTION SUBCUTANEOUS
Status: COMPLETED
Start: 2023-06-09 | End: 2023-06-09

## 2023-06-09 RX ORDER — SODIUM CHLORIDE 0.9 % (FLUSH) 0.9 %
10 SYRINGE (ML) INJECTION AS NEEDED
Status: DISCONTINUED | OUTPATIENT
Start: 2023-06-09 | End: 2023-06-09 | Stop reason: HOSPADM

## 2023-06-09 RX ADMIN — TERBUTALINE SULFATE 0.25 MG: 1 INJECTION, SOLUTION SUBCUTANEOUS at 15:54

## 2023-06-09 RX ADMIN — SODIUM CHLORIDE, POTASSIUM CHLORIDE, SODIUM LACTATE AND CALCIUM CHLORIDE 1000 ML: 600; 310; 30; 20 INJECTION, SOLUTION INTRAVENOUS at 14:15

## 2023-06-09 NOTE — NON STRESS TEST
Yvonne Barnett, a  at 33w3d with an AAKASH of 2023, by Ultrasound, was [facility] for a nonstress test.    Chief Complaint   Patient presents with   • Abdominal Cramping       Patient Active Problem List   Diagnosis   • Decreased fetal movement in pregnancy   • Normal labor   • Term pregnancy delivered   • Pregnancy   •  labor   • UTI (urinary tract infection)   •  contractions   • Pregnant   • Ectopic pregnancy without intrauterine pregnancy   • Ectopic pregnancy   • Amniotic fluid leaking       Start Time: 1315  Stop Time: 1400

## 2023-06-10 LAB — BACTERIA SPEC AEROBE CULT: NO GROWTH

## 2023-06-15 ENCOUNTER — HOSPITAL ENCOUNTER (OUTPATIENT)
Facility: HOSPITAL | Age: 28
Discharge: HOME | End: 2023-06-15
Attending: OBSTETRICS & GYNECOLOGY | Admitting: OBSTETRICS & GYNECOLOGY
Payer: COMMERCIAL

## 2023-06-15 ENCOUNTER — APPOINTMENT (OUTPATIENT)
Dept: ULTRASOUND IMAGING | Facility: HOSPITAL | Age: 28
End: 2023-06-15
Payer: COMMERCIAL

## 2023-06-15 VITALS
WEIGHT: 156.1 LBS | RESPIRATION RATE: 18 BRPM | DIASTOLIC BLOOD PRESSURE: 67 MMHG | HEART RATE: 107 BPM | BODY MASS INDEX: 28.73 KG/M2 | HEIGHT: 62 IN | TEMPERATURE: 98 F | SYSTOLIC BLOOD PRESSURE: 119 MMHG

## 2023-06-15 PROBLEM — O36.8190 DECREASED FETAL MOVEMENT: Status: ACTIVE | Noted: 2023-06-15

## 2023-06-15 LAB
A1 MICROGLOB PLACENTAL VAG QL: NEGATIVE
BILIRUB UR QL STRIP: NEGATIVE
CLARITY UR: CLEAR
COLOR UR: YELLOW
GLUCOSE UR STRIP-MCNC: ABNORMAL MG/DL
HGB UR QL STRIP.AUTO: NEGATIVE
KETONES UR QL STRIP: NEGATIVE
LEUKOCYTE ESTERASE UR QL STRIP.AUTO: NEGATIVE
NITRITE UR QL STRIP: NEGATIVE
PH UR STRIP.AUTO: 7 [PH] (ref 5–8)
PROT UR QL STRIP: NEGATIVE
SP GR UR STRIP: 1.01 (ref 1–1.03)
UROBILINOGEN UR QL STRIP: ABNORMAL

## 2023-06-15 PROCEDURE — 84112 EVAL AMNIOTIC FLUID PROTEIN: CPT | Performed by: OBSTETRICS & GYNECOLOGY

## 2023-06-15 PROCEDURE — G0463 HOSPITAL OUTPT CLINIC VISIT: HCPCS

## 2023-06-15 PROCEDURE — 81003 URINALYSIS AUTO W/O SCOPE: CPT | Performed by: OBSTETRICS & GYNECOLOGY

## 2023-06-15 PROCEDURE — 59025 FETAL NON-STRESS TEST: CPT

## 2023-06-15 PROCEDURE — 87086 URINE CULTURE/COLONY COUNT: CPT | Performed by: OBSTETRICS & GYNECOLOGY

## 2023-06-15 PROCEDURE — 76819 FETAL BIOPHYS PROFIL W/O NST: CPT

## 2023-06-15 RX ORDER — PRENATAL VIT NO.126/IRON/FOLIC 28MG-0.8MG
1 TABLET ORAL DAILY
COMMUNITY

## 2023-06-15 RX ORDER — ACYCLOVIR 400 MG/1
400 TABLET ORAL 3 TIMES DAILY
COMMUNITY

## 2023-06-15 NOTE — NON STRESS TEST
Yvonne Barnett, a  at 34w2d with an AAKASH of 2023, by Ultrasound, was seen at [facility] for a nonstress test.    Chief Complaint   Patient presents with    Decreased Fetal Movement       Patient Active Problem List   Diagnosis    Decreased fetal movement in pregnancy    Normal labor    Term pregnancy delivered    Pregnancy     labor    UTI (urinary tract infection)     contractions    Pregnant    Ectopic pregnancy without intrauterine pregnancy    Ectopic pregnancy    Amniotic fluid leaking    Decreased fetal movement       Start Time: 1712  Stop Time: 1732    Interpretation A  Nonstress Test Interpretation A: Reactive  Comments A: confirmed with Blanca REYNOSO RN

## 2023-06-16 NOTE — NON STRESS TEST
Yvonne Barnett, a  at 34w2d with an AAKASH of 2023, by Ultrasound, was seen at [facility] for a nonstress test.    Chief Complaint   Patient presents with    Decreased Fetal Movement       Patient Active Problem List   Diagnosis    Decreased fetal movement in pregnancy    Normal labor    Term pregnancy delivered    Pregnancy     labor    UTI (urinary tract infection)     contractions    Pregnant    Ectopic pregnancy without intrauterine pregnancy    Ectopic pregnancy    Amniotic fluid leaking    Decreased fetal movement       Start Time:   Stop Time:       Interpretation A  Nonstress Test Interpretation A: Reactive  Comments A: VERIFIED BY FLORESITA PHAM RN

## 2023-06-16 NOTE — NURSING NOTE
PATIENT PRESENTS TO L&D COMPLAINING OF DECREASED FETAL MOVEMENT. NST REACTIVE. AMNISURE NEGATIVE. BPP 8/8. PATIENT STATES BABY IS MOVING BETTER. WANTS TO GO HOME.     PER MD DISCHARGE HOME WITH WARNING SIGNS.    APPOINTMENT TOMORROW IN OFFICE.

## 2023-06-17 LAB — BACTERIA SPEC AEROBE CULT: NO GROWTH

## (undated) DEVICE — GLV SURG PREMIERPRO MIC LTX PF SZ8 BRN

## (undated) DEVICE — ENDOPATH ELECTROSURGERY PROBE PLUS II 5MM RIGHT ANGLE MONOPOLAR ELECTROSURGERY SUCTION AND IRRRIGATION SHAFTS WITH RIGHT ANGLE ELECTRODE - USE ONLY WITH PROBE PLUS II HANDLES: Brand: ENDOPATH

## (undated) DEVICE — 2, DISPOSABLE SUCTION/IRRIGATOR WITH DISPOSABLE TIP: Brand: STRYKEFLOW

## (undated) DEVICE — ST TBG PNEUMOCLEAR EVAC SMOKE HIFLO

## (undated) DEVICE — TUBING, SUCTION, 1/4" X 20', STRAIGHT: Brand: MEDLINE INDUSTRIES, INC.

## (undated) DEVICE — GOWN,REINF,POLY,ECL,PP SLV,XXL: Brand: MEDLINE

## (undated) DEVICE — SUT MNCRYL 4/0 PS2 18 IN

## (undated) DEVICE — ENDOPATH XCEL UNIVERSAL TROCAR STABLILITY SLEEVES: Brand: ENDOPATH XCEL

## (undated) DEVICE — ENDOPOUCH RETRIEVER SPECIMEN RETRIEVAL BAGS: Brand: ENDOPOUCH RETRIEVER

## (undated) DEVICE — PATIENT RETURN ELECTRODE, SINGLE-USE, CONTACT QUALITY MONITORING, ADULT, WITH 9FT CORD, FOR PATIENTS WEIGING OVER 33LBS. (15KG): Brand: MEGADYNE

## (undated) DEVICE — 3M™ STERI-STRIP™ REINFORCED ADHESIVE SKIN CLOSURES, R1547, 1/2 IN X 4 IN (12 MM X 100 MM), 6 STRIPS/ENVELOPE: Brand: 3M™ STERI-STRIP™

## (undated) DEVICE — ENDOPATH XCEL BLADELESS TROCARS WITH STABILITY SLEEVES: Brand: ENDOPATH XCEL

## (undated) DEVICE — APPL CHLORAPREP HI/LITE 26ML ORNG

## (undated) DEVICE — ENDOPATH ELECTROSURGERY PROBE PLUS II PISTOL HAND CONTROL PISTOL GRIP HANDLES WITH SUCTION AND IRRRIGATION FOR HAND CONTROL MONOPOLAR ELCTROSURGERY USE ONLY WITH PROBE PLUS II SHAFTS: Brand: ENDOPATH

## (undated) DEVICE — COR GYN LAPAROSCOPY: Brand: MEDLINE INDUSTRIES, INC.

## (undated) DEVICE — DRSNG WND BORDR/ADHS NONADHR/GZ LF 2X2IN STRL

## (undated) DEVICE — ENSEAL X1 TISSUE SEALER, CURVED JAW, 37 CM SHAFT LENGTH: Brand: ENSEAL